# Patient Record
Sex: MALE | Race: WHITE | NOT HISPANIC OR LATINO | Employment: OTHER | URBAN - METROPOLITAN AREA
[De-identification: names, ages, dates, MRNs, and addresses within clinical notes are randomized per-mention and may not be internally consistent; named-entity substitution may affect disease eponyms.]

---

## 2017-08-22 ENCOUNTER — DOCUMENTATION ONLY (OUTPATIENT)
Dept: OTHER | Facility: CLINIC | Age: 77
End: 2017-08-22

## 2017-08-22 PROBLEM — Z71.89 ADVANCED DIRECTIVES, COUNSELING/DISCUSSION: Chronic | Status: ACTIVE | Noted: 2017-08-22

## 2018-06-08 ENCOUNTER — TRANSFERRED RECORDS (OUTPATIENT)
Dept: HEALTH INFORMATION MANAGEMENT | Facility: CLINIC | Age: 78
End: 2018-06-08

## 2018-06-12 ENCOUNTER — TRANSFERRED RECORDS (OUTPATIENT)
Dept: HEALTH INFORMATION MANAGEMENT | Facility: CLINIC | Age: 78
End: 2018-06-12

## 2018-06-12 LAB
CHOLEST SERPL-MCNC: 126 MG/DL (ref 114–200)
CREAT SERPL-MCNC: 1.03 MG/DL (ref 0.7–1.2)
GFR SERPL CREATININE-BSD FRML MDRD: >60 ML/MIN/1.73M2
GLUCOSE SERPL-MCNC: 114 MG/DL (ref 70–100)
HDLC SERPL-MCNC: 35 MG/DL (ref 40–60)
LDLC SERPL CALC-MCNC: 53 MG/DL
POTASSIUM SERPL-SCNC: 4.3 MEQ/L (ref 3.4–5.1)
TRIGL SERPL-MCNC: 201 MG/DL (ref 10–200)

## 2018-08-08 ENCOUNTER — TRANSFERRED RECORDS (OUTPATIENT)
Dept: HEALTH INFORMATION MANAGEMENT | Facility: CLINIC | Age: 78
End: 2018-08-08

## 2018-08-15 ENCOUNTER — TRANSFERRED RECORDS (OUTPATIENT)
Dept: HEALTH INFORMATION MANAGEMENT | Facility: CLINIC | Age: 78
End: 2018-08-15

## 2018-08-15 LAB
CREAT SERPL-MCNC: 1.07 MG/DL (ref 0.7–1.2)
GFR SERPL CREATININE-BSD FRML MDRD: >60 ML/MIN/1.73M2
POTASSIUM SERPL-SCNC: 4.4 MEQ/L (ref 3.4–5.1)

## 2018-08-20 ENCOUNTER — TRANSFERRED RECORDS (OUTPATIENT)
Dept: HEALTH INFORMATION MANAGEMENT | Facility: CLINIC | Age: 78
End: 2018-08-20

## 2018-08-20 LAB
ABSTRACT IFOB-NO CHARGE: NEGATIVE
ABSTRACT IFOB-NO CHARGE: NEGATIVE

## 2018-08-27 ENCOUNTER — TRANSFERRED RECORDS (OUTPATIENT)
Dept: HEALTH INFORMATION MANAGEMENT | Facility: CLINIC | Age: 78
End: 2018-08-27

## 2018-10-08 ENCOUNTER — TRANSFERRED RECORDS (OUTPATIENT)
Dept: HEALTH INFORMATION MANAGEMENT | Facility: CLINIC | Age: 78
End: 2018-10-08

## 2018-10-18 ENCOUNTER — TRANSFERRED RECORDS (OUTPATIENT)
Dept: HEALTH INFORMATION MANAGEMENT | Facility: CLINIC | Age: 78
End: 2018-10-18

## 2019-04-18 ENCOUNTER — TRANSFERRED RECORDS (OUTPATIENT)
Dept: HEALTH INFORMATION MANAGEMENT | Facility: CLINIC | Age: 79
End: 2019-04-18

## 2019-05-20 ENCOUNTER — TRANSFERRED RECORDS (OUTPATIENT)
Dept: HEALTH INFORMATION MANAGEMENT | Facility: CLINIC | Age: 79
End: 2019-05-20

## 2019-05-28 ENCOUNTER — TRANSFERRED RECORDS (OUTPATIENT)
Dept: HEALTH INFORMATION MANAGEMENT | Facility: CLINIC | Age: 79
End: 2019-05-28

## 2019-07-19 NOTE — PROGRESS NOTES
Subjective     Zoran Granado is a 79 year old male who presents to clinic today for the following health issues:    HPI   New Patient/Transfer of Care    Hyperlipidemia Follow-Up      Are you having any of the following symptoms? (Select all that apply)  Increased sweating or nausea with activity and Pain in calves when walking 1-2 blocks    Are you regularly taking any medication or supplement to lower your cholesterol?   Yes- Lipitor    Are you having muscle aches or other side effects that you think could be caused by your cholesterol lowering medication?  Yes- unsure if it is due to medication.         Amount of exercise or physical activity: 2-3 days/week for an average of 15-30 minutes    Problems taking medications regularly: No    Medication side effects: none    Diet: regular (no restrictions)     Chronic Pain Follow-Up       Type / Location of Pain: Left shoulder, neck, lower back   Analgesia/pain control:       Recent changes:  Same - stable       Overall control: Tolerable with discomfort  Activity level/function:      Daily activities:  Able to do moderate activities    Work:  not applicable - retired   Adverse effects:  No  Adherance    Taking medication as directed?  Yes    Participating in other treatments: not applicable  Risk Factors:    Sleep:  Fair    Mood/anxiety:  controlled    Recent family or social stressors:  none noted    Other aggravating factors: prolonged sitting and prolonged standing, walking     Pain diagnosis - DJD of lumbar spine, spondylolisthesis, DJD of cervical spine, sciatica, thoracic back pain.    Prior injections, rhizotomy, pain program completion in [facility].  Prior trigger point injections with PMR. Botox not covered.  Prior chiropractic.  Prior back surgery, shoulder surgery.  Prior Celebrex, Neurontin wasn't effective,  Elavil with side effects, as well as Effexor and Wellbutrin.  No history of mental health diagnoses.    Currently regimen of Lyrica and Cymbalta  working well with Lortab 3 times per day on average, occasionally 4.    Regimen allows him to do yard work, cut grass, garden, household projects.    Former smoker - quit .    No flowsheet data found.  No flowsheet data found.  Encounter-Level CSA:    There are no encounter-level csa.     Patient-Level CSA:    There are no patient-level csa.       (left shoulder surgery one year ago)        Current Outpatient Medications   Medication     Acetaminophen 325 MG CAPS     aspirin 81 MG tablet     atorvastatin (LIPITOR) 80 MG tablet     calcium carbonate-vitamin D 600-200 MG-UNIT CAPS     diphenhydrAMINE-acetaminophen (TYLENOL PM EXTRA STRENGTH)  MG tablet     DULoxetine (CYMBALTA) 60 MG capsule     HYDROcodone-acetaminophen (LORTAB 10)  MG per tablet     Multiple Vitamins-Minerals (PRESERVISION AREDS 2 PO)     polyethylene glycol (MIRALAX) powder     pregabalin (LYRICA) 150 MG capsule     No current facility-administered medications for this visit.        Patient Active Problem List   Diagnosis     Multiple lipomas     Advance Care Planning     Past Surgical History:   Procedure Laterality Date     APPENDECTOMY       ARTHROSCOPY KNEE BILATERAL       BACK SURGERY      lumbar L5/S1     CATARACT IOL, RT/LT Bilateral      COLONOSCOPY       disc replacement      lumbar; single level L5/S1; Dr Fauzia Renner [facility] ECP WITH CATARACT SURGERY      both eyes     NISSEN FUNDOPLICATION       SHOULDER SURGERY      Bilateral; twice left, once right; arthroscopies     TONSILLECTOMY         Social History     Tobacco Use     Smoking status: Former Smoker     Types: Cigarettes     Last attempt to quit: 1992     Years since quittin.5     Smokeless tobacco: Never Used   Substance Use Topics     Alcohol use: Never     Frequency: Never     Family History   Problem Relation Age of Onset     Diabetes Mother      Heart Disease Mother      Heart Disease Father      Lipids Sister         5 sisters  "    Lipids Brother         2 brothers     Diabetes Brother      Hypertension Brother      Breast Cancer Sister         breast             Reviewed and updated as needed this visit by Provider  Tobacco  Allergies  Meds  Problems  Med Hx  Surg Hx  Fam Hx  Soc Hx          Review of Systems   ROS COMP: Constitutional, HEENT, cardiovascular, pulmonary, gi and gu systems are negative, except as otherwise noted.      Objective    /70 (BP Location: Right arm, Patient Position: Chair, Cuff Size: Adult Large)   Pulse 78   Temp 97.8  F (36.6  C) (Tympanic)   Ht 1.772 m (5' 9.75\")   Wt 111 kg (244 lb 12.8 oz)   SpO2 93%   BMI 35.38 kg/m    Body mass index is 35.38 kg/m .  Physical Exam   GENERAL: healthy, alert and no distress  NECK: no adenopathy, no asymmetry, masses, or scars and thyroid normal to palpation  RESP: lungs clear to auscultation - no rales, rhonchi or wheezes  CV: regular rate and rhythm, normal S1 S2, no S3 or S4, no murmur, click or rub, no peripheral edema and peripheral pulses strong  ABDOMEN: soft, nontender, no hepatosplenomegaly, no masses and bowel sounds normal  MS: normal muscle tone, peripheral pulses normal and lumbar surgical scar well healed with minimal tenderness; SLR negative bilaterally; normal gait; reflexes symmetric  SKIN: no suspicious lesions or rashes  NEURO: Normal strength and tone, mentation intact and speech normal  PSYCH: mentation appears normal, affect normal/bright    Diagnostic Test Results:  Care Everywhere records reviewed.        Assessment & Plan       ICD-10-CM    1. Encounter for medical examination to establish care Z00.00    2. Chronic pain syndrome G89.4 Pain Drug Scr UR W Rptd Meds   3. Hyperlipidemia, unspecified hyperlipidemia type E78.5         BMI:   Estimated body mass index is 35.38 kg/m  as calculated from the following:    Height as of this encounter: 1.772 m (5' 9.75\").    Weight as of this encounter: 111 kg (244 lb 12.8 oz).   Weight " management plan: Discussed healthy diet and exercise guidelines    Will call when due for next refill - not due yet today.    Patient Instructions   Contract signed today.  Lab screen today.  Office visits every 3 months.          No follow-ups on file.    Evelyn Iniguez MD  [facility] [place]

## 2019-07-25 ENCOUNTER — DOCUMENTATION ONLY (OUTPATIENT)
Dept: OTHER | Facility: CLINIC | Age: 79
End: 2019-07-25

## 2019-07-25 ENCOUNTER — OFFICE VISIT (OUTPATIENT)
Dept: FAMILY MEDICINE | Facility: OTHER | Age: 79
End: 2019-07-25
Attending: FAMILY MEDICINE
Payer: COMMERCIAL

## 2019-07-25 VITALS
SYSTOLIC BLOOD PRESSURE: 140 MMHG | TEMPERATURE: 97.8 F | HEART RATE: 78 BPM | BODY MASS INDEX: 35.05 KG/M2 | OXYGEN SATURATION: 93 % | WEIGHT: 244.8 LBS | HEIGHT: 70 IN | DIASTOLIC BLOOD PRESSURE: 70 MMHG

## 2019-07-25 DIAGNOSIS — G89.4 CHRONIC PAIN SYNDROME: ICD-10-CM

## 2019-07-25 DIAGNOSIS — Z00.00 ENCOUNTER FOR MEDICAL EXAMINATION TO ESTABLISH CARE: Primary | ICD-10-CM

## 2019-07-25 DIAGNOSIS — E78.5 HYPERLIPIDEMIA, UNSPECIFIED HYPERLIPIDEMIA TYPE: ICD-10-CM

## 2019-07-25 PROCEDURE — 80307 DRUG TEST PRSMV CHEM ANLYZR: CPT | Mod: ZL | Performed by: FAMILY MEDICINE

## 2019-07-25 PROCEDURE — 99000 SPECIMEN HANDLING OFFICE-LAB: CPT | Performed by: FAMILY MEDICINE

## 2019-07-25 PROCEDURE — G0463 HOSPITAL OUTPT CLINIC VISIT: HCPCS

## 2019-07-25 PROCEDURE — 99203 OFFICE O/P NEW LOW 30 MIN: CPT | Performed by: FAMILY MEDICINE

## 2019-07-25 RX ORDER — ATORVASTATIN CALCIUM 80 MG/1
80 TABLET, FILM COATED ORAL DAILY
COMMUNITY
End: 2019-12-23

## 2019-07-25 SDOH — HEALTH STABILITY: MENTAL HEALTH: HOW OFTEN DO YOU HAVE A DRINK CONTAINING ALCOHOL?: NEVER

## 2019-07-25 ASSESSMENT — ANXIETY QUESTIONNAIRES
4. TROUBLE RELAXING: NOT AT ALL
GAD7 TOTAL SCORE: 0
3. WORRYING TOO MUCH ABOUT DIFFERENT THINGS: NOT AT ALL
5. BEING SO RESTLESS THAT IT IS HARD TO SIT STILL: NOT AT ALL
2. NOT BEING ABLE TO STOP OR CONTROL WORRYING: NOT AT ALL
7. FEELING AFRAID AS IF SOMETHING AWFUL MIGHT HAPPEN: NOT AT ALL
6. BECOMING EASILY ANNOYED OR IRRITABLE: NOT AT ALL
1. FEELING NERVOUS, ANXIOUS, OR ON EDGE: NOT AT ALL

## 2019-07-25 ASSESSMENT — PATIENT HEALTH QUESTIONNAIRE - PHQ9: SUM OF ALL RESPONSES TO PHQ QUESTIONS 1-9: 1

## 2019-07-25 ASSESSMENT — PAIN SCALES - GENERAL: PAINLEVEL: MODERATE PAIN (5)

## 2019-07-25 ASSESSMENT — MIFFLIN-ST. JEOR: SCORE: 1837.69

## 2019-07-25 NOTE — LETTER
[facility]  07/25/19    Patient: Zoran Granado  YOB: 1940  Medical Record Number: [id number]                                                                  Opioid / Opioid Plus Controlled Substance Agreement    I understand that my care provider has prescribed an opioid (narcotic) controlled substance to help manage my condition(s). I am taking this medicine to help me function or work. I know this is strong medicine, and that it can cause serious side effects. Opioid medicine can be sedating, addicting and may cause a dependency on the drug. They can affect my ability to drive or think, and cause depression. They need to be taken exactly as prescribed. Combining opioids with certain medicines or chemicals (such as cocaine, sedatives and tranquilizers, sleeping pills, meth) can be dangerous or even fatal. Also, if I stop opioids suddenly, I may have severe withdrawal symptoms. Last, I understand that opioids do not work for all types of pain nor for all patients. If not helpful, I may be asked to stop them.        The risks, benefits, and side effects of these medicine(s) were explained to me. I agree that:    1. I will take part in other treatments as advised by my care team. This may be psychiatry or counseling, physical therapy, behavioral therapy, group treatment or a referral to a pain clinic. I will reduce or stop my medicine when my care team tells me to do so.  2. I will take my medicines as prescribed. I will not change the dose or schedule unless my care team tells me to. There will be no refills if I  run out early.   I may be contactedwithout warning and asked to complete a urine drug test or pill count at any time.   3. I will keep all my appointments, and understand this is part of the monitoring of opioids. My care team may require an office visit for EVERY opioid/controlled substance refill. If I miss appointments or don t follow instructions, my care team may stop my  medicine.  4. I will not ask other providers to prescribe controlled substances, and I will not accept controlled substances from other people. If I need another prescribed controlled substance for a new reason, I will tell my care team within 1 business day.  5. I will use one pharmacy to fill all of my controlled substance prescriptions, and it is up to me to make sure that I do not run out of my medicines on weekends or holidays. If my care team is willing to refill my opioid prescription without a visit, I must request refills only during office hours, refills may take up to 3 days to process, and it may take up to 5 to 7 days for my medicine to be mailed and ready at my pharmacy. Prescriptions will not be mailed anywhere except my pharmacy.        [id number]  Rev 12/18         Registration to scan to EHR                             Page 1 of 2               Controlled Substance Agreement Opioid        [facility] [place] [facility]  07/25/19  Patient: Zoran Granado  YOB: 1940  Medical Record Number: [id number]                                                                  6. I am responsible for my prescriptions. If the medicine/prescription is lost or stolen, it will not be replaced. I also agree not to share controlled substance medicines with anyone.  7. I agree to not use ANY illegal or recreational drugs. This includes marijuana, cocaine, bath salts or other drugs. I agree not to use alcohol unless my care team says I may.          I agree to give urine samples whenever asked. If I don t give a urine sample, the care team may stop my medicine.    8. If I enroll in the [place] Medical Marijuana program, I will tell my care team. I will also sign an agreement to share my medical records with my care team.   9. I will bring in my list of medicines (or my medicine bottles) each time I come to the clinic.   10. I will tell my care team right away if I become pregnant or have a new medical problem treated  outside of my regular clinic.  11. I understand that this medicine can affect my thinking and judgment. It may be unsafe for me to drive, use machinery and do dangerous tasks. I will not do any of these things until I know how the medicine affects me. If my dose changes, I will wait to see how it affects me. I will contact my care team if I have concerns about medicine side effects.    I understand that if I do not follow any of the conditions above, my prescriptions or treatment may be stopped.      I agree that my provider, clinic care team, and pharmacy may work with any city, state or federal law enforcement agency that investigates the misuse, sale, or other diversion of my controlled medicine. I will allow my provider to discuss my care with or share a copy of this agreement with any other treating provider, pharmacy or emergency room where I receive care. I agree to give up (waive) any right of privacy or confidentiality with respect to these consents.     I have read this agreement and have asked questions about anything I did not understand.      ________________________________________________________________________  Patient signature - Date/Time -  Zoran Granado                                      ________________________________________________________________________  Witness signature                                                            ________________________________________________________________________  Provider signature - Evelyn Iniguez MD      [id number]  Rev 12/18         Registration to scan to EHR                         Page 2 of 2                   Controlled Substance Agreement Opioid           Page 1 of 2  Opioid Pain Medicines (also known as Narcotics)  What You Need to Know    What are opioids?   Opioids are pain medicines that must be prescribed by a doctor.  They are also known as narcotics.    Examples are:     morphine (MS Contin, Kristina)    oxycodone  (Oxycontin)    oxycodone and acetaminophen (Percocet)    hydrocodone and acetaminophen (Vicodin, Norco)     fentanyl patch (Duragesic)     hydromorphone (Dilaudid)     methadone     What do opioids do well?   Opioids are best for short-term pain after a surgery or injury. They also work well for cancer pain. Unlike other pain medicines, they do not cause liver or kidney failure or ulcers. They may help some people with long-lasting (chronic) pain.     What do opioids NOT do well?   Opioids never get rid of pain entirely, and they do not work well for most patients with chronic pain. Opioids do not reduce swelling, one of the causes of pain. They also don t work well for nerve pain.                           For informational purposes only.  Not to replace the advice of your care provider.  Copyright [address] [facility]. All right reserved. [employer] 968780-Mcx 02/18.      Page 2 of 2    Risks and side effects   Talk to your doctor before you start or decide to keep taking one of these medicines. Side effects include:    Lowering your breathing rate enough to cause death    Overdose, including death, especially if taking higher than prescribed doses    Long-term opioid use    Worse depression symptoms; less pleasure in things you usually enjoy    Feeling tired or sluggish    Slower thoughts or cloudy thinking    Being more sensitive to pain over time; pain is harder to control    Trouble sleeping or restless sleep    Changes in hormone levels (for example, less testosterone)    Changes in sex drive or ability to have sex    Constipation    Unsafe driving    Itching and sweating    Feeling dizzy    Nausea, vomiting and dry mouth    What else should I know about opioids?  When someone takes opioids for too long or too often, they become dependent. This means that if you stop or reduce the medicine too quickly, you will have withdrawal symptoms.    Dependence is not the same as addiction. Addiction is when  people keep using a substance that harms their body, their mind or their relations with others. If you have a history of drug or alcohol abuse, taking opioids can cause a relapse.    Over time, opioids don t work as well. Most people will need higher and higher doses. The higher the dose, the more serious the side effects. We don t know the long-term effects of opioids.      Prescribed opioids aren't the best way to manage chronic pain    Other ways to manage pain include:      Ibuprofen or acetaminophen.  You should always try this first.      Treat health problems that may be causing pain.      acupuncture or massage, deep breathing, meditation, visual imagery, aromatherapy.      Use heat or ice at the pain site      Physical therapy and exercise      Stop smoking      See a counselor or therapist                                                  People who have used opioids for a long time may have a lower quality of life, worse depression, higher levels of pain and more visits to doctors.    Never share your opioids with others. Be sure to store opioids in a secure place, locked if possible.Young children can easily swallow them and overdose.     You can overdose on opioids.  Signs of overdose include decrease or loss of consciousness, slowed breathing, trouble waking and blue lips.  If someone is worried about overdose, they should call 911.    If you are at risk for overdose, you may get naloxone (Narcan, a medicine that reverses the effects of opioids.  If you overdose, a friend or family member can give you Narcan while waiting for the ambulance.  They need to know the signs of overdose and how to give Narcan.    While you're taking opioids:    Don't use alcohol or street drugs. Taking them together can cause death.    Don't take any of these medicines unless your doctor says its okay.  Taking these with opioids can cause death.    Benzodiazepines (such as lorazepam         or diazepam)    Muscle relaxers  (such as cyclobenzaprine)    sleeping pills    other opioids    Safe disposal of opioids  Find your area drug take-back program, your pharmacy mail-back program, buy a special disposal bag (such as Deterra) from your pharmacy or flush them down the toilet.  Use the guidelines at:  www.fda.gov/drugs/resourcesforyou

## 2019-07-25 NOTE — NURSING NOTE
"Chief Complaint   Patient presents with     Establish Care       Initial /68 (BP Location: Right arm, Patient Position: Chair, Cuff Size: Adult Large)   Pulse 78   Temp 97.8  F (36.6  C) (Tympanic)   Ht 1.772 m (5' 9.75\")   Wt 111 kg (244 lb 12.8 oz)   SpO2 93%   BMI 35.38 kg/m   Estimated body mass index is 35.38 kg/m  as calculated from the following:    Height as of this encounter: 1.772 m (5' 9.75\").    Weight as of this encounter: 111 kg (244 lb 12.8 oz).  Medication Reconciliation: complete     Catherine Olvera LPN      "

## 2019-07-26 ASSESSMENT — ANXIETY QUESTIONNAIRES: GAD7 TOTAL SCORE: 0

## 2019-07-31 LAB — PAIN DRUG SCR UR W RPTD MEDS: NORMAL

## 2019-09-10 DIAGNOSIS — G89.4 CHRONIC PAIN SYNDROME: Primary | ICD-10-CM

## 2019-09-10 NOTE — TELEPHONE ENCOUNTER
Hydrocodone-acetaminophen 10-500mg      Last Written Prescription Date:  PT REPORTED  Last Fill Quantity: 0,   # refills: 0  Last Office Visit: 7/25/2019  Future Office visit:    Next 5 appointments (look out 90 days)    Oct 25, 2019  9:15 AM CDT  (Arrive by 9:00 AM)  [facility] Evelyn Olson [facility]  [facility] [address]  [place] [address]  [phone]           Routing refill request to provider for review/approval because:  Drug not on the [facility], [facility] or [facility] refill protocol or controlled substance  Patient showed up to clinic asking for prescription. Patient is leaving on a trip and is hoping to  prescription today. Please verify dose, and amount of tablets.

## 2019-09-11 RX ORDER — HYDROCODONE BITARTRATE AND ACETAMINOPHEN 10; 325 MG/1; MG/1
1 TABLET ORAL EVERY 6 HOURS PRN
Qty: 95 TABLET | Refills: 0 | Status: SHIPPED | OUTPATIENT
Start: 2019-09-11 | End: 2019-10-11

## 2019-09-30 DIAGNOSIS — G89.29 CHRONIC LEFT-SIDED LOW BACK PAIN WITH LEFT-SIDED SCIATICA: Primary | ICD-10-CM

## 2019-09-30 DIAGNOSIS — M54.42 CHRONIC LEFT-SIDED LOW BACK PAIN WITH LEFT-SIDED SCIATICA: Primary | ICD-10-CM

## 2019-09-30 NOTE — TELEPHONE ENCOUNTER
LYRICA      Last Written Prescription Date:  HISTORICAL MEDICATION    Last Office Visit: 7/25/19  Future Office visit:    Next 5 appointments (look out 90 days)    Oct 25, 2019  9:15 AM CDT  (Arrive by 9:00 AM)  SHORT with Evelyn Olson MD  [facility] ([facility] ) [address]  [place] [address]  [phone]           Routing refill request to provider for review/approval because

## 2019-10-02 RX ORDER — PREGABALIN 150 MG/1
150 CAPSULE ORAL 3 TIMES DAILY
Qty: 270 CAPSULE | Refills: 0 | Status: SHIPPED | OUTPATIENT
Start: 2019-10-02 | End: 2019-12-23

## 2019-10-11 DIAGNOSIS — G89.4 CHRONIC PAIN SYNDROME: ICD-10-CM

## 2019-10-11 RX ORDER — HYDROCODONE BITARTRATE AND ACETAMINOPHEN 10; 325 MG/1; MG/1
1 TABLET ORAL EVERY 6 HOURS PRN
Qty: 95 TABLET | Refills: 0 | Status: SHIPPED | OUTPATIENT
Start: 2019-10-11 | End: 2019-10-25

## 2019-10-11 NOTE — TELEPHONE ENCOUNTER
Hydrocodone      Last Written Prescription Date:  7/25/19  Last Fill Quantity: 95,   # refills: 0  Last Office Visit: 9/11/19  Future Office visit:    Next 5 appointments (look out 90 days)    Oct 25, 2019  9:15 AM CDT  (Arrive by 9:00 AM)  [facility]  [facility] [address]  [place] [address]  [phone]           Routing refill request to provider for review/approval because:

## 2019-10-14 NOTE — TELEPHONE ENCOUNTER
Patient called and advised hard copy script is available for  at the  for Norco  mg Kirti Johnson RN on 10/14/2019 at 11:18 AM

## 2019-10-21 NOTE — PROGRESS NOTES
Subjective     Zoran Granado is a 79 year old male who presents to clinic today for the following health issues:    Had established care 7/2019.    HPI   Chronic Pain Follow-Up       Type / Location of Pain: lower back and left shoulder   Analgesia/pain control:       Recent changes:  worse      Overall control: Inadequate pain control  Activity level/function:      Daily activities:  Can do most things most days, with some rest    Work:  not applicable  Adverse effects:  No  Adherance    Taking medication as directed?  Yes    Participating in other treatments: not applicable  Risk Factors:    Sleep:  Poor    Mood/anxiety:  controlled    Recent family or social stressors:  none noted    Other aggravating factors: any movement      Pain diagnosis - DJD of lumbar spine, spondylolisthesis, DJD of cervical spine, sciatica, thoracic back pain.     Prior injections, rhizotomy, pain program completion in [facility].  Prior trigger point injections with PMR. Botox not covered.  Prior chiropractic.  Prior back surgery, shoulder surgery.  Prior Celebrex, Neurontin wasn't effective,  Elavil with side effects, as well as Effexor and Wellbutrin.  No history of mental health diagnoses.     Currently regimen of Lyrica and Cymbalta working well with Lortab 3 times per day on average, occasionally 4.     Regimen allows him to do yard work, cut grass, garden, household projects.    Last refill 10/11/19.  Mowed lawn yesterday.  Depends on activities.  Typically takes TID Lortab 6am, 12, 6pm.  If takes it too late - feels revved up.    Below right ear - crusted, red area.  Putting salve on it for a couple weeks.      Would like to do labs as he is fasting.    Is due for follow up hyperlipidemia, prediabetes.     PHQ-9 SCORE 7/25/2019   PHQ-9 Total Score 1     ROBYN-7 SCORE 7/25/2019   Total Score 0     Encounter-Level CSA:    There are no encounter-level csa.     Patient-Level CSA:    Controlled Substance Agreement - Opioid - Scan on  7/26/2019 12:01 PM: OPIOD/OPIOD PLUS CONTROLLED SUBSTANCE AGREEMENT           How many servings of fruits and vegetables do you eat daily?  2-3    On average, how many sweetened beverages do you drink each day (soda, juice, sweet tea, etc)?   0    How many days per week do you miss taking your medication? 0            Current Outpatient Medications   Medication     Acetaminophen 325 MG CAPS     aspirin 81 MG tablet     atorvastatin (LIPITOR) 80 MG tablet     calcium carbonate-vitamin D 600-200 MG-UNIT CAPS     diphenhydrAMINE-acetaminophen (TYLENOL PM EXTRA STRENGTH)  MG tablet     DULoxetine (CYMBALTA) 60 MG capsule     HYDROcodone-acetaminophen (NORCO)  MG per tablet     Multiple Vitamins-Minerals (PRESERVISION AREDS 2 PO)     polyethylene glycol (MIRALAX) powder     pregabalin (LYRICA) 150 MG capsule     No current facility-administered medications for this visit.        Patient Active Problem List   Diagnosis     Multiple lipomas     Advance Care Planning     Chronic pain syndrome     Hyperlipidemia, unspecified hyperlipidemia type     Chronic, continuous use of opioids     Adjustment disorder with depressed mood     Cervicalgia     Acute pain of left shoulder     Class 1 obesity without serious comorbidity with body mass index (BMI) of 33.0 to 33.9 in adult, unspecified obesity type     Congenital spondylolisthesis     Degeneration of cervical intervertebral disc     Degeneration of lumbar or lumbosacral intervertebral disc     Elevated fasting glucose     Esophageal reflux     Impingement syndrome of left shoulder     Long term (current) use of opiate analgesic     Lumbago     Muscle weakness     Shoulder stiffness, left     Thoracic and lumbosacral neuritis     Sciatica     Hyperlipidemia     HCD (health care directive)     Obesity (BMI 35.0-39.9) with comorbidity (H)     Past Surgical History:   Procedure Laterality Date     APPENDECTOMY       ARTHROSCOPY KNEE BILATERAL       BACK SURGERY  2000     lumbar L5/S1     CATARACT IOL, RT/LT Bilateral      COLONOSCOPY       COLONOSCOPY - HIM SCAN  2004    Colonoscopy & Polypectomy - a small polyp.  Path - benign colonic mucosa     disc replacement      lumbar; single level L5/S1; Dr Fauzia Renner [facility] ECP WITH CATARACT SURGERY  2012    both eyes     NISSEN FUNDOPLICATION       SHOULDER SURGERY      Bilateral; twice left, once right; arthroscopies     TONSILLECTOMY         Social History     Tobacco Use     Smoking status: Former Smoker     Types: Cigarettes     Last attempt to quit: 1992     Years since quittin.8     Smokeless tobacco: Never Used   Substance Use Topics     Alcohol use: Never     Frequency: Never     Family History   Problem Relation Age of Onset     Diabetes Mother      Heart Disease Mother      Heart Disease Father      Lipids Sister         5 sisters     Lipids Brother         2 brothers     Diabetes Brother      Hypertension Brother      Breast Cancer Sister         breast           Hyperlipidemia Follow-Up      Are you having any of the following symptoms? (Select all that apply)  No complaints of shortness of breath, chest pain or pressure.  No increased sweating or nausea with activity.  No left-sided neck or arm pain.  No complaints of pain in calves when walking 1-2 blocks.    Are you regularly taking any medication or supplement to lower your cholesterol?   Yes- statin    Are you having muscle aches or other side effects that you think could be caused by your cholesterol lowering medication?  No      Reviewed and updated as needed this visit by Provider         Review of Systems   ROS COMP: Constitutional, HEENT, cardiovascular, pulmonary, gi and gu systems are negative, except as otherwise noted.      Objective    BP (!) 140/78 (BP Location: Right arm, Patient Position: Chair, Cuff Size: Adult Large)   Pulse 74   Temp 97.2  F (36.2  C) (Tympanic)   Wt 111.4 kg (245 lb 9.6 oz)   SpO2 93%   BMI 35.49  "kg/m    Body mass index is 35.49 kg/m .  Physical Exam   GENERAL: alert, no distress and over weight  RESP: lungs clear to auscultation - no rales, rhonchi or wheezes  CV: regular rate and rhythm, normal S1 S2, no S3 or S4, no murmur, click or rub, no peripheral edema and peripheral pulses strong  MS: normal muscle tone and tenderness to palpation lumbar spine, paraspinal; forward flexion to 90 degrees; discomfort with back extension; toe/heel raise normal; normal AROM shoulders  SKIN: crusting, scab behind left ear 1cm  PSYCH: mentation appears normal, affect normal/bright    Diagnostic Test Results:  Labs reviewed in Epic        Assessment & Plan       ICD-10-CM    1. Chronic pain syndrome G89.4 CBC with platelets and differential     HYDROcodone-acetaminophen (NORCO)  MG per tablet   2. Chronic, continuous use of opioids F11.90 CBC with platelets and differential   3. Need for prophylactic vaccination and inoculation against influenza Z23 INFLUENZA (HIGH DOSE) 3 VALENT VACCINE [15030]     Vaccine Administration, Initial [26731]   4. Hyperlipidemia, unspecified hyperlipidemia type E78.5 Lipid Profile (Chol, Trig, HDL, LDL calc)     Comprehensive metabolic panel   5. Elevated fasting glucose R73.01 Hemoglobin A1c     Comprehensive metabolic panel   6. Screening for prostate cancer Z12.5 PSA, screen   7. Morbid obesity (H) E66.01 CBC with platelets and differential   8. AK (actinic keratosis) L57.0         BMI:   Estimated body mass index is 35.49 kg/m  as calculated from the following:    Height as of 7/25/19: 1.772 m (5' 9.75\").    Weight as of this encounter: 111.4 kg (245 lb 9.6 oz).   Weight management plan: Discussed healthy diet and exercise guidelines      They will continue salve on skin behind ear.  If ongoing, will refer for excisional biopsy.    Patient Instructions   Will call with lab results.  Flu shot given.  Increase Lortab to 4 per day: 6am, 10am, 2pm, 6pm.  Consider increasing Lyrica to 200 " mg 3 times daily.  Consider changing narcotics from Hydrocodone to Oxycodone.  Follow up 3 months.      Return in about 3 months (around 1/25/2020) for chronic pain.    Evelyn Iniguez MD  [facility] [place]

## 2019-10-25 ENCOUNTER — OFFICE VISIT (OUTPATIENT)
Dept: FAMILY MEDICINE | Facility: OTHER | Age: 79
End: 2019-10-25
Attending: FAMILY MEDICINE
Payer: COMMERCIAL

## 2019-10-25 VITALS
TEMPERATURE: 97.2 F | DIASTOLIC BLOOD PRESSURE: 78 MMHG | OXYGEN SATURATION: 93 % | SYSTOLIC BLOOD PRESSURE: 140 MMHG | HEART RATE: 74 BPM | WEIGHT: 245.6 LBS | BODY MASS INDEX: 35.49 KG/M2

## 2019-10-25 DIAGNOSIS — Z12.5 SCREENING FOR PROSTATE CANCER: ICD-10-CM

## 2019-10-25 DIAGNOSIS — R73.01 ELEVATED FASTING GLUCOSE: ICD-10-CM

## 2019-10-25 DIAGNOSIS — L57.0 AK (ACTINIC KERATOSIS): ICD-10-CM

## 2019-10-25 DIAGNOSIS — F11.90 CHRONIC, CONTINUOUS USE OF OPIOIDS: Chronic | ICD-10-CM

## 2019-10-25 DIAGNOSIS — G89.4 CHRONIC PAIN SYNDROME: Primary | ICD-10-CM

## 2019-10-25 DIAGNOSIS — E78.5 HYPERLIPIDEMIA, UNSPECIFIED HYPERLIPIDEMIA TYPE: ICD-10-CM

## 2019-10-25 DIAGNOSIS — Z23 NEED FOR PROPHYLACTIC VACCINATION AND INOCULATION AGAINST INFLUENZA: ICD-10-CM

## 2019-10-25 DIAGNOSIS — E66.01 MORBID OBESITY (H): ICD-10-CM

## 2019-10-25 PROBLEM — M75.42 IMPINGEMENT SYNDROME OF LEFT SHOULDER: Status: ACTIVE | Noted: 2018-08-03

## 2019-10-25 PROBLEM — M25.512 ACUTE PAIN OF LEFT SHOULDER: Status: ACTIVE | Noted: 2018-10-04

## 2019-10-25 PROBLEM — Z79.891 LONG TERM (CURRENT) USE OF OPIATE ANALGESIC: Status: ACTIVE | Noted: 2018-08-09

## 2019-10-25 PROBLEM — M25.612 SHOULDER STIFFNESS, LEFT: Status: ACTIVE | Noted: 2018-10-04

## 2019-10-25 PROBLEM — M62.81 MUSCLE WEAKNESS: Status: ACTIVE | Noted: 2018-10-04

## 2019-10-25 PROBLEM — E66.811 CLASS 1 OBESITY WITHOUT SERIOUS COMORBIDITY WITH BODY MASS INDEX (BMI) OF 33.0 TO 33.9 IN ADULT, UNSPECIFIED OBESITY TYPE: Status: ACTIVE | Noted: 2018-06-12

## 2019-10-25 LAB
ALBUMIN SERPL-MCNC: 3.8 G/DL (ref 3.4–5)
ALP SERPL-CCNC: 76 U/L (ref 40–150)
ALT SERPL W P-5'-P-CCNC: 26 U/L (ref 0–70)
ANION GAP SERPL CALCULATED.3IONS-SCNC: 4 MMOL/L (ref 3–14)
AST SERPL W P-5'-P-CCNC: 24 U/L (ref 0–45)
BASOPHILS # BLD AUTO: 0.1 10E9/L (ref 0–0.2)
BASOPHILS NFR BLD AUTO: 1.3 %
BILIRUB SERPL-MCNC: 0.5 MG/DL (ref 0.2–1.3)
BUN SERPL-MCNC: 19 MG/DL (ref 7–30)
CALCIUM SERPL-MCNC: 9.5 MG/DL (ref 8.5–10.1)
CHLORIDE SERPL-SCNC: 105 MMOL/L (ref 94–109)
CHOLEST SERPL-MCNC: 148 MG/DL
CO2 SERPL-SCNC: 30 MMOL/L (ref 20–32)
CREAT SERPL-MCNC: 1.09 MG/DL (ref 0.66–1.25)
DIFFERENTIAL METHOD BLD: NORMAL
EOSINOPHIL # BLD AUTO: 0.2 10E9/L (ref 0–0.7)
EOSINOPHIL NFR BLD AUTO: 3.6 %
ERYTHROCYTE [DISTWIDTH] IN BLOOD BY AUTOMATED COUNT: 13.4 % (ref 10–15)
EST. AVERAGE GLUCOSE BLD GHB EST-MCNC: 126 MG/DL
GFR SERPL CREATININE-BSD FRML MDRD: 65 ML/MIN/{1.73_M2}
GLUCOSE SERPL-MCNC: 115 MG/DL (ref 70–99)
HBA1C MFR BLD: 6 % (ref 0–5.6)
HCT VFR BLD AUTO: 46.2 % (ref 40–53)
HDLC SERPL-MCNC: 35 MG/DL
HGB BLD-MCNC: 15.4 G/DL (ref 13.3–17.7)
IMM GRANULOCYTES # BLD: 0 10E9/L (ref 0–0.4)
IMM GRANULOCYTES NFR BLD: 0.3 %
LDLC SERPL CALC-MCNC: 54 MG/DL
LYMPHOCYTES # BLD AUTO: 2 10E9/L (ref 0.8–5.3)
LYMPHOCYTES NFR BLD AUTO: 32.5 %
MCH RBC QN AUTO: 29.6 PG (ref 26.5–33)
MCHC RBC AUTO-ENTMCNC: 33.3 G/DL (ref 31.5–36.5)
MCV RBC AUTO: 89 FL (ref 78–100)
MONOCYTES # BLD AUTO: 0.7 10E9/L (ref 0–1.3)
MONOCYTES NFR BLD AUTO: 11.7 %
NEUTROPHILS # BLD AUTO: 3.1 10E9/L (ref 1.6–8.3)
NEUTROPHILS NFR BLD AUTO: 50.6 %
NONHDLC SERPL-MCNC: 113 MG/DL
NRBC # BLD AUTO: 0 10*3/UL
NRBC BLD AUTO-RTO: 0 /100
PLATELET # BLD AUTO: 224 10E9/L (ref 150–450)
POTASSIUM SERPL-SCNC: 4.8 MMOL/L (ref 3.4–5.3)
PROT SERPL-MCNC: 7.5 G/DL (ref 6.8–8.8)
PSA SERPL-ACNC: 2.19 UG/L (ref 0–4)
RBC # BLD AUTO: 5.21 10E12/L (ref 4.4–5.9)
SODIUM SERPL-SCNC: 139 MMOL/L (ref 133–144)
TRIGL SERPL-MCNC: 297 MG/DL
WBC # BLD AUTO: 6.1 10E9/L (ref 4–11)

## 2019-10-25 PROCEDURE — 80053 COMPREHEN METABOLIC PANEL: CPT | Mod: ZL | Performed by: FAMILY MEDICINE

## 2019-10-25 PROCEDURE — G0463 HOSPITAL OUTPT CLINIC VISIT: HCPCS | Mod: 25

## 2019-10-25 PROCEDURE — G0103 PSA SCREENING: HCPCS | Mod: ZL | Performed by: FAMILY MEDICINE

## 2019-10-25 PROCEDURE — 99214 OFFICE O/P EST MOD 30 MIN: CPT | Performed by: FAMILY MEDICINE

## 2019-10-25 PROCEDURE — 83036 HEMOGLOBIN GLYCOSYLATED A1C: CPT | Mod: ZL | Performed by: FAMILY MEDICINE

## 2019-10-25 PROCEDURE — 90471 IMMUNIZATION ADMIN: CPT | Performed by: FAMILY MEDICINE

## 2019-10-25 PROCEDURE — 40000788 ZZHCL STATISTIC ESTIMATED AVERAGE GLUCOSE: Mod: ZL | Performed by: FAMILY MEDICINE

## 2019-10-25 PROCEDURE — 80061 LIPID PANEL: CPT | Mod: ZL | Performed by: FAMILY MEDICINE

## 2019-10-25 PROCEDURE — 36415 COLL VENOUS BLD VENIPUNCTURE: CPT | Mod: ZL | Performed by: FAMILY MEDICINE

## 2019-10-25 PROCEDURE — 85025 COMPLETE CBC W/AUTO DIFF WBC: CPT | Mod: ZL | Performed by: FAMILY MEDICINE

## 2019-10-25 PROCEDURE — 90662 IIV NO PRSV INCREASED AG IM: CPT

## 2019-10-25 RX ORDER — HYDROCODONE BITARTRATE AND ACETAMINOPHEN 10; 325 MG/1; MG/1
1 TABLET ORAL EVERY 6 HOURS PRN
Qty: 120 TABLET | Refills: 0 | Status: SHIPPED | OUTPATIENT
Start: 2019-10-25 | End: 2019-12-02

## 2019-10-25 ASSESSMENT — PAIN SCALES - GENERAL: PAINLEVEL: SEVERE PAIN (6)

## 2019-10-25 NOTE — NURSING NOTE
"Chief Complaint   Patient presents with     Musculoskeletal Problem       Initial BP (!) 146/86 (BP Location: Right arm, Patient Position: Chair, Cuff Size: Adult Large)   Pulse 74   Temp 97.2  F (36.2  C) (Tympanic)   Wt 111.4 kg (245 lb 9.6 oz)   SpO2 93%   BMI 35.49 kg/m   Estimated body mass index is 35.49 kg/m  as calculated from the following:    Height as of 7/25/19: 1.772 m (5' 9.75\").    Weight as of this encounter: 111.4 kg (245 lb 9.6 oz).  Medication Reconciliation: complete  Kirby Wayne LPN  "

## 2019-10-25 NOTE — PATIENT INSTRUCTIONS
Will call with lab results.  Flu shot given.  Increase Lortab to 4 per day: 6am, 10am, 2pm, 6pm.  Consider increasing Lyrica to 200 mg 3 times daily.  Consider changing narcotics from Hydrocodone to Oxycodone.  Follow up 3 months.

## 2019-12-02 DIAGNOSIS — G89.4 CHRONIC PAIN SYNDROME: ICD-10-CM

## 2019-12-02 RX ORDER — HYDROCODONE BITARTRATE AND ACETAMINOPHEN 10; 325 MG/1; MG/1
1 TABLET ORAL EVERY 6 HOURS PRN
Qty: 120 TABLET | Refills: 0 | Status: SHIPPED | OUTPATIENT
Start: 2019-12-02 | End: 2020-01-10

## 2019-12-02 NOTE — TELEPHONE ENCOUNTER
Controlled Substance Refill Request for Hydrocodone   Problem List Complete:    Yes    Last Written Prescription Date:  10/25/2019  Last Fill Quantity: 120,   # refills: 0    THE MOST RECENT OFFICE VISIT MUST BE WITHIN THE PAST 3 MONTHS. AT LEAST ONE FACE TO FACE VISIT MUST OCCUR EVERY 6 MONTHS. ADDITIONAL VISITS CAN BE VIRTUAL.  (THIS STATEMENT SHOULD BE DELETED.)    Last Office Visit with [facility] primary care provider: 10/25/2019    Future Office visit:   Next 5 appointments (look out 90 days)    Jan 24, 2020  8:15 AM CST  (Arrive by 8:00 AM)  SHORT with Evelyn Olson MD  [facility] [place] ([facility] [place] ) [address]  [place] [address]  [phone]          Controlled substance agreement:   Encounter-Level CSA:    There are no encounter-level csa.     Patient-Level CSA:    Controlled Substance Agreement - Opioid - Scan on 7/26/2019 12:01 PM: OPIOD/OPIOD PLUS CONTROLLED SUBSTANCE AGREEMENT         Last Urine Drug Screen:   Pain Drug SCR UR W RPTD Meds   Date Value Ref Range Status   07/25/2019 FINAL  Final     Comment:     (Note)  ====================================================================  [facility] COMP DRUG ANALYSIS,UR  ====================================================================  Test                             Result       Flag       Units        Drug Present and Declared for Prescription Verification   Hydrocodone                    1443         EXPECTED   ng/mg creat   Hydromorphone                  48           EXPECTED   ng/mg creat   Dihydrocodeine                 118          EXPECTED   ng/mg creat   Norhydrocodone                 1209         EXPECTED   ng/mg creat    Sources of hydrocodone include scheduled prescription    medications. Hydromorphone, dihydrocodeine and norhydrocodone are    expected metabolites of hydrocodone. Hydromorphone and    dihydrocodeine are also available as scheduled prescription    medications.   Pregabalin                      PRESENT      EXPECTED                 Duloxetine                     PRESENT      EXPECTED                 Acetaminophen                  PRESENT      EXPECTED                Drug Present not Declared for Prescription Verification   Diphenhydramine                PRESENT      UNEXPECTED               Lidocaine                      PRESENT      UNEXPECTED              ====================================================================  Test                      Result    Flag   Units      Ref Range        Creatinine              141              mg/dL      >=20            ====================================================================  Declared Medications:  The flagging and interpretation on this report are based on the  following declared medications.  Unexpected results may arise from  inaccuracies in the declared medications.  **Note: The testing scope of this panel includes these medications:  Duloxetine (Cymbalta)  Hydrocodone (Lortab)  Pregabalin (Lyrica)  **Note: The testing scope of this panel does not include small to  moderate amounts of these reported medications:  Acetaminophen (Lortab)  ====================================================================  For clinical consultation, please call [phone].  ====================================================================  Analysis performed by [facility] [employer] [place] [address]     , No results found for: COMDAT, No results found for: THC13, PCP13, COC13, MAMP13, OPI13, AMP13, BZO13, TCA13, MTD13, BAR13, OXY13, PPX13, BUP13     Processing:  Rx to be electronically transmitted to pharmacy by provider

## 2019-12-23 DIAGNOSIS — E78.5 HYPERLIPIDEMIA, UNSPECIFIED HYPERLIPIDEMIA TYPE: Primary | ICD-10-CM

## 2019-12-23 DIAGNOSIS — M54.42 CHRONIC LEFT-SIDED LOW BACK PAIN WITH LEFT-SIDED SCIATICA: ICD-10-CM

## 2019-12-23 DIAGNOSIS — G89.29 CHRONIC LEFT-SIDED LOW BACK PAIN WITH LEFT-SIDED SCIATICA: ICD-10-CM

## 2019-12-23 RX ORDER — ATORVASTATIN CALCIUM 80 MG/1
80 TABLET, FILM COATED ORAL DAILY
Qty: 90 TABLET | Refills: 0 | Status: SHIPPED | OUTPATIENT
Start: 2019-12-23 | End: 2020-05-18

## 2019-12-23 RX ORDER — DULOXETIN HYDROCHLORIDE 60 MG/1
60 CAPSULE, DELAYED RELEASE ORAL 2 TIMES DAILY
Qty: 180 CAPSULE | Refills: 0 | Status: SHIPPED | OUTPATIENT
Start: 2019-12-23 | End: 2020-03-25

## 2019-12-23 RX ORDER — PREGABALIN 150 MG/1
150 CAPSULE ORAL 3 TIMES DAILY
Qty: 270 CAPSULE | Refills: 0 | Status: SHIPPED | OUTPATIENT
Start: 2019-12-23 | End: 2020-03-24

## 2019-12-23 NOTE — TELEPHONE ENCOUNTER
Atorvastatin       Historical   Last Office Visit: 10/25/19    Next 5 appointments (look out 90 days)    Jan 24, 2020  8:15 AM CST  (Arrive by 8:00 AM)  SHORT with Evelyn Olson MD  [facility] [place] ([facility] [place] ) [address]  [place] [address]  [phone]       duloxetine       Historical   Next 5 appointments (look out 90 days)    Jan 24, 2020  8:15 AM CST  (Arrive by 8:00 AM)  SHORT with Evelyn Olson MD  [facility] [place] ([facility] [place] ) [address]  [place] [address]  [phone]         pregabalin      Last Written Prescription Date:  10/2/19  Last Fill Quantity: 270,   # refills: 0  Last Office Visit: 10/25/19  Future Office visit:    Next 5 appointments (look out 90 days)    Jan 24, 2020  8:15 AM CST  (Arrive by 8:00 AM)  SHORT with Evelyn Olson MD  [facility] [place] ([facility] [place] ) [address]  [place] [address]  [phone]

## 2020-01-09 DIAGNOSIS — G89.4 CHRONIC PAIN SYNDROME: ICD-10-CM

## 2020-01-09 NOTE — TELEPHONE ENCOUNTER
HYDROcodone-acetaminophen (NORCO)  MG per tablet  Last visit date with prescribing provider: 10-  Last refill date: 12-2-2019  Quantity: 120, Refills: 0    Corina Robbins LPN      Next 5 appointments (look out 90 days)    Jan 24, 2020  8:15 AM CST  (Arrive by 8:00 AM)  SHORT with Evelyn Olson MD  [facility] [place] ([facility] [place] ) [facility]  [place] [address]  [phone]

## 2020-01-10 RX ORDER — HYDROCODONE BITARTRATE AND ACETAMINOPHEN 10; 325 MG/1; MG/1
1 TABLET ORAL EVERY 6 HOURS PRN
Qty: 120 TABLET | Refills: 0 | Status: SHIPPED | OUTPATIENT
Start: 2020-01-10 | End: 2020-02-10

## 2020-01-21 NOTE — PROGRESS NOTES
"Subjective     Zoran Granado is a 80 year old male who presents to clinic today for the following health issues:    HPI   Chronic Pain Follow-Up - 3 month follow up       Type / Location of Pain: low back pain  Analgesia/pain control:       Recent changes:  same      Overall control: Tolerable with discomfort  Activity level/function:      Daily activities:  Able to do moderate activities    Work:  not applicable  Adverse effects:  No  Adherance    Taking medication as directed?  Yes    Participating in other treatments: no   Risk Factors:    Sleep:  Good    Mood/anxiety:  controlled    Recent family or social stressors:  none noted    Other aggravating factors: \"when I do something\"      Last urine drug screen 7/2019.    At last visit - Lortab increased to QID.  More even pain pain control throughout the day.  Able to shovel snow.  No upcoming travel.  Just filled 1/10/20.    BP up today.  Was borderline last time. No prior treatment for hypertension.    Consider increasing Lyrica to 200 mg TID.  ? Hydrocodone to Oxycodone?      Pain diagnosis - DJD of lumbar spine, spondylolisthesis, DJD of cervical spine, sciatica, thoracic back pain.     Prior injections, rhizotomy, pain program completion in [facility].  Prior trigger point injections with PMR. Botox not covered.  Prior chiropractic.  Prior back surgery, shoulder surgery.  Prior Celebrex, Neurontin wasn't effective,  Elavil with side effects, as well as Effexor and Wellbutrin.  No history of mental health diagnoses.     Currently regimen of Lyrica and Cymbalta working well with Lortab 3 times per day on average, occasionally 4.     Regimen allows him to do yard work, cut grass, garden, household projects.     PHQ-9 SCORE 7/25/2019   PHQ-9 Total Score 1     ROBYN-7 SCORE 7/25/2019   Total Score 0     Encounter-Level CSA:    There are no encounter-level csa.     Patient-Level CSA:    Controlled Substance Agreement - Opioid - Scan on 7/26/2019 12:01 PM: OPIOD/OPIOD " PLUS CONTROLLED SUBSTANCE AGREEMENT         Current Outpatient Medications   Medication     Acetaminophen 325 MG CAPS     aspirin 81 MG tablet     atorvastatin (LIPITOR) 80 MG tablet     calcium carbonate-vitamin D 600-200 MG-UNIT CAPS     diphenhydrAMINE-acetaminophen (TYLENOL PM EXTRA STRENGTH)  MG tablet     DULoxetine (CYMBALTA) 60 MG capsule     HYDROcodone-acetaminophen (NORCO)  MG per tablet     Multiple Vitamins-Minerals (PRESERVISION AREDS 2 PO)     polyethylene glycol (MIRALAX) powder     pregabalin (LYRICA) 150 MG capsule     No current facility-administered medications for this visit.        Patient Active Problem List   Diagnosis     Multiple lipomas     Advance Care Planning     Chronic pain syndrome     Hyperlipidemia, unspecified hyperlipidemia type     Chronic, continuous use of opioids     Adjustment disorder with depressed mood     Cervicalgia     Acute pain of left shoulder     Class 1 obesity without serious comorbidity with body mass index (BMI) of 33.0 to 33.9 in adult, unspecified obesity type     Congenital spondylolisthesis     Degeneration of cervical intervertebral disc     Degeneration of lumbar or lumbosacral intervertebral disc     Elevated fasting glucose     Esophageal reflux     Impingement syndrome of left shoulder     Long term (current) use of opiate analgesic     Lumbago     Muscle weakness     Shoulder stiffness, left     Thoracic and lumbosacral neuritis     Sciatica     Hyperlipidemia     HCD (health care directive)     Obesity (BMI 35.0-39.9) with comorbidity (H)     Past Surgical History:   Procedure Laterality Date     APPENDECTOMY       ARTHROSCOPY KNEE BILATERAL       BACK SURGERY  2000    lumbar L5/S1     CATARACT IOL, RT/LT Bilateral      COLONOSCOPY       COLONOSCOPY - HIM SCAN  09/09/2004    Colonoscopy & Polypectomy - a small polyp.  Path - benign colonic mucosa     disc replacement  2001    lumbar; single level L5/S1; Dr Cage [facility]      HC ECP WITH CATARACT SURGERY  2012    both eyes     NISSEN FUNDOPLICATION       SHOULDER SURGERY      Bilateral; twice left, once right; arthroscopies     TONSILLECTOMY         Social History     Tobacco Use     Smoking status: Former Smoker     Types: Cigarettes     Last attempt to quit: 1992     Years since quittin.0     Smokeless tobacco: Never Used   Substance Use Topics     Alcohol use: Never     Frequency: Never     Family History   Problem Relation Age of Onset     Diabetes Mother      Heart Disease Mother      Heart Disease Father      Lipids Sister         5 sisters     Lipids Brother         2 brothers     Diabetes Brother      Hypertension Brother      Breast Cancer Sister         breast           Reviewed and updated as needed this visit by Provider         Review of Systems   ROS COMP: Constitutional, HEENT, cardiovascular, pulmonary, gi and gu systems are negative, except as otherwise noted.      Objective    BP (!) 142/68   Pulse 76   Temp 96.2  F (35.7  C) (Tympanic)   Resp 18   Wt 111.6 kg (246 lb)   SpO2 95%   BMI 35.55 kg/m    Body mass index is 35.55 kg/m .  Physical Exam   GENERAL: alert, no distress and over weight  NECK: no adenopathy, no asymmetry, masses, or scars and thyroid normal to palpation  RESP: lungs clear to auscultation - no rales, rhonchi or wheezes  CV: regular rate and rhythm, normal S1 S2, no S3 or S4, no murmur, click or rub, no peripheral edema and peripheral pulses strong  MS: normal muscle tone, normal range of motion, peripheral pulses normal and tenderness to palpation lumbar spine, paraspinal; negative SLR bilaterally; symmetric strength; normal gait  SKIN: no suspicious lesions or rashes  NEURO: Normal strength and tone, mentation intact and speech normal  PSYCH: mentation appears normal, affect normal/bright    Diagnostic Test Results:  Labs reviewed in Epic        Assessment & Plan       ICD-10-CM    1. Chronic pain syndrome G89.4    2. Chronic,  continuous use of opioids F11.90    3. Obesity (BMI 35.0-39.9) with comorbidity (H) E66.01    4. Degeneration of cervical intervertebral disc M50.30    5. Degeneration of lumbar or lumbosacral intervertebral disc M51.37    6. Elevated blood pressure reading R03.0         Overall stable.  Happy with results from QID Norco.  Able to be active around house, shovel, etc.    BP elevated.  SBP <150 may be reasonable for him.  Non-diabetic.  No history of heart disease.  Will monitor at home - recheck next visit.  Consider low dose ace inhibitor.    Patient Instructions   Continue current medications.  Check BP on home monitor - 1-2 times per week.  Can call or bring readings in.  Follow up 3 months.      Return in about 3 months (around 4/24/2020) for chronic pain, BP Recheck.    Evelyn Iniguez MD  [facility] [place]

## 2020-01-24 ENCOUNTER — OFFICE VISIT (OUTPATIENT)
Dept: FAMILY MEDICINE | Facility: OTHER | Age: 80
End: 2020-01-24
Attending: FAMILY MEDICINE
Payer: COMMERCIAL

## 2020-01-24 VITALS
BODY MASS INDEX: 35.55 KG/M2 | DIASTOLIC BLOOD PRESSURE: 68 MMHG | TEMPERATURE: 96.2 F | RESPIRATION RATE: 18 BRPM | OXYGEN SATURATION: 95 % | WEIGHT: 246 LBS | HEART RATE: 76 BPM | SYSTOLIC BLOOD PRESSURE: 142 MMHG

## 2020-01-24 DIAGNOSIS — E66.01 MORBID OBESITY (H): ICD-10-CM

## 2020-01-24 DIAGNOSIS — R03.0 ELEVATED BLOOD PRESSURE READING: ICD-10-CM

## 2020-01-24 DIAGNOSIS — M51.379 DEGENERATION OF LUMBAR OR LUMBOSACRAL INTERVERTEBRAL DISC: ICD-10-CM

## 2020-01-24 DIAGNOSIS — M50.30 DEGENERATION OF CERVICAL INTERVERTEBRAL DISC: ICD-10-CM

## 2020-01-24 DIAGNOSIS — F11.90 CHRONIC, CONTINUOUS USE OF OPIOIDS: Chronic | ICD-10-CM

## 2020-01-24 DIAGNOSIS — G89.4 CHRONIC PAIN SYNDROME: Primary | ICD-10-CM

## 2020-01-24 PROCEDURE — 99213 OFFICE O/P EST LOW 20 MIN: CPT | Performed by: FAMILY MEDICINE

## 2020-01-24 PROCEDURE — G0463 HOSPITAL OUTPT CLINIC VISIT: HCPCS

## 2020-01-24 ASSESSMENT — PAIN SCALES - GENERAL: PAINLEVEL: SEVERE PAIN (6)

## 2020-01-24 NOTE — LETTER
[facility]  01/24/20    Patient: Zoran Granado  YOB: 1940  Medical Record Number: [id number]                                                                  Opioid / Opioid Plus Controlled Substance Agreement    I understand that my care provider has prescribed an opioid (narcotic) controlled substance to help manage my condition(s). I am taking this medicine to help me function or work. I know this is strong medicine, and that it can cause serious side effects. Opioid medicine can be sedating, addicting and may cause a dependency on the drug. They can affect my ability to drive or think, and cause depression. They need to be taken exactly as prescribed. Combining opioids with certain medicines or chemicals (such as cocaine, sedatives and tranquilizers, sleeping pills, meth) can be dangerous or even fatal. Also, if I stop opioids suddenly, I may have severe withdrawal symptoms. Last, I understand that opioids do not work for all types of pain nor for all patients. If not helpful, I may be asked to stop them.        The risks, benefits, and side effects of these medicine(s) were explained to me. I agree that:    1. I will take part in other treatments as advised by my care team. This may be psychiatry or counseling, physical therapy, behavioral therapy, group treatment or a referral to a pain clinic. I will reduce or stop my medicine when my care team tells me to do so.  2. I will take my medicines as prescribed. I will not change the dose or schedule unless my care team tells me to. There will be no refills if I  run out early.   I may be contactedwithout warning and asked to complete a urine drug test or pill count at any time.   3. I will keep all my appointments, and understand this is part of the monitoring of opioids. My care team may require an office visit for EVERY opioid/controlled substance refill. If I miss appointments or don t follow instructions, my care team may stop my  medicine.  4. I will not ask other providers to prescribe controlled substances, and I will not accept controlled substances from other people. If I need another prescribed controlled substance for a new reason, I will tell my care team within 1 business day.  5. I will use one pharmacy to fill all of my controlled substance prescriptions, and it is up to me to make sure that I do not run out of my medicines on weekends or holidays. If my care team is willing to refill my opioid prescription without a visit, I must request refills only during office hours, refills may take up to 3 days to process, and it may take up to 5 to 7 days for my medicine to be mailed and ready at my pharmacy. Prescriptions will not be mailed anywhere except my pharmacy.        [id number]  Rev 12/18         Registration to scan to EHR                             Page 1 of 2               Controlled Substance Agreement Opioid        [facility]  01/24/20  Patient: Zoran Granado  YOB: 1940  Medical Record Number: [id number]                                                                  6. I am responsible for my prescriptions. If the medicine/prescription is lost or stolen, it will not be replaced. I also agree not to share controlled substance medicines with anyone.  7. I agree to not use ANY illegal or recreational drugs. This includes marijuana, cocaine, bath salts or other drugs. I agree not to use alcohol unless my care team says I may.          I agree to give urine samples whenever asked. If I don t give a urine sample, the care team may stop my medicine.    8. If I enroll in the [place] Medical Marijuana program, I will tell my care team. I will also sign an agreement to share my medical records with my care team.   9. I will bring in my list of medicines (or my medicine bottles) each time I come to the clinic.   10. I will tell my care team right away if I become pregnant or have a new medical problem treated  outside of my regular clinic.  11. I understand that this medicine can affect my thinking and judgment. It may be unsafe for me to drive, use machinery and do dangerous tasks. I will not do any of these things until I know how the medicine affects me. If my dose changes, I will wait to see how it affects me. I will contact my care team if I have concerns about medicine side effects.    I understand that if I do not follow any of the conditions above, my prescriptions or treatment may be stopped.      I agree that my provider, clinic care team, and pharmacy may work with any city, state or federal law enforcement agency that investigates the misuse, sale, or other diversion of my controlled medicine. I will allow my provider to discuss my care with or share a copy of this agreement with any other treating provider, pharmacy or emergency room where I receive care. I agree to give up (waive) any right of privacy or confidentiality with respect to these consents.     I have read this agreement and have asked questions about anything I did not understand.      ________________________________________________________________________  Patient signature - Date/Time -  Zoran Granado                                      ________________________________________________________________________  Witness signature                                                            ________________________________________________________________________  Provider signature - Evelyn Iniguez MD      [id number]  Rev 12/18         Registration to scan to EHR                         Page 2 of 2                   Controlled Substance Agreement Opioid           Page 1 of 2  Opioid Pain Medicines (also known as Narcotics)  What You Need to Know    What are opioids?   Opioids are pain medicines that must be prescribed by a doctor.  They are also known as narcotics.    Examples are:     morphine (MS Contin, Kristina)    oxycodone  (Oxycontin)    oxycodone and acetaminophen (Percocet)    hydrocodone and acetaminophen (Vicodin, Norco)     fentanyl patch (Duragesic)     hydromorphone (Dilaudid)     methadone     What do opioids do well?   Opioids are best for short-term pain after a surgery or injury. They also work well for cancer pain. Unlike other pain medicines, they do not cause liver or kidney failure or ulcers. They may help some people with long-lasting (chronic) pain.     What do opioids NOT do well?   Opioids never get rid of pain entirely, and they do not work well for most patients with chronic pain. Opioids do not reduce swelling, one of the causes of pain. They also don t work well for nerve pain.                           For informational purposes only.  Not to replace the advice of your care provider.  Copyright [address] [facility]. All right reserved. [employer] 136034-Mrv 02/18.      Page 2 of 2    Risks and side effects   Talk to your doctor before you start or decide to keep taking one of these medicines. Side effects include:    Lowering your breathing rate enough to cause death    Overdose, including death, especially if taking higher than prescribed doses    Long-term opioid use    Worse depression symptoms; less pleasure in things you usually enjoy    Feeling tired or sluggish    Slower thoughts or cloudy thinking    Being more sensitive to pain over time; pain is harder to control    Trouble sleeping or restless sleep    Changes in hormone levels (for example, less testosterone)    Changes in sex drive or ability to have sex    Constipation    Unsafe driving    Itching and sweating    Feeling dizzy    Nausea, vomiting and dry mouth    What else should I know about opioids?  When someone takes opioids for too long or too often, they become dependent. This means that if you stop or reduce the medicine too quickly, you will have withdrawal symptoms.    Dependence is not the same as addiction. Addiction is when  people keep using a substance that harms their body, their mind or their relations with others. If you have a history of drug or alcohol abuse, taking opioids can cause a relapse.    Over time, opioids don t work as well. Most people will need higher and higher doses. The higher the dose, the more serious the side effects. We don t know the long-term effects of opioids.      Prescribed opioids aren't the best way to manage chronic pain    Other ways to manage pain include:      Ibuprofen or acetaminophen.  You should always try this first.      Treat health problems that may be causing pain.      acupuncture or massage, deep breathing, meditation, visual imagery, aromatherapy.      Use heat or ice at the pain site      Physical therapy and exercise      Stop smoking      See a counselor or therapist                                                  People who have used opioids for a long time may have a lower quality of life, worse depression, higher levels of pain and more visits to doctors.    Never share your opioids with others. Be sure to store opioids in a secure place, locked if possible.Young children can easily swallow them and overdose.     You can overdose on opioids.  Signs of overdose include decrease or loss of consciousness, slowed breathing, trouble waking and blue lips.  If someone is worried about overdose, they should call 911.    If you are at risk for overdose, you may get naloxone (Narcan, a medicine that reverses the effects of opioids.  If you overdose, a friend or family member can give you Narcan while waiting for the ambulance.  They need to know the signs of overdose and how to give Narcan.    While you're taking opioids:    Don't use alcohol or street drugs. Taking them together can cause death.    Don't take any of these medicines unless your doctor says its okay.  Taking these with opioids can cause death.    Benzodiazepines (such as lorazepam         or diazepam)    Muscle relaxers  (such as cyclobenzaprine)    sleeping pills    other opioids    Safe disposal of opioids  Find your area drug take-back program, your pharmacy mail-back program, buy a special disposal bag (such as Deterra) from your pharmacy or flush them down the toilet.  Use the guidelines at:  www.fda.gov/drugs/resourcesforyou

## 2020-01-24 NOTE — PATIENT INSTRUCTIONS
Continue current medications.  Check BP on home monitor - 1-2 times per week.  Can call or bring readings in.  Follow up 3 months.

## 2020-01-24 NOTE — NURSING NOTE
"Chief Complaint   Patient presents with     Pain     follow up       Initial BP (!) 146/60   Pulse 76   Temp 96.2  F (35.7  C) (Tympanic)   Resp 18   Wt 111.6 kg (246 lb)   SpO2 95%   BMI 35.55 kg/m   Estimated body mass index is 35.55 kg/m  as calculated from the following:    Height as of 7/25/19: 1.772 m (5' 9.75\").    Weight as of this encounter: 111.6 kg (246 lb).  Medication Reconciliation: complete  Rand Thompson LPN  "

## 2020-02-10 ENCOUNTER — TELEPHONE (OUTPATIENT)
Dept: FAMILY MEDICINE | Facility: OTHER | Age: 80
End: 2020-02-10

## 2020-02-10 DIAGNOSIS — G89.4 CHRONIC PAIN SYNDROME: ICD-10-CM

## 2020-02-10 RX ORDER — HYDROCODONE BITARTRATE AND ACETAMINOPHEN 10; 325 MG/1; MG/1
1 TABLET ORAL EVERY 6 HOURS PRN
Qty: 120 TABLET | Refills: 0 | Status: SHIPPED | OUTPATIENT
Start: 2020-02-10 | End: 2020-03-09

## 2020-02-10 NOTE — TELEPHONE ENCOUNTER
Last time pt was in , 1-24-20, blood pressure slightly elevated, was told to monitor    Since then has been running high 140's over high 70's, pulse 60's    Corina Robbins LPN

## 2020-02-13 NOTE — PROGRESS NOTES
Subjective     Zoran rGanado is a 80 year old male who presents to clinic today for the following health issues:    HPI   Hypertension Follow-up      Do you check your blood pressure regularly outside of the clinic? Yes     Are you following a low salt diet? Yes    Are your blood pressures ever more than 140 on the top number (systolic) OR more   than 90 on the bottom number (diastolic), for example 140/90? Yes      How many servings of fruits and vegetables do you eat daily?  2-3    On average, how many sweetened beverages do you drink each day (Examples: soda, juice, sweet tea, etc.  Do NOT count diet or artificially sweetened beverages)?   0    How many days per week do you exercise enough to make your heart beat faster? 4    How many minutes a day do you exercise enough to make your heart beat faster? 20 - 29    How many days per week do you miss taking your medication? 0      Home readings 140 SBP, 70s DBP.  Down 16 pounds.  Cut carbs - no past, bread.  Increased veggies. Small portion.  Limited salt.  More salads.        Current Outpatient Medications   Medication     Acetaminophen 325 MG CAPS     aspirin 81 MG tablet     atorvastatin (LIPITOR) 80 MG tablet     calcium carbonate-vitamin D 600-200 MG-UNIT CAPS     diphenhydrAMINE-acetaminophen (TYLENOL PM EXTRA STRENGTH)  MG tablet     DULoxetine (CYMBALTA) 60 MG capsule     HYDROcodone-acetaminophen (NORCO)  MG per tablet     Multiple Vitamins-Minerals (PRESERVISION AREDS 2 PO)     polyethylene glycol (MIRALAX) powder     pregabalin (LYRICA) 150 MG capsule     No current facility-administered medications for this visit.        Patient Active Problem List   Diagnosis     Multiple lipomas     Advance Care Planning     Chronic pain syndrome     Hyperlipidemia, unspecified hyperlipidemia type     Chronic, continuous use of opioids     Adjustment disorder with depressed mood     Cervicalgia     Acute pain of left shoulder     Class 1 obesity without  serious comorbidity with body mass index (BMI) of 33.0 to 33.9 in adult, unspecified obesity type     Congenital spondylolisthesis     Degeneration of cervical intervertebral disc     Degeneration of lumbar or lumbosacral intervertebral disc     Elevated fasting glucose     Esophageal reflux     Impingement syndrome of left shoulder     Long term (current) use of opiate analgesic     Lumbago     Muscle weakness     Shoulder stiffness, left     Thoracic and lumbosacral neuritis     Sciatica     Hyperlipidemia     HCD (health care directive)     Obesity (BMI 35.0-39.9) with comorbidity (H)     Past Surgical History:   Procedure Laterality Date     APPENDECTOMY       ARTHROSCOPY KNEE BILATERAL       BACK SURGERY      lumbar L5/S1     CATARACT IOL, RT/LT Bilateral      COLONOSCOPY       COLONOSCOPY - HIM SCAN  2004    Colonoscopy & Polypectomy - a small polyp.  Path - benign colonic mucosa     disc replacement      lumbar; single level L5/S1; Dr Fauzia Renner [facility] ECP WITH CATARACT SURGERY  2012    both eyes     NISSEN FUNDOPLICATION       SHOULDER SURGERY      Bilateral; twice left, once right; arthroscopies     TONSILLECTOMY         Social History     Tobacco Use     Smoking status: Former Smoker     Types: Cigarettes     Last attempt to quit: 1992     Years since quittin.1     Smokeless tobacco: Never Used   Substance Use Topics     Alcohol use: Never     Frequency: Never     Family History   Problem Relation Age of Onset     Diabetes Mother      Heart Disease Mother      Heart Disease Father      Lipids Sister         5 sisters     Lipids Brother         2 brothers     Diabetes Brother      Hypertension Brother      Breast Cancer Sister         breast           Reviewed and updated as needed this visit by Provider         Review of Systems   ROS COMP: Constitutional, HEENT, cardiovascular, pulmonary, gi and gu systems are negative, except as otherwise noted.      Objective    BP  132/84 (BP Location: Left arm, Patient Position: Chair, Cuff Size: Adult Regular)   Pulse 67   Temp 95.7  F (35.4  C) (Tympanic)   Resp 18   Wt 104.3 kg (230 lb)   SpO2 97%   BMI 33.24 kg/m    Body mass index is 33.24 kg/m .  Physical Exam   GENERAL: healthy, alert and no distress  NECK: no adenopathy, no asymmetry, masses, or scars and thyroid normal to palpation  RESP: lungs clear to auscultation - no rales, rhonchi or wheezes  CV: regular rate and rhythm, normal S1 S2, no S3 or S4, no murmur, click or rub, no peripheral edema and peripheral pulses strong  ABDOMEN: soft, nontender, no hepatosplenomegaly, no masses and bowel sounds normal  MS: no gross musculoskeletal defects noted, no edema  PSYCH: mentation appears normal, affect normal/bright    Diagnostic Test Results:  Labs reviewed in Epic        Assessment & Plan       ICD-10-CM    1. Elevated blood pressure reading without diagnosis of hypertension R03.0    2. Obesity (BMI 30.0-34.9) E66.9           Patient Instructions   Continue BP monitoring - checking weekly is fine - no need for daily.  Continue healthy lifestyle/diet changes, weight management.  Follow up as scheduled already - will recheck BP then.      No follow-ups on file.    Evelyn Iniguez MD  [facility]

## 2020-02-14 ENCOUNTER — OFFICE VISIT (OUTPATIENT)
Dept: FAMILY MEDICINE | Facility: OTHER | Age: 80
End: 2020-02-14
Attending: FAMILY MEDICINE
Payer: COMMERCIAL

## 2020-02-14 VITALS
RESPIRATION RATE: 18 BRPM | WEIGHT: 230 LBS | HEART RATE: 67 BPM | SYSTOLIC BLOOD PRESSURE: 132 MMHG | OXYGEN SATURATION: 97 % | BODY MASS INDEX: 33.24 KG/M2 | TEMPERATURE: 95.7 F | DIASTOLIC BLOOD PRESSURE: 84 MMHG

## 2020-02-14 DIAGNOSIS — E66.811 OBESITY (BMI 30.0-34.9): ICD-10-CM

## 2020-02-14 DIAGNOSIS — R03.0 ELEVATED BLOOD PRESSURE READING WITHOUT DIAGNOSIS OF HYPERTENSION: Primary | ICD-10-CM

## 2020-02-14 PROCEDURE — 99213 OFFICE O/P EST LOW 20 MIN: CPT | Performed by: FAMILY MEDICINE

## 2020-02-14 PROCEDURE — G0463 HOSPITAL OUTPT CLINIC VISIT: HCPCS

## 2020-02-14 ASSESSMENT — PAIN SCALES - GENERAL: PAINLEVEL: SEVERE PAIN (7)

## 2020-02-14 NOTE — PATIENT INSTRUCTIONS
Continue BP monitoring - checking weekly is fine - no need for daily.  Continue healthy lifestyle/diet changes, weight management.  Follow up as scheduled already - will recheck BP then.

## 2020-02-14 NOTE — NURSING NOTE
"Chief Complaint   Patient presents with     Hypertension       Initial /84 (BP Location: Left arm, Patient Position: Chair, Cuff Size: Adult Regular)   Pulse 67   Temp 95.7  F (35.4  C) (Tympanic)   Resp 18   Wt 104.3 kg (230 lb)   SpO2 97%   BMI 33.24 kg/m   Estimated body mass index is 33.24 kg/m  as calculated from the following:    Height as of 7/25/19: 1.772 m (5' 9.75\").    Weight as of this encounter: 104.3 kg (230 lb).  Medication Reconciliation: complete  Milena Kearns LPN    "

## 2020-03-09 DIAGNOSIS — G89.4 CHRONIC PAIN SYNDROME: ICD-10-CM

## 2020-03-09 RX ORDER — HYDROCODONE BITARTRATE AND ACETAMINOPHEN 10; 325 MG/1; MG/1
1 TABLET ORAL EVERY 6 HOURS PRN
Qty: 120 TABLET | Refills: 0 | Status: SHIPPED | OUTPATIENT
Start: 2020-03-09 | End: 2020-04-08

## 2020-03-09 NOTE — TELEPHONE ENCOUNTER
HYDROcodone-acetaminophen (NORCO)  MG per tablet       Last Written Prescription Date:  02/10/20  Last Fill Quantity: 120,   # refills: 0  Last Office Visit: 02/14/20  Future Office visit:    Next 5 appointments (look out 90 days)    Apr 23, 2020  8:45 AM CDT  (Arrive by 8:30 AM)  SHORT with Evelyn Olson MD  [facility] ([facility] ) [address]  [place] [address]  [phone]           Routing refill request to provider for review/approval because:  Drug not on the [facility], [facility] or [facility] refill protocol or controlled substance

## 2020-03-24 DIAGNOSIS — G89.29 CHRONIC LEFT-SIDED LOW BACK PAIN WITH LEFT-SIDED SCIATICA: ICD-10-CM

## 2020-03-24 DIAGNOSIS — M54.42 CHRONIC LEFT-SIDED LOW BACK PAIN WITH LEFT-SIDED SCIATICA: ICD-10-CM

## 2020-03-24 RX ORDER — PREGABALIN 150 MG/1
150 CAPSULE ORAL 3 TIMES DAILY
Qty: 270 CAPSULE | Refills: 1 | Status: SHIPPED | OUTPATIENT
Start: 2020-03-24 | End: 2020-10-05

## 2020-03-24 NOTE — TELEPHONE ENCOUNTER
lyrica      Last Written Prescription Date:  12/23/19  Last Fill Quantity: 270,   # refills: 0  Last Office Visit: 2/14/20  Future Office visit:    Next 5 appointments (look out 90 days)    Apr 23, 2020  8:45 AM CDT  (Arrive by 8:30 AM)  SHORT with Evelyn Olson MD  [facility] ([facility] ) [address]  [place] [address]  [phone]           Routing refill request to provider for review/approval because:  Drug not on the [facility], [facility] or [facility] refill protocol or controlled substance

## 2020-03-25 DIAGNOSIS — G89.29 CHRONIC LEFT-SIDED LOW BACK PAIN WITH LEFT-SIDED SCIATICA: ICD-10-CM

## 2020-03-25 DIAGNOSIS — M54.42 CHRONIC LEFT-SIDED LOW BACK PAIN WITH LEFT-SIDED SCIATICA: ICD-10-CM

## 2020-03-25 RX ORDER — DULOXETIN HYDROCHLORIDE 60 MG/1
60 CAPSULE, DELAYED RELEASE ORAL 2 TIMES DAILY
Qty: 180 CAPSULE | Refills: 0 | Status: SHIPPED | OUTPATIENT
Start: 2020-03-25 | End: 2020-07-27

## 2020-03-25 NOTE — TELEPHONE ENCOUNTER
Duloxetine      Last Written Prescription Date:  12/23/19  Last Fill Quantity: 180,   # refills: 0  Last Office Visit: 02/14/20  Future Office visit:    Next 5 appointments (look out 90 days)    Apr 23, 2020  8:45 AM CDT  (Arrive by 8:30 AM)  SHORT [facility]  [facility] ) [address]  [place] [address]  [phone]           Routing refill request to provider for review/approval because:

## 2020-04-08 DIAGNOSIS — G89.4 CHRONIC PAIN SYNDROME: ICD-10-CM

## 2020-04-08 RX ORDER — HYDROCODONE BITARTRATE AND ACETAMINOPHEN 10; 325 MG/1; MG/1
1 TABLET ORAL EVERY 6 HOURS PRN
Qty: 120 TABLET | Refills: 0 | Status: SHIPPED | OUTPATIENT
Start: 2020-04-08 | End: 2020-05-07

## 2020-04-08 NOTE — TELEPHONE ENCOUNTER
Norco    Last Written Prescription Date:  3/9/20  Last Fill Quantity: 120,   # refills: 0  Last Office Visit: 2/14/20  Future Office visit:    Next 5 appointments (look out 90 days)    Apr 23, 2020  8:45 AM CDT  Telephone Visit with Evelyn Olson MD  [facility] ([facility] ) [address] [place] [address]  [phone]           Routing refill request to provider for review/approval because:    Drug not on the [facility], [facility] or [facility] refill protocol or controlled substance    Chronic, continuous use of opioids (Chronic)   Problem Detail     Noted:  7/25/2019    Priority:  Medium    Overview Addendum 7/26/2019  4:25 PM by Blanquita Cavazos RN    Patient is followed by Evelyn Olson MD for ongoing prescription of pain medication.  All refills should only be approved by this provider, or covering partner.     Medication(s): Norco 10/325mg.   Maximum quantity per month: #95  Clinic visit frequency required: Q 3 months      Controlled substance agreement:  Encounter-Level CSA:    There are no encounter-level csa.      Patient-Level CSA:    Controlled Substance Agreement - Opioid - Scan on 7/26/2019 12:01 PM: OPIOD/OPIOD PLUS CONTROLLED SUBSTANCE AGREEMENT (below)            Pain Clinic evaluation in the past: No     DIRE Total Score(s):  No flowsheet data found.     Last [facility] website verification:  done on 7.25.19.   [email or web address]

## 2020-04-21 NOTE — PROGRESS NOTES
"Zoran Granado is a 80 year old male who is being evaluated via a billable telephone visit.      The patient has been notified of following:     \"This telephone visit will be conducted via a call between you and your physician/provider. We have found that certain health care needs can be provided without the need for a physical exam.  This service lets us provide the care you need with a short phone conversation.  If a prescription is necessary we can send it directly to your pharmacy.  If lab work is needed we can place an order for that and you can then stop by our lab to have the test done at a later time.    Telephone visits are billed at different rates depending on your insurance coverage. During this emergency period, for some insurers they may be billed the same as an in-person visit.  Please reach out to your insurance provider with any questions.    If during the course of the call the physician/provider feels a telephone visit is not appropriate, you will not be charged for this service.\"    Patient has given verbal consent for Telephone visit?  Yes    How would you like to obtain your AVS? PT DECLINED    Subjective     Zoran Granado is a 80 year old male who presents to clinic today for the following health issues:    HPI  Hypertension Follow-up    Do you check your blood pressure regularly outside of the clinic? Yes     Are you following a low salt diet? Yes    Are your blood pressures ever more than 140 on the top number (systolic) OR more   than 90 on the bottom number (diastolic), for example 140/90? No   Home readings \"good\" - 130s/70s.  No headaches, dizziness.    Chronic Pain Follow-Up  Where in your body do you have pain? Left shoulder, left hip, and low back  How has your pain affected your ability to work? Not applicable  Which of these pain treatments have you tried since your last clinic visit? Other: none  How well are you sleeping? Fair  How has your mood been since your last visit? " About the same  Have you had a significant life event? Grief or Loss- family members passed  Other aggravating factors: prolonged sitting, prolonged standing, poor posture and repetitive activities - etc  Taking medication as directed? Yes  On Lyrica, Cymbalta, and Norco up to 4 times per day.  Doing walking, couple times around the blocks, 3 times per week.  Denies chest pain or dyspnea.  No change in pain overall.  Back pain is fairly constant.      PHQ-9 SCORE 7/25/2019   PHQ-9 Total Score 1     ROBYN-7 SCORE 7/25/2019   Total Score 0     No flowsheet data found.  Encounter-Level CSA:    There are no encounter-level csa.     Patient-Level CSA:    Controlled Substance Agreement - Opioid - Scan on 7/26/2019 12:01 PM: OPIOD/OPIOD PLUS CONTROLLED SUBSTANCE AGREEMENT         How many servings of fruits and vegetables do you eat daily?  2-3    On average, how many sweetened beverages do you drink each day (Examples: soda, juice, sweet tea, etc.  Do NOT count diet or artificially sweetened beverages)?   0    How many days per week do you exercise enough to make your heart beat faster? 3 or less    How many minutes a day do you exercise enough to make your heart beat faster? 20 - 29    How many days per week do you miss taking your medication? 0       Current Outpatient Medications   Medication     Acetaminophen 325 MG CAPS     aspirin 81 MG tablet     atorvastatin (LIPITOR) 80 MG tablet     calcium carbonate-vitamin D 600-200 MG-UNIT CAPS     diphenhydrAMINE-acetaminophen (TYLENOL PM EXTRA STRENGTH)  MG tablet     DULoxetine (CYMBALTA) 60 MG capsule     HYDROcodone-acetaminophen (NORCO)  MG per tablet     Multiple Vitamins-Minerals (PRESERVISION AREDS 2 PO)     polyethylene glycol (MIRALAX) powder     pregabalin (LYRICA) 150 MG capsule     No current facility-administered medications for this visit.        Patient Active Problem List   Diagnosis     Multiple lipomas     Advance Care Planning     Chronic pain  syndrome     Hyperlipidemia, unspecified hyperlipidemia type     Chronic, continuous use of opioids     Adjustment disorder with depressed mood     Cervicalgia     Acute pain of left shoulder     Class 1 obesity without serious comorbidity with body mass index (BMI) of 33.0 to 33.9 in adult, unspecified obesity type     Congenital spondylolisthesis     Degeneration of cervical intervertebral disc     Degeneration of lumbar or lumbosacral intervertebral disc     Elevated fasting glucose     Esophageal reflux     Impingement syndrome of left shoulder     Long term (current) use of opiate analgesic     Lumbago     Muscle weakness     Shoulder stiffness, left     Thoracic and lumbosacral neuritis     Sciatica     Hyperlipidemia     HCD (health care directive)     Obesity (BMI 35.0-39.9) with comorbidity (H)     Obesity (BMI 30.0-34.9)     Past Surgical History:   Procedure Laterality Date     APPENDECTOMY       ARTHROSCOPY KNEE BILATERAL       BACK SURGERY      lumbar L5/S1     CATARACT IOL, RT/LT Bilateral      COLONOSCOPY       COLONOSCOPY - HIM SCAN  2004    Colonoscopy & Polypectomy - a small polyp.  Path - benign colonic mucosa     disc replacement      lumbar; single level L5/S1; Dr Fauzia Ren[facility] ECP WITH CATARACT SURGERY      both eyes     NISSEN FUNDOPLICATION       SHOULDER SURGERY      Bilateral; twice left, once right; arthroscopies     TONSILLECTOMY         Social History     Tobacco Use     Smoking status: Former Smoker     Types: Cigarettes     Last attempt to quit: 1992     Years since quittin.3     Smokeless tobacco: Never Used   Substance Use Topics     Alcohol use: Never     Frequency: Never     Family History   Problem Relation Age of Onset     Diabetes Mother      Heart Disease Mother      Heart Disease Father      Lipids Sister         5 sisters     Lipids Brother         2 brothers     Diabetes Brother      Hypertension Brother      Breast Cancer  "Sister         breast           Reviewed and updated as needed this visit by Provider         Review of Systems   ROS COMP: Constitutional, HEENT, cardiovascular, pulmonary, gi and gu systems are negative, except as otherwise noted.       Objective   Reported vitals:  Ht 1.778 m (5' 10\")   Wt 99.8 kg (220 lb)   BMI 31.57 kg/m       Diagnostic Test Results:  Labs reviewed in Epic        Assessment/Plan:  1. Chronic pain syndrome  Stable.  Able to do daily activities.  Taking medications as prescribed.  Happy with results.    2. Chronic, continuous use of opioids  As above.    3. Obesity (BMI 35.0-39.9) with comorbidity (H)  Continues to work on weight management.    4. Elevated blood pressure reading without diagnosis of hypertension  Home BP readings stable and at goal.  Will want to recheck in office when able at next visit.      Return in about 3 months (around 7/23/2020) for chronic pain.      Phone call duration:  5 minutes    Evelyn Iniguez MD        "

## 2020-04-23 ENCOUNTER — VIRTUAL VISIT (OUTPATIENT)
Dept: FAMILY MEDICINE | Facility: OTHER | Age: 80
End: 2020-04-23
Attending: FAMILY MEDICINE
Payer: COMMERCIAL

## 2020-04-23 VITALS — WEIGHT: 220 LBS | BODY MASS INDEX: 31.5 KG/M2 | HEIGHT: 70 IN

## 2020-04-23 DIAGNOSIS — F11.90 CHRONIC, CONTINUOUS USE OF OPIOIDS: Chronic | ICD-10-CM

## 2020-04-23 DIAGNOSIS — E66.01 MORBID OBESITY (H): ICD-10-CM

## 2020-04-23 DIAGNOSIS — G89.4 CHRONIC PAIN SYNDROME: Primary | ICD-10-CM

## 2020-04-23 DIAGNOSIS — R03.0 ELEVATED BLOOD PRESSURE READING WITHOUT DIAGNOSIS OF HYPERTENSION: ICD-10-CM

## 2020-04-23 PROCEDURE — 99213 OFFICE O/P EST LOW 20 MIN: CPT | Mod: 95 | Performed by: FAMILY MEDICINE

## 2020-04-23 ASSESSMENT — MIFFLIN-ST. JEOR: SCORE: 1724.16

## 2020-04-23 ASSESSMENT — PAIN SCALES - GENERAL: PAINLEVEL: SEVERE PAIN (7)

## 2020-04-23 NOTE — NURSING NOTE
"Chief Complaint   Patient presents with     Hypertension     Pain     chronic       Initial Ht 1.778 m (5' 10\")   Wt 99.8 kg (220 lb)   BMI 31.57 kg/m   Estimated body mass index is 31.57 kg/m  as calculated from the following:    Height as of this encounter: 1.778 m (5' 10\").    Weight as of this encounter: 99.8 kg (220 lb).  Medication Reconciliation: complete  Erica Cox LPN  "

## 2020-05-07 DIAGNOSIS — G89.4 CHRONIC PAIN SYNDROME: ICD-10-CM

## 2020-05-07 RX ORDER — HYDROCODONE BITARTRATE AND ACETAMINOPHEN 10; 325 MG/1; MG/1
1 TABLET ORAL EVERY 6 HOURS PRN
Qty: 120 TABLET | Refills: 0 | Status: SHIPPED | OUTPATIENT
Start: 2020-05-07 | End: 2020-06-08

## 2020-05-07 NOTE — TELEPHONE ENCOUNTER
Norco  Last Written Prescription Date:  4.8.220  Last Fill Quantity: 120   # refills: 0  Last Office Visit: 4.23.2020  Future Office visit:    Next 5 appointments (look out 90 days)    Jul 29, 2020  8:15 AM CDT  (Arrive by 8:00 AM)  [facility]  [facility] [address]  [place] [address]  [phone]           Routing refill request to provider for review/approval because:  Drug not on the [facility], [facility] or  Health refill protocol or controlled substance

## 2020-05-18 DIAGNOSIS — E78.5 HYPERLIPIDEMIA, UNSPECIFIED HYPERLIPIDEMIA TYPE: ICD-10-CM

## 2020-05-18 RX ORDER — ATORVASTATIN CALCIUM 80 MG/1
80 TABLET, FILM COATED ORAL DAILY
Qty: 90 TABLET | Refills: 0 | Status: SHIPPED | OUTPATIENT
Start: 2020-05-18 | End: 2020-07-27

## 2020-06-08 DIAGNOSIS — G89.4 CHRONIC PAIN SYNDROME: ICD-10-CM

## 2020-06-08 RX ORDER — HYDROCODONE BITARTRATE AND ACETAMINOPHEN 10; 325 MG/1; MG/1
1 TABLET ORAL EVERY 6 HOURS PRN
Qty: 120 TABLET | Refills: 0 | Status: SHIPPED | OUTPATIENT
Start: 2020-06-08 | End: 2020-07-09

## 2020-06-08 NOTE — TELEPHONE ENCOUNTER
Patient called for script    HYDROcodone-acetaminophen (NORCO)  MG per tablet      Last Written Prescription Date:  5/7/20  Last Fill Quantity: 120,   # refills: 0  Last Office Visit: 4/23/20  Future Office visit:    Next 5 appointments (look out 90 days)    Jul 29, 2020  8:15 AM CDT  (Arrive by 8:00 AM)  SHORT with Evelyn Olson MD  [facility] ([facility] ) [address]  [place] [address]  [phone]           Routing refill request to provider for review/approval because:  Drug not on the [facility], [facility] or  Health refill protocol or controlled substance

## 2020-07-08 DIAGNOSIS — G89.4 CHRONIC PAIN SYNDROME: ICD-10-CM

## 2020-07-08 NOTE — TELEPHONE ENCOUNTER
Not on protocol, please advise.    HYDROcodone-acetaminophen (NORCO)  MG per tablet       Last Written Prescription Date:  6/8/20  Last Fill Quantity: 120,   # refills: 0  Last Office Visit: 4/23/20  Future Office visit:    Next 5 appointments (look out 90 days)    Jul 29, 2020  8:15 AM CDT  (Arrive by 8:00 AM)  SHORT with Evelyn Olson MD  [facility] ) [address]  [place] [address]  [phone]           Routing refill request to provider for review/approval because:  Drug not on the [facility], P or [facility] refill protocol or controlled substance

## 2020-07-09 RX ORDER — HYDROCODONE BITARTRATE AND ACETAMINOPHEN 10; 325 MG/1; MG/1
1 TABLET ORAL EVERY 6 HOURS PRN
Qty: 120 TABLET | Refills: 0 | Status: SHIPPED | OUTPATIENT
Start: 2020-07-09 | End: 2020-08-10

## 2020-07-27 DIAGNOSIS — E78.5 HYPERLIPIDEMIA, UNSPECIFIED HYPERLIPIDEMIA TYPE: ICD-10-CM

## 2020-07-27 DIAGNOSIS — G89.29 CHRONIC LEFT-SIDED LOW BACK PAIN WITH LEFT-SIDED SCIATICA: ICD-10-CM

## 2020-07-27 DIAGNOSIS — M54.42 CHRONIC LEFT-SIDED LOW BACK PAIN WITH LEFT-SIDED SCIATICA: ICD-10-CM

## 2020-07-27 RX ORDER — ATORVASTATIN CALCIUM 80 MG/1
80 TABLET, FILM COATED ORAL DAILY
Qty: 90 TABLET | Refills: 0 | Status: SHIPPED | OUTPATIENT
Start: 2020-07-27 | End: 2020-10-27

## 2020-07-27 RX ORDER — DULOXETIN HYDROCHLORIDE 60 MG/1
60 CAPSULE, DELAYED RELEASE ORAL 2 TIMES DAILY
Qty: 180 CAPSULE | Refills: 1 | Status: SHIPPED | OUTPATIENT
Start: 2020-07-27 | End: 2020-10-27

## 2020-07-28 NOTE — PROGRESS NOTES
Subjective     Zoran Granado is a 80 year old male who presents to clinic today for the following health issues:    HPI       Chronic Pain Follow-Up    Where in your body do you have pain? Left shoulder, left hip and low back and neck - pretty equal amongst  How has your pain affected your ability to work? Not applicable  Which of these pain treatments have you tried since your last clinic visit? Other: pain medication  How well are you sleeping? Fair  How has your mood been since your last visit? About the same  Have you had a significant life event? No  Other aggravating factors: prolonged sitting, prolonged standing, poor posture and repetitive activities - etc  Taking medication as directed? Yes    Prior shoulder surgeries - bilateral - twice left, once right.  Prior knee arthroscopy.  No prior hip surgeries.    No recent imaging.    Prior injections, rhizotomy, pain program completion in [facility].  Prior trigger point injections with PMR. Botox not covered.  Prior chiropractic.  Prior back surgery, shoulder surgery.  Prior Celebrex, Neurontin wasn't effective,  Elavil with side effects, as well as Effexor and Wellbutrin.    Been walking regularly, planting flowers/garden.    No progression of symptoms.    Numbness, tingling, left hand and left leg.  No progression.  No weakness.  Bowel/bladder normal function.  No fever.  No abnormal bleeding.      Lost 4 family members since last visit.    PHQ-9 SCORE 7/25/2019 7/29/2020   PHQ-9 Total Score 1 0     ROBYN-7 SCORE 7/25/2019 7/29/2020   Total Score 0 0     No flowsheet data found.  Encounter-Level CSA:    There are no encounter-level csa.     Patient-Level CSA:    Controlled Substance Agreement - Opioid - Scan on 7/26/2019 12:01 PM: OPIOD/OPIOD PLUS CONTROLLED SUBSTANCE AGREEMENT         How many servings of fruits and vegetables do you eat daily?  2-3    On average, how many sweetened beverages do you drink each day (Examples: soda, juice, sweet tea, etc.  Do  NOT count diet or artificially sweetened beverages)?   0    How many days per week do you exercise enough to make your heart beat faster? 3 or less    How many minutes a day do you exercise enough to make your heart beat faster? 20 - 29    How many days per week do you miss taking your medication? 0    Regimen - Lyrica at max, Cymbalta at max, and Norco (up to 4 times per day).  This working great.  Denies side effects.  No constipation if takes fiber and Miralax.    Current Outpatient Medications   Medication     Acetaminophen 325 MG CAPS     aspirin 81 MG tablet     atorvastatin (LIPITOR) 80 MG tablet     calcium carbonate-vitamin D 600-200 MG-UNIT CAPS     diphenhydrAMINE-acetaminophen (TYLENOL PM EXTRA STRENGTH)  MG tablet     DULoxetine (CYMBALTA) 60 MG capsule     HYDROcodone-acetaminophen (NORCO)  MG per tablet     Multiple Vitamins-Minerals (PRESERVISION AREDS 2 PO)     polyethylene glycol (MIRALAX) powder     pregabalin (LYRICA) 150 MG capsule     No current facility-administered medications for this visit.        Patient Active Problem List   Diagnosis     Multiple lipomas     Advance Care Planning     Chronic pain syndrome     Hyperlipidemia, unspecified hyperlipidemia type     Chronic, continuous use of opioids     Adjustment disorder with depressed mood     Cervicalgia     Acute pain of left shoulder     Class 1 obesity without serious comorbidity with body mass index (BMI) of 33.0 to 33.9 in adult, unspecified obesity type     Congenital spondylolisthesis     Degeneration of cervical intervertebral disc     Degeneration of lumbar or lumbosacral intervertebral disc     Elevated fasting glucose     Esophageal reflux     Impingement syndrome of left shoulder     Long term (current) use of opiate analgesic     Lumbago     Muscle weakness     Shoulder stiffness, left     Thoracic and lumbosacral neuritis     Sciatica     Hyperlipidemia     HCD (health care directive)     Obesity (BMI 35.0-39.9)  "with comorbidity (H)     Obesity (BMI 30.0-34.9)     Past Surgical History:   Procedure Laterality Date     APPENDECTOMY       ARTHROSCOPY KNEE BILATERAL       BACK SURGERY      lumbar L5/S1     CATARACT IOL, RT/LT Bilateral      COLONOSCOPY       COLONOSCOPY - HIM SCAN  2004    Colonoscopy & Polypectomy - a small polyp.  Path - benign colonic mucosa     disc replacement      lumbar; single level L5/S1; Dr Cage - [facility] ECP WITH CATARACT SURGERY      both eyes     NISSEN FUNDOPLICATION       SHOULDER SURGERY      Bilateral; twice left, once right; arthroscopies     TONSILLECTOMY         Social History     Tobacco Use     Smoking status: Former Smoker     Types: Cigarettes     Last attempt to quit: 1992     Years since quittin.5     Smokeless tobacco: Never Used   Substance Use Topics     Alcohol use: Never     Frequency: Never     Family History   Problem Relation Age of Onset     Diabetes Mother      Heart Disease Mother      Heart Disease Father      Lipids Sister         5 sisters     Lipids Brother         2 brothers     Diabetes Brother      Hypertension Brother      Breast Cancer Sister         breast           Reviewed and updated as needed this visit by Provider  Tobacco  Allergies  Meds  Med Hx  Surg Hx  Fam Hx  Soc Hx        Review of Systems   Constitutional, HEENT, cardiovascular, pulmonary, gi and gu systems are negative, except as otherwise noted.      Objective    /78   Pulse 65   Temp 97.3  F (36.3  C) (Tympanic)   Resp 19   Ht 1.778 m (5' 10\")   Wt 106.6 kg (235 lb)   SpO2 97%   BMI 33.72 kg/m    Body mass index is 33.72 kg/m .  Physical Exam   GENERAL: alert, no distress and over weight  NECK: no adenopathy, no asymmetry, masses, or scars and thyroid normal to palpation  RESP: lungs clear to auscultation - no rales, rhonchi or wheezes  CV: regular rate and rhythm, normal S1 S2, no S3 or S4, no murmur, click or rub, no peripheral " "edema and peripheral pulses strong  MS: normal muscle tone, peripheral pulses normal and tenderness to palpation lumbar spine; negative SLR bilaterally; sensation intact to light touch; symmetric strength and reflexes; neck ROM limited with side flexion bilaterally; normal gait  SKIN: no suspicious lesions or rashes  NEURO: Normal strength and tone, mentation intact and speech normal  PSYCH: mentation appears normal, affect normal/bright    Diagnostic Test Results:  Labs reviewed in Epic  Annual urine drug screen to be done today        Assessment & Plan       ICD-10-CM    1. Chronic pain syndrome  G89.4 Pain Drug Scr UR W Rptd Meds   2. Chronic, continuous use of opioids  F11.90 Pain Drug Scr UR W Rptd Meds   3. Degeneration of lumbar or lumbosacral intervertebral disc  M51.37 Pain Drug Scr UR W Rptd Meds   4. Degeneration of cervical intervertebral disc  M50.30 Pain Drug Scr UR W Rptd Meds   5. Obesity (BMI 30.0-34.9)  E66.9         BMI:   Estimated body mass index is 33.72 kg/m  as calculated from the following:    Height as of this encounter: 1.778 m (5' 10\").    Weight as of this encounter: 106.6 kg (235 lb).   Weight management plan: Discussed healthy diet and exercise guidelines    Overall doing well.  Able to perform ADLs with independence.  Able to enjoy some outdoor activities, walks, gardening.  Stable symptoms and tolerating medications.      Patient Instructions   Continue current medication regimen.  Follow up 3 months.      Return in about 3 months (around 10/29/2020) for chronic pain.    Evelyn Iniguez MD  [facility] [place]    "

## 2020-07-29 ENCOUNTER — OFFICE VISIT (OUTPATIENT)
Dept: FAMILY MEDICINE | Facility: OTHER | Age: 80
End: 2020-07-29
Attending: FAMILY MEDICINE
Payer: COMMERCIAL

## 2020-07-29 VITALS
WEIGHT: 235 LBS | RESPIRATION RATE: 19 BRPM | SYSTOLIC BLOOD PRESSURE: 128 MMHG | HEIGHT: 70 IN | OXYGEN SATURATION: 97 % | HEART RATE: 65 BPM | TEMPERATURE: 97.3 F | BODY MASS INDEX: 33.64 KG/M2 | DIASTOLIC BLOOD PRESSURE: 78 MMHG

## 2020-07-29 DIAGNOSIS — M50.30 DEGENERATION OF CERVICAL INTERVERTEBRAL DISC: ICD-10-CM

## 2020-07-29 DIAGNOSIS — M51.379 DEGENERATION OF LUMBAR OR LUMBOSACRAL INTERVERTEBRAL DISC: ICD-10-CM

## 2020-07-29 DIAGNOSIS — F11.90 CHRONIC, CONTINUOUS USE OF OPIOIDS: Chronic | ICD-10-CM

## 2020-07-29 DIAGNOSIS — G89.4 CHRONIC PAIN SYNDROME: Primary | ICD-10-CM

## 2020-07-29 DIAGNOSIS — E66.811 OBESITY (BMI 30.0-34.9): ICD-10-CM

## 2020-07-29 PROCEDURE — 99000 SPECIMEN HANDLING OFFICE-LAB: CPT | Performed by: FAMILY MEDICINE

## 2020-07-29 PROCEDURE — 80307 DRUG TEST PRSMV CHEM ANLYZR: CPT | Mod: ZL | Performed by: FAMILY MEDICINE

## 2020-07-29 PROCEDURE — G0463 HOSPITAL OUTPT CLINIC VISIT: HCPCS

## 2020-07-29 PROCEDURE — 99213 OFFICE O/P EST LOW 20 MIN: CPT | Performed by: FAMILY MEDICINE

## 2020-07-29 RX ORDER — ATORVASTATIN CALCIUM 80 MG/1
TABLET, FILM COATED ORAL
Qty: 90 TABLET | Refills: 0 | OUTPATIENT
Start: 2020-07-29

## 2020-07-29 RX ORDER — DULOXETIN HYDROCHLORIDE 60 MG/1
CAPSULE, DELAYED RELEASE ORAL
Qty: 180 CAPSULE | Refills: 0 | OUTPATIENT
Start: 2020-07-29

## 2020-07-29 ASSESSMENT — ANXIETY QUESTIONNAIRES
GAD7 TOTAL SCORE: 0
2. NOT BEING ABLE TO STOP OR CONTROL WORRYING: NOT AT ALL
IF YOU CHECKED OFF ANY PROBLEMS ON THIS QUESTIONNAIRE, HOW DIFFICULT HAVE THESE PROBLEMS MADE IT FOR YOU TO DO YOUR WORK, TAKE CARE OF THINGS AT HOME, OR GET ALONG WITH OTHER PEOPLE: NOT DIFFICULT AT ALL
3. WORRYING TOO MUCH ABOUT DIFFERENT THINGS: NOT AT ALL
1. FEELING NERVOUS, ANXIOUS, OR ON EDGE: NOT AT ALL
7. FEELING AFRAID AS IF SOMETHING AWFUL MIGHT HAPPEN: NOT AT ALL
5. BEING SO RESTLESS THAT IT IS HARD TO SIT STILL: NOT AT ALL
6. BECOMING EASILY ANNOYED OR IRRITABLE: NOT AT ALL

## 2020-07-29 ASSESSMENT — MIFFLIN-ST. JEOR: SCORE: 1792.2

## 2020-07-29 ASSESSMENT — PAIN SCALES - GENERAL: PAINLEVEL: NO PAIN (0)

## 2020-07-29 ASSESSMENT — PATIENT HEALTH QUESTIONNAIRE - PHQ9
SUM OF ALL RESPONSES TO PHQ QUESTIONS 1-9: 0
5. POOR APPETITE OR OVEREATING: NOT AT ALL

## 2020-07-29 NOTE — NURSING NOTE
"Chief Complaint   Patient presents with     Pain       Initial /78   Pulse 65   Temp 97.3  F (36.3  C) (Tympanic)   Resp 19   Ht 1.778 m (5' 10\")   Wt 106.6 kg (235 lb)   SpO2 97%   BMI 33.72 kg/m   Estimated body mass index is 33.72 kg/m  as calculated from the following:    Height as of this encounter: 1.778 m (5' 10\").    Weight as of this encounter: 106.6 kg (235 lb).  Medication Reconciliation: complete  Marilyn Pascual LPN    "

## 2020-07-30 ASSESSMENT — ANXIETY QUESTIONNAIRES: GAD7 TOTAL SCORE: 0

## 2020-07-31 LAB — PAIN DRUG SCR UR W RPTD MEDS: NORMAL

## 2020-08-10 DIAGNOSIS — G89.4 CHRONIC PAIN SYNDROME: ICD-10-CM

## 2020-08-10 RX ORDER — HYDROCODONE BITARTRATE AND ACETAMINOPHEN 10; 325 MG/1; MG/1
1 TABLET ORAL EVERY 6 HOURS PRN
Qty: 120 TABLET | Refills: 0 | Status: SHIPPED | OUTPATIENT
Start: 2020-08-10 | End: 2020-09-10

## 2020-08-10 NOTE — TELEPHONE ENCOUNTER
HYDROcodone-acetaminophen (NORCO)  MG per tablet       Last Written Prescription Date:  7/9/20  Last Fill Quantity: 120,   # refills: 0  Last Office Visit: 7/29/20  Future Office visit:    Next 5 appointments (look out 90 days)    Oct 29, 2020  8:45 AM CDT  (Arrive by 8:30 AM)  SHORT with Evelyn Olson MD  [facility] ) [address]  [place] [address]  [phone]           Routing refill request to provider for review/approval because:  Drug not on the [facility], [facility] or [facility] refill protocol or controlled substance

## 2020-09-08 DIAGNOSIS — G89.4 CHRONIC PAIN SYNDROME: ICD-10-CM

## 2020-09-10 RX ORDER — HYDROCODONE BITARTRATE AND ACETAMINOPHEN 10; 325 MG/1; MG/1
1 TABLET ORAL EVERY 6 HOURS PRN
Qty: 120 TABLET | Refills: 0 | Status: SHIPPED | OUTPATIENT
Start: 2020-09-10 | End: 2020-10-09

## 2020-09-10 NOTE — TELEPHONE ENCOUNTER
hydrocodone      Last Written Prescription Date:  8/10/20  Last Fill Quantity: 120,   # refills: 0  Last Office Visit: 7/29/10  Future Office visit:    Next 5 appointments (look out 90 days)    Oct 29, 2020  8:45 AM CDT  (Arrive by 8:30 AM)  SHORT with Evelyn Olson MD  [facility] ) [address]  [place] [address]  [phone]           Routing refill request to provider for review/approval because:  Drug not on the [facility], [facility] or [facility] refill protocol or controlled substance

## 2020-10-05 DIAGNOSIS — M54.42 CHRONIC LEFT-SIDED LOW BACK PAIN WITH LEFT-SIDED SCIATICA: ICD-10-CM

## 2020-10-05 DIAGNOSIS — G89.29 CHRONIC LEFT-SIDED LOW BACK PAIN WITH LEFT-SIDED SCIATICA: ICD-10-CM

## 2020-10-05 RX ORDER — PREGABALIN 150 MG/1
150 CAPSULE ORAL 3 TIMES DAILY
Qty: 270 CAPSULE | Refills: 1 | Status: SHIPPED | OUTPATIENT
Start: 2020-10-05 | End: 2021-04-05

## 2020-10-05 NOTE — TELEPHONE ENCOUNTER
pregabalin (LYRICA) 150 MG capsule      Last Written Prescription Date:  3/24/20  Last Fill Quantity: 270,   # refills: 1  Last Office Visit: 7/29/20  Future Office visit:    Next 5 appointments (look out 90 days)    Oct 29, 2020  8:45 AM  (Arrive by 8:30 AM)  SHORT with Evelyn Olson MD  [facility] ([facility] ) [address] [address]  [phone]           Routing refill request to provider for review/approval because:      PCP: Dr. Olson

## 2020-10-09 DIAGNOSIS — G89.4 CHRONIC PAIN SYNDROME: ICD-10-CM

## 2020-10-09 RX ORDER — HYDROCODONE BITARTRATE AND ACETAMINOPHEN 10; 325 MG/1; MG/1
1 TABLET ORAL EVERY 6 HOURS PRN
Qty: 120 TABLET | Refills: 0 | Status: SHIPPED | OUTPATIENT
Start: 2020-10-09 | End: 2020-11-09

## 2020-10-09 NOTE — TELEPHONE ENCOUNTER
HYDROcodone-acetaminophen (NORCO)  MG per tablet      Last Written Prescription Date:  9/10/20  Last Fill Quantity: 120,   # refills: 0  Last Office Visit: 7/29/20  Future Office visit:    Next 5 appointments (look out 90 days)    Oct 29, 2020  8:45 AM  (Arrive by 8:30 AM)  SHORT with Eveyln Olson MD  [facility] [place] ([facility] ) [address]  [place] [address]  [phone]           Routing refill request to provider for review/approval because:  Drug not on the [facility], [facility] or  Health refill protocol or controlled substance

## 2020-10-27 DIAGNOSIS — E78.5 HYPERLIPIDEMIA, UNSPECIFIED HYPERLIPIDEMIA TYPE: ICD-10-CM

## 2020-10-27 DIAGNOSIS — G89.29 CHRONIC LEFT-SIDED LOW BACK PAIN WITH LEFT-SIDED SCIATICA: ICD-10-CM

## 2020-10-27 DIAGNOSIS — M54.42 CHRONIC LEFT-SIDED LOW BACK PAIN WITH LEFT-SIDED SCIATICA: ICD-10-CM

## 2020-10-27 RX ORDER — DULOXETIN HYDROCHLORIDE 60 MG/1
60 CAPSULE, DELAYED RELEASE ORAL 2 TIMES DAILY
Qty: 180 CAPSULE | Refills: 1 | Status: SHIPPED | OUTPATIENT
Start: 2020-10-27 | End: 2021-04-05

## 2020-10-27 RX ORDER — ATORVASTATIN CALCIUM 80 MG/1
80 TABLET, FILM COATED ORAL DAILY
Qty: 90 TABLET | Refills: 0 | Status: SHIPPED | OUTPATIENT
Start: 2020-10-27 | End: 2020-12-23

## 2020-10-27 NOTE — TELEPHONE ENCOUNTER
atorvastatin (LIPITOR) 80 MG tablet        Last Written Prescription Date:  7/27/20  Last Fill Quantity: 90,   # refills: 0  Last Office Visit: 7/29/20  Future Office visit:    Next 5 appointments (look out 90 days)    Oct 29, 2020  8:45 AM  (Arrive by 8:30 AM)  SHORT with Evelyn [facility] MD  [facility] ) [address]  [place] [address]  [phone]           Routing refill request to provider for review/approval because:    Statins Protocol Failed    LDL on file in past 12 months    LDL Cholesterol Calculated   Date Value Ref Range Status   10/25/2019 54 <100 mg/dL Final     Comment:     Desirable:       <100 mg/dl

## 2020-10-28 NOTE — PROGRESS NOTES
Subjective     Zoran Granado is a 80 year old male who presents to clinic today for the following health issues:    HPI              Chronic Pain Follow-Up    Where in your body do you have pain? Left shoulder, left hip, & low back  How has your pain affected your ability to work? Not applicable  Which of these pain treatments have you tried since your last clinic visit? Other: none  How well are you sleeping? Fair  How has your mood been since your last visit? About the same  Have you had a significant life event? Grief or Loss 3 family members lossed since last april  Other aggravating factors: prolonged sitting  Taking medication as directed? Yes    Prior shoulder surgeries - bilateral - twice left, once right.  Prior knee arthroscopy.  No prior hip surgeries.     No recent imaging.  Prior injections, rhizotomy, pain program completion in [facility].  Prior trigger point injections with PMR. Botox not covered.  Prior chiropractic.Prior back surgery, shoulder surgery.Prior Celebrex, Neurontin wasn't effective,  Elavil with side effects, as well as Effexor and Wellbutrin.    Able to do house projects.  Currently taping and painting.  No falls.  No new injuries.  No side effects of medications.  Bowels moving.  No drowsiness.    PHQ-9 SCORE 7/25/2019 7/29/2020   PHQ-9 Total Score 1 0     ROBYN-7 SCORE 7/25/2019 7/29/2020   Total Score 0 0     No flowsheet data found.  Encounter-Level CSA:    There are no encounter-level csa.     Patient-Level CSA:    Controlled Substance Agreement - Opioid - Scan on 7/26/2019 12:01 PM: OPIOD/OPIOD PLUS CONTROLLED SUBSTANCE AGREEMENT         How many servings of fruits and vegetables do you eat daily?  2-3    On average, how many sweetened beverages do you drink each day (Examples: soda, juice, sweet tea, etc.  Do NOT count diet or artificially sweetened beverages)?   0    How many days per week do you exercise enough to make your heart beat faster? 3 or less    How many minutes a day  "do you exercise enough to make your heart beat faster? 20 - 29    How many days per week do you miss taking your medication? 0     Hyperlipidemia Follow-Up and prediabetes      Are you regularly taking any medication or supplement to lower your cholesterol?   Yes- statin    Are you having muscle aches or other side effects that you think could be caused by your cholesterol lowering medication?  No    Hypertension Follow-up      Do you check your blood pressure regularly outside of the clinic? No     Are you following a low salt diet? No    Are your blood pressures ever more than 140 on the top number (systolic) OR more   than 90 on the bottom number (diastolic), for example 140/90? No     DUE FOR FASTING LABS.      Review of Systems   Constitutional, HEENT, cardiovascular, pulmonary, gi and gu systems are negative, except as otherwise noted.      Objective    /78 (BP Location: Left arm, Patient Position: Sitting, Cuff Size: Adult Regular)   Pulse 67   Temp 96.7  F (35.9  C) (Tympanic)   Ht 1.778 m (5' 10\")   Wt 108 kg (238 lb)   SpO2 95%   BMI 34.15 kg/m    Body mass index is 34.15 kg/m .  Physical Exam   GENERAL: healthy, alert and no distress  EYES: Eyes grossly normal to inspection, PERRL and conjunctivae and sclerae normal  NECK: no adenopathy, no asymmetry, masses, or scars and thyroid normal to palpation  RESP: lungs clear to auscultation - no rales, rhonchi or wheezes  CV: regular rate and rhythm, normal S1 S2, no S3 or S4, no murmur, click or rub, no peripheral edema and peripheral pulses strong  MS: normal muscle tone, gait normal, no ataxia and tender to palpation right paralumbar spine; non-tender left or midline; old surgical scar noted; limited ROM hips, right more than left, internal rotation more limited than external; no gain pain with this; strength 5/5 in all extremities  SKIN: no suspicious lesions or rashes  NEURO: Normal strength and tone, mentation intact and speech normal  PSYCH: " mentation appears normal, affect normal/bright    Labs pending.        Assessment & Plan     Chronic, continuous use of opioids  Stable.    Chronic pain syndrome  Stable.  Contract updated today.  Lab up to date for UDS from 7/2020.  3 month follow up - will do virtual - given increasing covid cases, etc.    Impingement syndrome of left shoulder  Stable.    Degeneration of lumbar or lumbosacral intervertebral disc  Stable.    Degeneration of cervical intervertebral disc  Stable.    Congenital spondylolisthesis  Stable.    Hyperlipidemia, unspecified hyperlipidemia type  Due for fasting labs.  Only had coffee.  - Comprehensive metabolic panel (BMP + Alb, Alk Phos, ALT, AST, Total. Bili, TP)  - Lipid Profile (Chol, Trig, HDL, LDL calc)    Elevated fasting glucose  Due for labs.  - Hemoglobin A1c  - Comprehensive metabolic panel (BMP + Alb, Alk Phos, ALT, AST, Total. Bili, TP)    Gastroesophageal reflux disease, unspecified whether esophagitis present  Controlled.  No active symptoms.  - CBC with platelets and differential    Screening for prostate cancer  Lab today.  - PSA, screen        Patient Instructions   Will call with lab results.  Virtual visit 3 months.        Return in about 3 months (around 1/29/2021) for chronic painl; virtual.    Evelyn Iniguez MD  [facility] [place]

## 2020-10-29 ENCOUNTER — OFFICE VISIT (OUTPATIENT)
Dept: FAMILY MEDICINE | Facility: OTHER | Age: 80
End: 2020-10-29
Attending: FAMILY MEDICINE
Payer: COMMERCIAL

## 2020-10-29 VITALS
HEART RATE: 67 BPM | DIASTOLIC BLOOD PRESSURE: 78 MMHG | BODY MASS INDEX: 34.07 KG/M2 | SYSTOLIC BLOOD PRESSURE: 138 MMHG | WEIGHT: 238 LBS | HEIGHT: 70 IN | OXYGEN SATURATION: 95 % | TEMPERATURE: 96.7 F

## 2020-10-29 DIAGNOSIS — Z12.5 SCREENING FOR PROSTATE CANCER: ICD-10-CM

## 2020-10-29 DIAGNOSIS — R73.01 ELEVATED FASTING GLUCOSE: ICD-10-CM

## 2020-10-29 DIAGNOSIS — G89.4 CHRONIC PAIN SYNDROME: Primary | ICD-10-CM

## 2020-10-29 DIAGNOSIS — Q76.2 CONGENITAL SPONDYLOLISTHESIS: ICD-10-CM

## 2020-10-29 DIAGNOSIS — M75.42 IMPINGEMENT SYNDROME OF LEFT SHOULDER: ICD-10-CM

## 2020-10-29 DIAGNOSIS — M50.30 DEGENERATION OF CERVICAL INTERVERTEBRAL DISC: ICD-10-CM

## 2020-10-29 DIAGNOSIS — F11.90 CHRONIC, CONTINUOUS USE OF OPIOIDS: Chronic | ICD-10-CM

## 2020-10-29 DIAGNOSIS — M51.379 DEGENERATION OF LUMBAR OR LUMBOSACRAL INTERVERTEBRAL DISC: ICD-10-CM

## 2020-10-29 DIAGNOSIS — E78.5 HYPERLIPIDEMIA, UNSPECIFIED HYPERLIPIDEMIA TYPE: ICD-10-CM

## 2020-10-29 DIAGNOSIS — K21.9 GASTROESOPHAGEAL REFLUX DISEASE, UNSPECIFIED WHETHER ESOPHAGITIS PRESENT: ICD-10-CM

## 2020-10-29 LAB
ALBUMIN SERPL-MCNC: 3.7 G/DL (ref 3.4–5)
ALP SERPL-CCNC: 81 U/L (ref 40–150)
ALT SERPL W P-5'-P-CCNC: 26 U/L (ref 0–70)
ANION GAP SERPL CALCULATED.3IONS-SCNC: 5 MMOL/L (ref 3–14)
AST SERPL W P-5'-P-CCNC: 22 U/L (ref 0–45)
BASOPHILS # BLD AUTO: 0.1 10E9/L (ref 0–0.2)
BASOPHILS NFR BLD AUTO: 1.2 %
BILIRUB SERPL-MCNC: 0.4 MG/DL (ref 0.2–1.3)
BUN SERPL-MCNC: 19 MG/DL (ref 7–30)
CALCIUM SERPL-MCNC: 9.1 MG/DL (ref 8.5–10.1)
CHLORIDE SERPL-SCNC: 107 MMOL/L (ref 94–109)
CHOLEST SERPL-MCNC: 151 MG/DL
CO2 SERPL-SCNC: 27 MMOL/L (ref 20–32)
CREAT SERPL-MCNC: 1.05 MG/DL (ref 0.66–1.25)
DIFFERENTIAL METHOD BLD: NORMAL
EOSINOPHIL # BLD AUTO: 0.2 10E9/L (ref 0–0.7)
EOSINOPHIL NFR BLD AUTO: 3.5 %
ERYTHROCYTE [DISTWIDTH] IN BLOOD BY AUTOMATED COUNT: 13.1 % (ref 10–15)
EST. AVERAGE GLUCOSE BLD GHB EST-MCNC: 123 MG/DL
GFR SERPL CREATININE-BSD FRML MDRD: 67 ML/MIN/{1.73_M2}
GLUCOSE SERPL-MCNC: 104 MG/DL (ref 70–99)
HBA1C MFR BLD: 5.9 % (ref 0–5.6)
HCT VFR BLD AUTO: 44.6 % (ref 40–53)
HDLC SERPL-MCNC: 41 MG/DL
HGB BLD-MCNC: 14.9 G/DL (ref 13.3–17.7)
IMM GRANULOCYTES # BLD: 0 10E9/L (ref 0–0.4)
IMM GRANULOCYTES NFR BLD: 0.4 %
LDLC SERPL CALC-MCNC: 56 MG/DL
LYMPHOCYTES # BLD AUTO: 1.8 10E9/L (ref 0.8–5.3)
LYMPHOCYTES NFR BLD AUTO: 32.3 %
MCH RBC QN AUTO: 29.2 PG (ref 26.5–33)
MCHC RBC AUTO-ENTMCNC: 33.4 G/DL (ref 31.5–36.5)
MCV RBC AUTO: 88 FL (ref 78–100)
MONOCYTES # BLD AUTO: 0.6 10E9/L (ref 0–1.3)
MONOCYTES NFR BLD AUTO: 10.7 %
NEUTROPHILS # BLD AUTO: 3 10E9/L (ref 1.6–8.3)
NEUTROPHILS NFR BLD AUTO: 51.9 %
NONHDLC SERPL-MCNC: 110 MG/DL
NRBC # BLD AUTO: 0 10*3/UL
NRBC BLD AUTO-RTO: 0 /100
PLATELET # BLD AUTO: 250 10E9/L (ref 150–450)
POTASSIUM SERPL-SCNC: 4.4 MMOL/L (ref 3.4–5.3)
PROT SERPL-MCNC: 7.5 G/DL (ref 6.8–8.8)
PSA SERPL-ACNC: 2.28 UG/L (ref 0–4)
RBC # BLD AUTO: 5.1 10E12/L (ref 4.4–5.9)
SODIUM SERPL-SCNC: 139 MMOL/L (ref 133–144)
TRIGL SERPL-MCNC: 271 MG/DL
WBC # BLD AUTO: 5.7 10E9/L (ref 4–11)

## 2020-10-29 PROCEDURE — 85025 COMPLETE CBC W/AUTO DIFF WBC: CPT | Mod: ZL | Performed by: FAMILY MEDICINE

## 2020-10-29 PROCEDURE — 999N001182 HC STATISTIC ESTIMATED AVERAGE GLUCOSE: Mod: ZL | Performed by: FAMILY MEDICINE

## 2020-10-29 PROCEDURE — G0103 PSA SCREENING: HCPCS | Mod: ZL | Performed by: FAMILY MEDICINE

## 2020-10-29 PROCEDURE — 99214 OFFICE O/P EST MOD 30 MIN: CPT | Performed by: FAMILY MEDICINE

## 2020-10-29 PROCEDURE — 80053 COMPREHEN METABOLIC PANEL: CPT | Mod: ZL | Performed by: FAMILY MEDICINE

## 2020-10-29 PROCEDURE — 36415 COLL VENOUS BLD VENIPUNCTURE: CPT | Mod: ZL | Performed by: FAMILY MEDICINE

## 2020-10-29 PROCEDURE — G0463 HOSPITAL OUTPT CLINIC VISIT: HCPCS

## 2020-10-29 PROCEDURE — 83036 HEMOGLOBIN GLYCOSYLATED A1C: CPT | Mod: ZL | Performed by: FAMILY MEDICINE

## 2020-10-29 PROCEDURE — 80061 LIPID PANEL: CPT | Mod: ZL | Performed by: FAMILY MEDICINE

## 2020-10-29 ASSESSMENT — MIFFLIN-ST. JEOR: SCORE: 1805.81

## 2020-10-29 ASSESSMENT — PAIN SCALES - GENERAL: PAINLEVEL: EXTREME PAIN (8)

## 2020-10-29 NOTE — NURSING NOTE
"Chief Complaint   Patient presents with     Pain     chronic       Initial /78 (BP Location: Left arm, Patient Position: Sitting, Cuff Size: Adult Regular)   Pulse 67   Temp 96.7  F (35.9  C) (Tympanic)   Ht 1.778 m (5' 10\")   Wt 108 kg (238 lb)   SpO2 95%   BMI 34.15 kg/m   Estimated body mass index is 34.15 kg/m  as calculated from the following:    Height as of this encounter: 1.778 m (5' 10\").    Weight as of this encounter: 108 kg (238 lb).  Medication Reconciliation: complete  Erica Cox LPN  "

## 2020-11-09 ENCOUNTER — TELEPHONE (OUTPATIENT)
Dept: FAMILY MEDICINE | Facility: OTHER | Age: 80
End: 2020-11-09

## 2020-11-09 ENCOUNTER — OFFICE VISIT (OUTPATIENT)
Dept: FAMILY MEDICINE | Facility: OTHER | Age: 80
End: 2020-11-09
Attending: FAMILY MEDICINE
Payer: COMMERCIAL

## 2020-11-09 VITALS
BODY MASS INDEX: 34.36 KG/M2 | SYSTOLIC BLOOD PRESSURE: 162 MMHG | DIASTOLIC BLOOD PRESSURE: 88 MMHG | OXYGEN SATURATION: 96 % | HEIGHT: 70 IN | WEIGHT: 240 LBS | HEART RATE: 72 BPM

## 2020-11-09 DIAGNOSIS — G89.4 CHRONIC PAIN SYNDROME: ICD-10-CM

## 2020-11-09 DIAGNOSIS — M51.379 DEGENERATION OF LUMBAR OR LUMBOSACRAL INTERVERTEBRAL DISC: ICD-10-CM

## 2020-11-09 DIAGNOSIS — M54.16 ACUTE LUMBAR RADICULOPATHY: Primary | ICD-10-CM

## 2020-11-09 PROCEDURE — G0463 HOSPITAL OUTPT CLINIC VISIT: HCPCS | Mod: 25

## 2020-11-09 PROCEDURE — 99214 OFFICE O/P EST MOD 30 MIN: CPT | Performed by: FAMILY MEDICINE

## 2020-11-09 RX ORDER — HYDROCODONE BITARTRATE AND ACETAMINOPHEN 10; 325 MG/1; MG/1
1 TABLET ORAL EVERY 4 HOURS PRN
Qty: 150 TABLET | Refills: 0 | Status: SHIPPED | OUTPATIENT
Start: 2020-11-09 | End: 2020-12-07

## 2020-11-09 RX ORDER — METHYLPREDNISOLONE 4 MG
TABLET, DOSE PACK ORAL
Qty: 21 TABLET | Refills: 0 | Status: SHIPPED | OUTPATIENT
Start: 2020-11-09 | End: 2020-12-11

## 2020-11-09 ASSESSMENT — MIFFLIN-ST. JEOR: SCORE: 1814.88

## 2020-11-09 ASSESSMENT — PAIN SCALES - GENERAL: PAINLEVEL: WORST PAIN (10)

## 2020-11-09 NOTE — TELEPHONE ENCOUNTER
Can work in at 3:15 today.  Let them know might be a bit of a wait, but please check in at 3 for 3:15.

## 2020-11-09 NOTE — PROGRESS NOTES
Subjective     Zoran Granado is a 80 year old male who presents to clinic today for the following health issues:    HPI         Back Pain  Onset/Duration: 3 days - pain and numbness/tingling  Description:   Location of pain: low back right, right hip  Character of pain: stabbing and constant  Pain radiation: radiates into the right buttocks and radiates into the right leg to just below knee, sometimes to toes  New numbness or weakness in legs, not attributed to pain: YES- weakness in right leg; no foot drop  Intensity: Currently 10/10, At its worst 10/10  Progression of Symptoms: worsening  History:   Specific cause: none  Pain interferes with job: not applicable  History of back problems: recurrent self limited episodes of low back pain in the past  Any previous MRI or X-rays: None  Sees a specialist for back pain: No  Alleviating factors:   Improved by: none    Precipitating factors:  Worsened by: Lifting, Bending, Standing and Walking  Therapies tried and outcome: cold, heat, opioids and rest  User walker and cane now.  Just woke up one morning with flare, Saturday.  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    No fever.  No blood in urine/stool.  No groin symptoms.  No changes in bowel/bladder.    Fusion L5/S1 2001 or so.    Cymbalta 60 mg BID.   Norco 10/325 QID.  Lyrica 150 mg TID.    Added extra Tylenol with this flare and Ibuprofen.    No recent imaging.  Prior injections, rhizotomy, pain program completion in [facility].  Prior trigger point injections with PMR. Botox not covered.  Prior chiropractic.Prior back surgery, shoulder surgery.Prior Celebrex, Neurontin wasn't effective,  Elavil with side effects, as well as Effexor and Wellbutrin.    Last MRI 2015 [facility]  IMPRESSION:  1. Mild to moderate multilevel degenerative lumbar spondylosis without severe stenosis or neural impingement.  2. Small broad-based L4-L5 central disc protrusion superimposed upon mild diffuse disc  "bulging. No neural impingement.    Review of Systems   Constitutional, HEENT, cardiovascular, pulmonary, gi and gu systems are negative, except as otherwise noted.      Objective    BP (!) 162/88 (Patient Position: Sitting)   Pulse 72   Ht 1.778 m (5' 10\")   Wt 108.9 kg (240 lb)   SpO2 96%   BMI 34.44 kg/m    Body mass index is 34.44 kg/m .  Physical Exam   GENERAL: alert, no distress and over weight  NECK: no adenopathy, no asymmetry, masses, or scars and thyroid normal to palpation  RESP: lungs clear to auscultation - no rales, rhonchi or wheezes  CV: regular rate and rhythm, normal S1 S2, no S3 or S4, no murmur, click or rub, no peripheral edema and peripheral pulses strong  MS: normal muscle tone, peripheral pulses normal and tenderness to palpation lumbar spine, paraspinal, right buttock; SLR positive right, negative left; able to toe/heel raise if holding on for support.  Difficulty getting up out of chair - placing weight on right leg. Reflexes symmetric at achilles and patella.   SKIN: no suspicious lesions or rashes  NEURO: Normal strength and tone, sensory exam grossly normal and mentation intact  PSYCH: mentation appears normal, affect normal/bright            Assessment & Plan     Acute lumbar radiculopathy  Right sided, to calf. Suspect L3/4 disc/impingement.  - methylPREDNISolone (MEDROL DOSEPAK) 4 MG tablet therapy pack; Follow Package Directions  - MR Lumbar Spine w/o Contrast; Future    Chronic pain syndrome  - HYDROcodone-acetaminophen (NORCO)  MG per tablet; Take 1 tablet by mouth every 4 hours as needed for pain    Degeneration of lumbar or lumbosacral intervertebral disc    - MR Lumbar Spine w/o Contrast; Future        Patient Instructions   Complete steroid taper as instructed.  Ok to use extra dose of Lortab daily for time being.  Script sent with 150 rather than 120.  MRI ordered.  If acutely progressing - new weakness, groin numbness, change in bowel/bladder, etc - present to " FLIP.          Evelyn Iniguez MD  [facility] - [place]

## 2020-11-09 NOTE — TELEPHONE ENCOUNTER
"8:14 AM    Reason for Call: OVERBOOK    Patient is having the following symptoms: back pain, \"tweeked over weekend\"    The patient is requesting an appointment for today with Dr. Olson.    Was an appointment offered for this call? No  If yes : Appointment type              Date    Preferred method for responding to this message: Telephone Call     What is your phone number [phone]    If we cannot reach you directly, may we leave a detailed response at the number you provided? Yes    Can this message wait until your PCP/provider returns, if unavailable today? Not applicable, provider is in clinic    Katerina Moffett    "

## 2020-11-09 NOTE — NURSING NOTE
"Chief Complaint   Patient presents with     Musculoskeletal Problem       Initial BP (!) 162/88 (Patient Position: Sitting)   Pulse 72   Ht 1.778 m (5' 10\")   Wt 108.9 kg (240 lb)   SpO2 96%   BMI 34.44 kg/m   Estimated body mass index is 34.44 kg/m  as calculated from the following:    Height as of this encounter: 1.778 m (5' 10\").    Weight as of this encounter: 108.9 kg (240 lb).  Medication Reconciliation: complete  Suze Villasenor LPN  "

## 2020-11-09 NOTE — PATIENT INSTRUCTIONS
Complete steroid taper as instructed.  Ok to use extra dose of Lortab daily for time being.  Script sent with 150 rather than 120.  MRI ordered.  If acutely progressing - new weakness, groin numbness, change in bowel/bladder, etc - present to ER.

## 2020-11-10 ENCOUNTER — HOSPITAL ENCOUNTER (OUTPATIENT)
Dept: MRI IMAGING | Facility: HOSPITAL | Age: 80
Discharge: HOME | End: 2020-11-10
Attending: FAMILY MEDICINE | Admitting: FAMILY MEDICINE
Payer: COMMERCIAL

## 2020-11-10 DIAGNOSIS — M54.16 ACUTE LUMBAR RADICULOPATHY: ICD-10-CM

## 2020-11-10 DIAGNOSIS — M51.379 DEGENERATION OF LUMBAR OR LUMBOSACRAL INTERVERTEBRAL DISC: ICD-10-CM

## 2020-11-10 PROCEDURE — 72148 MRI LUMBAR SPINE W/O DYE: CPT

## 2020-11-11 ENCOUNTER — TELEPHONE (OUTPATIENT)
Dept: FAMILY MEDICINE | Facility: OTHER | Age: 80
End: 2020-11-11

## 2020-11-11 DIAGNOSIS — M51.379 DEGENERATION OF LUMBAR OR LUMBOSACRAL INTERVERTEBRAL DISC: Primary | ICD-10-CM

## 2020-11-11 NOTE — TELEPHONE ENCOUNTER
Pt notified and agreed to the injection. I believe I pended the proper order. Please advise. Erica Cox LPN

## 2020-11-25 ENCOUNTER — TELEPHONE (OUTPATIENT)
Dept: INTERVENTIONAL RADIOLOGY/VASCULAR | Facility: HOSPITAL | Age: 80
End: 2020-11-25

## 2020-11-25 NOTE — TELEPHONE ENCOUNTER
Patient aware not to have a flu shot/immunization two weeks before or after injection. Patient also informed not to come in for injection if awaiting a pending covid test result or experiencing any covid symptoms.

## 2020-12-01 ENCOUNTER — HOSPITAL ENCOUNTER (OUTPATIENT)
Dept: INTERVENTIONAL RADIOLOGY/VASCULAR | Facility: HOSPITAL | Age: 80
End: 2020-12-01
Attending: FAMILY MEDICINE | Admitting: RADIOLOGY
Payer: COMMERCIAL

## 2020-12-01 ENCOUNTER — HOSPITAL ENCOUNTER (OUTPATIENT)
Facility: HOSPITAL | Age: 80
Discharge: HOME | End: 2020-12-01
Attending: RADIOLOGY | Admitting: RADIOLOGY
Payer: COMMERCIAL

## 2020-12-01 DIAGNOSIS — M51.379 DEGENERATION OF LUMBAR OR LUMBOSACRAL INTERVERTEBRAL DISC: ICD-10-CM

## 2020-12-01 PROCEDURE — 250N000011 HC RX IP 250 OP 636: Performed by: RADIOLOGY

## 2020-12-01 PROCEDURE — C1751 CATH, INF, PER/CENT/MIDLINE: HCPCS

## 2020-12-01 RX ORDER — IOPAMIDOL 612 MG/ML
15 INJECTION, SOLUTION INTRATHECAL ONCE
Status: COMPLETED | OUTPATIENT
Start: 2020-12-01 | End: 2020-12-01

## 2020-12-01 RX ORDER — IOPAMIDOL 612 MG/ML
50 INJECTION, SOLUTION INTRAVASCULAR ONCE
Status: DISCONTINUED | OUTPATIENT
Start: 2020-12-01 | End: 2020-12-01 | Stop reason: CLARIF

## 2020-12-01 RX ORDER — METHYLPREDNISOLONE ACETATE 80 MG/ML
INJECTION, SUSPENSION INTRA-ARTICULAR; INTRALESIONAL; INTRAMUSCULAR; SOFT TISSUE
Status: DISPENSED
Start: 2020-12-01 | End: 2020-12-01

## 2020-12-01 RX ORDER — METHYLPREDNISOLONE ACETATE 80 MG/ML
80 INJECTION, SUSPENSION INTRA-ARTICULAR; INTRALESIONAL; INTRAMUSCULAR; SOFT TISSUE ONCE
Status: COMPLETED | OUTPATIENT
Start: 2020-12-01 | End: 2020-12-01

## 2020-12-01 RX ADMIN — METHYLPREDNISOLONE ACETATE 80 MG: 80 INJECTION, SUSPENSION INTRA-ARTICULAR; INTRALESIONAL; INTRAMUSCULAR; SOFT TISSUE at 10:04

## 2020-12-01 RX ADMIN — IOPAMIDOL 6 ML: 612 INJECTION, SOLUTION INTRATHECAL at 10:06

## 2020-12-01 NOTE — DISCHARGE INSTRUCTIONS
Cell number on file:    Telephone Information:   Mobile [phone]     Is it ok to leave a message at this number(s)? Yes    Dr. Echols completed your procedure on 12/1/2020.    Current Pain Level (0-10 Scale): 9/10  Post Pain Level (0-10):  9/10    Radiology Discharge instructions for Steroid Injection    Activity Level:     Do not do any heavy activity or exercise for 24 hours.   Do not drive for 4 hours after your injection.  Diet:   Return to your normal diet.  Medications:   If you have stopped taking your Aspirin, Coumadin/Warfarin, Ibuprofen, or any   other blood thinner for this procedure you may resume in the morning unless   your primary care provider has given you other instructions.    Diabetics may see an increase in blood sugar after steroid injections. If you are concerned about your blood sugar, please contact your family doctor.    Site Care:  Remove the bandage and bathe or shower the morning after the procedure.      Please allow two weeks to experience improvement in your pain.  If you have any further issues, please contact your provider.    Call your Primary Care Provider if you have the following (if your primary care provider is not available please seek emergency care):   Nausea with vomiting   Severe headache   Drowsiness or confusion   Redness or drainage at the injection or puncture site   Temperature over 101 degrees F   Other concerns   Worsening back pain   Stiff neck

## 2020-12-01 NOTE — IP AVS SNAPSHOT
[facility]  [address] [address]  Phone: [phone]  Fax: [phone]                                    After Visit Summary   12/1/2020    Zoran Granado    MRN: [id number]           After Visit Summary Signature Page    I have received my discharge instructions, and my questions have been answered. I have discussed any challenges I see with this plan with the nurse or doctor.    ..........................................................................................................................................  Patient/Patient Representative Signature      ..........................................................................................................................................  Patient Representative Print Name and Relationship to Patient    ..................................................               ................................................  Date                                   Time    ..........................................................................................................................................  Reviewed by Signature/Title    ...................................................              ..............................................  Date                                               Time          22EPIC Rev 08/18

## 2020-12-07 DIAGNOSIS — G89.4 CHRONIC PAIN SYNDROME: ICD-10-CM

## 2020-12-07 RX ORDER — HYDROCODONE BITARTRATE AND ACETAMINOPHEN 10; 325 MG/1; MG/1
1 TABLET ORAL EVERY 4 HOURS PRN
Qty: 150 TABLET | Refills: 0 | Status: SHIPPED | OUTPATIENT
Start: 2020-12-07 | End: 2020-12-21

## 2020-12-07 NOTE — TELEPHONE ENCOUNTER
norco       Last Written Prescription Date:  11-9-20  Last Fill Quantity: 150,   # refills: 0  Last Office Visit: 11-9-20  Future Office visit:

## 2020-12-11 ENCOUNTER — TELEPHONE (OUTPATIENT)
Dept: FAMILY MEDICINE | Facility: OTHER | Age: 80
End: 2020-12-11

## 2020-12-11 DIAGNOSIS — M54.16 ACUTE LUMBAR RADICULOPATHY: ICD-10-CM

## 2020-12-11 RX ORDER — METHYLPREDNISOLONE 4 MG
TABLET, DOSE PACK ORAL
Qty: 21 TABLET | Refills: 0 | Status: SHIPPED | OUTPATIENT
Start: 2020-12-11 | End: 2021-01-13

## 2020-12-11 NOTE — TELEPHONE ENCOUNTER
Patients wife called. Patients pain medication is not adequate and she is requesting something stronger. Patient is scheduled with the [facility] on Monday 12/14.

## 2020-12-11 NOTE — TELEPHONE ENCOUNTER
Can try Norco 2 every 6 hrs and  Repeat medrol dose pack- went to [facility].  That is strong as it gets about.  Ice

## 2020-12-11 NOTE — TELEPHONE ENCOUNTER
Pt's wife called back regarding below. Reports that pt can't sleep due to pain. Pt does have appt with [facility] on Monday 12/14. Pt is currently prescribed norco 10/325mg every 4 hours PRN. Wife is wondering if pt could be prescribed something stronger. Please advise. Thank you!

## 2020-12-14 ENCOUNTER — TRANSFERRED RECORDS (OUTPATIENT)
Dept: HEALTH INFORMATION MANAGEMENT | Facility: CLINIC | Age: 80
End: 2020-12-14

## 2020-12-15 ENCOUNTER — TELEPHONE (OUTPATIENT)
Dept: INTERVENTIONAL RADIOLOGY/VASCULAR | Facility: HOSPITAL | Age: 80
End: 2020-12-15

## 2020-12-15 NOTE — TELEPHONE ENCOUNTER
INJECTION POST CALL    Procedure: Epidural TL L3-4   Radiologist(s): Dr. Nathanael Echols  Date of Procedure:  12/1/20    Relief of pain from this injection    A = 90%  A- = 85%  B = 80%  B- =75%  C = 70%  C- = 65%  D = 60%  D- = 50%  F < 50%    Do you feel this injection was beneficial? No  If yes, how long did it last? Patient states he had 0% relief, never helped    Where is the pain located? Right buttock to the inside of the right thigh, to the calf, to the big toe and bottom of the right foot.  Can you describe the pain? Numbness in big toe and bottom of foot    Does the pain radiate anywhere?yes  If yes, where does the pain stop? Bottom of right foot    Is this new pain? No      I responded to the patient's questions/concerns. [facility] and is put on a 20 day steroid. Informed patient the radiologist did recommend and GINA Right Transforaminal L5-S1 if first injection did not help    Instruct patient to follow up with their provider for any further care they may need. (as Dr. Echols will no longer be making recommendations)    Richelle Curry

## 2020-12-21 ENCOUNTER — TELEPHONE (OUTPATIENT)
Dept: FAMILY MEDICINE | Facility: OTHER | Age: 80
End: 2020-12-21

## 2020-12-21 DIAGNOSIS — G89.4 CHRONIC PAIN SYNDROME: ICD-10-CM

## 2020-12-21 RX ORDER — HYDROCODONE BITARTRATE AND ACETAMINOPHEN 10; 325 MG/1; MG/1
1-2 TABLET ORAL 4 TIMES DAILY PRN
Qty: 240 TABLET | Refills: 0 | Status: SHIPPED | OUTPATIENT
Start: 2020-12-21 | End: 2021-01-29

## 2020-12-21 NOTE — TELEPHONE ENCOUNTER
Noted.  Refill done to reflect 2 tabs every 6 hours as needed.  To be reviewed after surgery in 1/2021.

## 2020-12-21 NOTE — TELEPHONE ENCOUNTER
"Pt's wife called regarding [place] refill. Pt's wife stated \"Dr. Hart said he can take 2 tabs every 6 hours and I wanted to see what Dr. Olson wanted to do\".  \"I believe he as enough pain meds for this week, but he'd like to continue on this dose\"  Surgery scheduled January 19, 2021 at the [facility] per pt's wife report. Requesting PCP to advise on plan.   Call back number: [phone]  "

## 2020-12-23 DIAGNOSIS — E78.5 HYPERLIPIDEMIA, UNSPECIFIED HYPERLIPIDEMIA TYPE: ICD-10-CM

## 2020-12-23 RX ORDER — ATORVASTATIN CALCIUM 80 MG/1
TABLET, FILM COATED ORAL
Qty: 90 TABLET | Refills: 0 | Status: SHIPPED | OUTPATIENT
Start: 2020-12-23 | End: 2021-04-05

## 2020-12-30 ENCOUNTER — TELEPHONE (OUTPATIENT)
Dept: FAMILY MEDICINE | Facility: OTHER | Age: 80
End: 2020-12-30

## 2020-12-30 NOTE — TELEPHONE ENCOUNTER
Prior Authorization Retail Medication Request  [facility] KEY# [id number]    Medication/Dose: HYDROCODONE-ACETAMINOPHEN 10-325MG TABLETS  ICD code (if different than what is on RX):    Previously Tried and Failed:    Rationale:      Insurance Name:    Insurance ID:        Pharmacy Information (if different than what is on RX)  Name:    Phone:

## 2020-12-30 NOTE — TELEPHONE ENCOUNTER
Prior Authorization Approval    Authorization Effective Date: 1/1/2020  Authorization Expiration Date: 12/31/2020  Medication: HYDROCODONE-ACETAMINOPHEN 10-325MG TABLETS  Approved Dose/Quantity:   Reference #: [id number]   Insurance Company: [employer] - Phone [phone] Fax [phone]  Expected CoPay:       CoPay Card Available:      Foundation Assistance Needed:    Which Pharmacy is filling the prescription (Not needed for infusion/clinic administered): [facility] [address] [facility] [id number] [facility] [address]  Pharmacy Notified: Yes  Patient Notified: Yes, **Instructed pharmacy to notify patient when script is ready to /ship.**

## 2020-12-30 NOTE — TELEPHONE ENCOUNTER
PA Initiation    Medication: HYDROCODONE-ACETAMINOPHEN 10-325MG TABLETS  Insurance Company: [employer] - Phone [phone] Fax [phone]  Pharmacy Filling the Rx: [facility] [address] [place] [id number] [facility] [address]  Filling Pharmacy Phone: [phone]  Filling Pharmacy Fax: [phone]  Start Date: 12/30/2020

## 2021-01-12 NOTE — PATIENT INSTRUCTIONS
"Abnormal EKG - new left bundle branch block.  Will need stress test prior to surgery.    Start Lisinopril 10 mg daily.    Preparing for Your Surgery  Getting started  A surgery nurse will call you to review your health history and instructions. They will give you an arrival time based on your scheduled surgery time.  Please be ready to share the following:    Your doctor's clinic name and phone number    Your medical, surgical and anesthesia history    A list of allergies and sensitivities    A list of medicines, including herbal treatments and over-the-counter drugs    Whether the patient has a legal guardian (ask how to send us the papers in advance)  If your child is having surgery, please ask for a copy of Preparing for Your Child's Surgery.    Preparing for surgery    Within 30 days of surgery: Have an exam at your family clinic (primary care clinic), or go to a pre-operative clinic. This exam is called a \"History and Physical,\" or H&P.    At your H&P exam, talk to your care team about all medicines you take. If you need to stop any medicines before surgery, ask when to start taking them again.  ? We do this for your safety. Many medicines can make you bleed too much during surgery. Some change how well surgery (anesthesia) drugs work.    Call your insurance company to see what it will and won't pay for. Ask if they need to pre-approve the surgery. (If no insurance, call [phone].)    Call your surgeon's clinic if there's any change in your health. This includes signs of a cold or flu (sore throat, runny nose, cough, rash, fever). It also includes a scrape or scratch near the surgery site.    If you have questions on the day of surgery, call your surgery center.  Eating and drinking guidelines  For your safety: Unless your surgeon tells you otherwise, follow the guidelines below.    Eat and drink as usual until 8 hours before surgery. After that, no food or milk.    Drink clear liquids until 2 hours before " surgery. These are liquids you can see through, like water, Gatorade and Propel Water. You may also have black coffee and tea (no cream or milk).    Nothing by mouth within 2 hours of surgery. This includes gum, candy and breath mints.    Stop alcohol the midnight before surgery.    If your family clinic tells you to take medicine on the morning of surgery, it's okay to take it with a sip of water.  Preventing infection    Shower or bathe the night before and morning of your surgery. Follow the instructions your clinic gave you. (If no instructions, use regular soap.)    Don't shave or clip hair near your surgery site. This can lead to skin infection.    Don't smoke the morning of surgery. Smoking increases the risk of infection. You may chew nicotine gum up to 2 hours before surgery. A nicotine patch is okay.  ? Note: Some surgeries require you to completely quit smoking and nicotine. Check with your surgeon.    Your care team will make every effort to keep you safe from infection. We will:  ? Clean our hands often with soap and water (or an alcohol-based hand rub).  ? Clean the skin at your surgery site with a special soap that kills germs. We'll also remove hair from the site as needed.  ? Wear special hair covers, masks, gowns and gloves during surgery.  ? Give antibiotic medicine, if prescribed. Not all surgeries need antibiotics.  What to bring on the day of surgery    Photo ID and insurance card    Copy of your health care directive, if you have one    Glasses and hearing aides (bring cases)  ? You can't wear contacts during surgery    Inhaler and eye drops, if you use them (tell us about these when you arrive)    CPAP machine or breathing device, if you use them    A few personal items, if spending the night    If you have . . .  ? A pacemaker or ICD (cardiac defibrillator): Bring the ID card.  ? An implanted stimulator: Bring the remote control.  ? A legal guardian: Bring a copy of the certified  (court-stamped) guardianship papers.  Please remove any jewelry, including body piercings. Leave jewelry and other valuables at home.  If you're going home the day of surgery  Important: If you don't follow the rules below, we must cancel your surgery.     Arrange for someone to drive you home after surgery. You may not drive, take a taxi or take public transportation by yourself (unless you'll have local anesthesia only).    Arrange for a responsible adult to stay with you overnight. If you don't, we may keep you in the hospital overnight, and you may need to pay the costs yourself.  Questions?   If you have any questions for your care team, list them here: _________________________________________________________________________________________________________________________________________________________________________________________________________________________________________________________________________________________________________________________  For informational purposes only. Not to replace the advice of your health care provider. Copyright   1273-8313 [employer]. All rights reserved. Clinically reviewed by Kasandra Gross MD. [employer] [id number] - REV 07/19.

## 2021-01-12 NOTE — PROGRESS NOTES
[facility] [place]  [address]  [place] [address]  Phone: [phone]  Primary Provider: Evelyn Olson  Pre-op Performing Provider: EVELYN OLSON    PREOPERATIVE EVALUATION:  Today's date: 1/13/2021    Zoran Granado is a 81 year old male who presents for a preoperative evaluation.    Surgical Information:  Surgery/Procedure: posterior spine fusion with Instrumentation L4-L5  Surgery Location: [facility]  Surgeon: Dr.Amir Brown  Surgery Date: 1/19/21  Time of Surgery: unknown  Where patient plans to recover: At home with family   Fax number for surgical facility: [phone]    Type of Anesthesia Anticipated: to be determined    Subjective     HPI related to upcoming procedure: Patient with HNP with radiculopathy, lumbar.    Symptoms persist despite therapy and injection.    Surgeon requesting EKG, INR, BMP.    BP up today. No prior HTN treatment. May be pain related.      Preop Questions 1/13/2021   1. Have you ever had a heart attack or stroke? No   2. Have you ever had surgery on your heart or blood vessels, such as a stent placement, a coronary artery bypass, or surgery on an artery in your head, neck, heart, or legs? No   3. Do you have chest pain with activity? No   4. Do you have a history of  heart failure? No   5. Do you currently have a cold, bronchitis or symptoms of other infection? No   6. Do you have a cough, shortness of breath, or wheezing? No   7. Do you or anyone in your family have previous history of blood clots? No   8. Do you or does anyone in your family have a serious bleeding problem such as prolonged bleeding following surgeries or cuts? No   9. Have you ever had problems with anemia or been told to take iron pills? No   10. Have you had any abnormal blood loss such as black, tarry or bloody stools? No   11. Have you ever had a blood transfusion? No   12. Are you willing to have a blood transfusion if it is medically needed before, during, or after  your surgery? Yes   13. Have you or any of your relatives ever had problems with anesthesia? No   14. Do you have sleep apnea, excessive snoring or daytime drowsiness? YES - snoring; prior sleep study; fitted with CPAP -does not wear it - uncomfortable/fit   14a. Do you have a CPAP machine? No   15. Do you have any artifical heart valves or other implanted medical devices like a pacemaker, defibrillator, or continuous glucose monitor? No   16. Do you have artificial joints? No   17. Are you allergic to latex? No     Health Care Directive:  Patient has a Health Care Directive on file      Preoperative Review of :   reviewed - controlled substances reflected in medication list.      Status of Chronic Conditions:  See problem list for active medical problems.  Problems all longstanding and stable, except as noted/documented.  See ROS for pertinent symptoms related to these conditions.    HYPERLIPIDEMIA - Patient has a long history of significant Hyperlipidemia requiring medication for treatment with recent good control. Patient reports no problems or side effects with the medication.     SLEEP PROBLEM - Patient has a longstanding history of sleep apnea - with prior sleep study, but noncompliant with CPAP.    Prediabetes -   Lab Results   Component Value Date    A1C 5.9 10/29/2020    A1C 6.0 10/25/2019         Review of Systems  Constitutional, neuro, ENT, endocrine, pulmonary, cardiac, gastrointestinal, genitourinary, musculoskeletal, integument and psychiatric systems are negative, except as otherwise noted.    Patient Active Problem List    Diagnosis Date Noted     Obesity (BMI 30.0-34.9) 02/14/2020     Priority: Medium     Obesity (BMI 35.0-39.9) with comorbidity (H) 10/25/2019     Priority: Medium     Chronic pain syndrome 07/25/2019     Priority: Medium     Hyperlipidemia, unspecified hyperlipidemia type 07/25/2019     Priority: Medium     Chronic, continuous use of opioids 07/25/2019     Priority: Medium      Patient is followed by Evelyn Iniguez MD for ongoing prescription of pain medication.  All refills should only be approved by this provider, or covering partner.    Medication(s): Norco 10/325mg.   Maximum quantity per month: #95  Clinic visit frequency required: Q 3 months      Controlled substance agreement:  Encounter-Level CSA:    There are no encounter-level csa.     Patient-Level CSA:    Controlled Substance Agreement - Opioid - Scan on 7/26/2019 12:01 PM: OPIOD/OPIOD PLUS CONTROLLED SUBSTANCE AGREEMENT (below)         Pain Clinic evaluation in the past: No    DIRE Total Score(s):  No flowsheet data found.    Last [facility] website verification:  done on 7.25.19.   [email or web address]         Acute pain of left shoulder 10/04/2018     Priority: Medium     Muscle weakness 10/04/2018     Priority: Medium     Shoulder stiffness, left 10/04/2018     Priority: Medium     Long term (current) use of opiate analgesic 08/09/2018     Priority: Medium     Opioid Agreement Date: 04/17/2012  Last UDT (Urine Drug Test) on date:  none  Pain Dx: Low back pain  Last Pain Visit: 09/30/10  Preferred Pharmacy: [facility]  Comments: Future appointment 11/03/10       Impingement syndrome of left shoulder 08/03/2018     Priority: Medium     Class 1 obesity without serious comorbidity with body mass index (BMI) of 33.0 to 33.9 in adult, unspecified obesity type 06/12/2018     Priority: Medium     Advance Care Planning 08/22/2017     Priority: Medium     Multiple lipomas 09/05/2013     Priority: Medium     Elevated fasting glucose 04/14/2011     Priority: Medium     IMO Update 10/11       Cervicalgia 04/07/2010     Priority: Medium     IMO Update 10/11       HCD (health care directive) 10/08/2009     Priority: Medium     IMO Regulatory Load OCT 2020       Adjustment disorder with depressed mood 08/23/2007     Priority: Medium     Degeneration of cervical intervertebral disc 08/23/2007     Priority: Medium     IMO  Update 10/11       Degeneration of lumbar or lumbosacral intervertebral disc 08/23/2007     Priority: Medium     IMO Update 10/11       Thoracic and lumbosacral neuritis 08/23/2007     Priority: Medium     IMO Update 10/11       Lumbago 04/23/2007     Priority: Medium     IMO Update 10/11       Sciatica 03/28/2006     Priority: Medium     Left       Hyperlipidemia 03/28/2006     Priority: Medium     Dyslipidemia  IMO Update 10/11       Esophageal reflux 08/31/2004     Priority: Medium     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 Degenerative.   IMO Update 10/11        Past Medical History:   Diagnosis Date     Cataract     both eyes     Chronic back pain      DDD (degenerative disc disease), cervical      DDD (degenerative disc disease), lumbar      GERD (gastroesophageal reflux disease)      Hyperlipidemia      Lumbar radiculopathy     Dr. Brown;  Spine; plan revision decompression and fusion L4/5     Macular degeneration     receives injections; Dr. Taveras in [place]     Prediabetes      Past Surgical History:   Procedure Laterality Date     APPENDECTOMY       ARTHROSCOPY KNEE BILATERAL       BACK SURGERY  2000    lumbar L5/S1     CATARACT IOL, RT/LT Bilateral      COLONOSCOPY       COLONOSCOPY - HIM SCAN  09/09/2004    Colonoscopy & Polypectomy - a small polyp.  Path - benign colonic mucosa     disc replacement  2001    lumbar; single level L5/S1; Dr Cage - [facility] ECP WITH CATARACT SURGERY  2012    both eyes     NISSEN FUNDOPLICATION       SHOULDER SURGERY      Bilateral; twice left, once right; arthroscopies     TONSILLECTOMY       Current Outpatient Medications   Medication Sig Dispense Refill     Acetaminophen 325 MG CAPS Take 325 mg by mouth 3 times daily       aspirin 81 MG tablet Take by mouth daily       atorvastatin (LIPITOR) 80 MG tablet TAKE 1 TABLET DAILY 90 tablet 0     calcium carbonate-vitamin D 600-200 MG-UNIT CAPS Take by mouth 2 times daily         diphenhydrAMINE-acetaminophen (TYLENOL PM EXTRA STRENGTH)  MG tablet Take 1 tablet by mouth nightly as needed for sleep       DULoxetine (CYMBALTA) 60 MG capsule Take 1 capsule (60 mg) by mouth 2 times daily 180 capsule 1     HYDROcodone-acetaminophen (NORCO)  MG per tablet Take 1-2 tablets by mouth 4 times daily as needed for pain 240 tablet 0     methylPREDNISolone (MEDROL DOSEPAK) 4 MG tablet therapy pack Follow Package Directions 21 tablet 0     Multiple Vitamins-Minerals (PRESERVISION AREDS 2 PO) Take 1 capsule by mouth 2 times daily       polyethylene glycol (MIRALAX) powder Take by mouth daily Take 17 g by mouth one time daily.  Mix in 8oz of water, juice,soda, coffee,or tea prior to administration       pregabalin (LYRICA) 150 MG capsule Take 1 capsule (150 mg) by mouth 3 times daily 270 capsule 1       Allergies   Allergen Reactions     Seasonal Allergies         Social History     Tobacco Use     Smoking status: Former Smoker     Types: Cigarettes     Quit date: 1992     Years since quittin.0     Smokeless tobacco: Never Used   Substance Use Topics     Alcohol use: Never     Frequency: Never     Family History   Problem Relation Age of Onset     Diabetes Mother      Heart Disease Mother      Heart Disease Father      Lipids Sister         5 sisters     Lipids Brother         2 brothers     Diabetes Brother      Hypertension Brother      Breast Cancer Sister         breast     History   Drug Use No         Objective     There were no vitals taken for this visit.    Physical Exam    GENERAL APPEARANCE: alert, no distress, cooperative and over weight     EYES: EOMI,  PERRL     HENT: ear canals and TM's normal and nose and mouth without ulcers or lesions     NECK: no adenopathy, no asymmetry, masses, or scars and thyroid normal to palpation     RESP: lungs clear to auscultation - no rales, rhonchi or wheezes     CV: regular rates and rhythm, normal S1 S2, no S3 or S4 and no murmur,  click or rub     ABDOMEN:  soft, nontender, no HSM or masses and bowel sounds normal     MS: extremities normal- no gross deformities noted, no evidence of inflammation in joints, FROM in all extremities.     SKIN: no suspicious lesions or rashes     NEURO: Normal strength and tone, sensory exam grossly normal, mentation intact and speech normal     PSYCH: mentation appears normal. and affect normal/bright     LYMPHATICS: No cervical adenopathy    Recent Labs   Lab Test 10/29/20  0914 10/25/19  0935   HGB 14.9 15.4    224    139   POTASSIUM 4.4 4.8   CR 1.05 1.09   A1C 5.9* 6.0*        Diagnostics:  No results found for this or any previous visit (from the past 24 hour(s)).     COVID testing obtained today.    EKG: Normal Sinus Rhythm, Left Bundle Branch Block, which is new compared to prior EKG from 8/2018    Revised Cardiac Risk Index (RCRI):  The patient has the following serious cardiovascular risks for perioperative complications:   - No serious cardiac risks = 0 points     RCRI Interpretation: 0 points: Class I (very low risk - 0.4% complication rate)         Assessment & Plan   The proposed surgical procedure is considered INTERMEDIATE risk.    Preop general physical exam  - Asymptomatic COVID-19 Virus (Coronavirus) by PCR; Future  - EKG 12-lead complete w/read - (Clinic Performed)  - CBC with platelets and differential  - Basic metabolic panel  - INR  - NM Lexiscan stress test; Future  - Asymptomatic COVID-19 Virus (Coronavirus) by PCR    Degeneration of lumbar or lumbosacral intervertebral disc  - Asymptomatic COVID-19 Virus (Coronavirus) by PCR; Future  - EKG 12-lead complete w/read - (Clinic Performed)  - CBC with platelets and differential  - Basic metabolic panel  - INR  - Asymptomatic COVID-19 Virus (Coronavirus) by PCR    Lumbar disc herniation with radiculopathy    - Asymptomatic COVID-19 Virus (Coronavirus) by PCR; Future  - EKG 12-lead complete w/read - (Clinic Performed)  - CBC with  platelets and differential  - Basic metabolic panel  - INR  - Asymptomatic COVID-19 Virus (Coronavirus) by PCR    LBBB (left bundle branch block)  New compared to prior EKG 2018 - asymptomatic - but very low exercise threshold recently given back and leg pain  - minimal walking/stairs without symptoms.  - NM Lexiscan stress test; Future    Chronic pain syndrome  Stable.  - Asymptomatic COVID-19 Virus (Coronavirus) by PCR; Future  - EKG 12-lead complete w/read - (Clinic Performed)  - CBC with platelets and differential  - Basic metabolic panel  - INR  - Asymptomatic COVID-19 Virus (Coronavirus) by PCR    Obesity (BMI 30.0-34.9)    Long term (current) use of opiate analgesic  Stable.  - Asymptomatic COVID-19 Virus (Coronavirus) by PCR; Future  - EKG 12-lead complete w/read - (Clinic Performed)  - CBC with platelets and differential  - Basic metabolic panel  - INR  - Asymptomatic COVID-19 Virus (Coronavirus) by PCR    Hyperlipidemia, unspecified hyperlipidemia type    Elevated fasting glucose    Pain of lower leg, unspecified laterality  - radiculopathy  - INR    Elevated blood pressure reading without diagnosis of hypertension  No prior diagnosis - but elevated now on more than 1 occasion.  May partially be pain related.     Nonspecific abnormal electrocardiogram (ECG) (EKG)  Did review EKG briefly with Dr Jain, cardiology.   Stress test advised.  - NM Lexiscan stress test; Future    Essential hypertension  New diagnosis.  Started on low dose Lisinopril.  Discussed potential side effects.  - lisinopril (ZESTRIL) 10 MG tablet; Take 1 tablet (10 mg) by mouth daily      Risks and Recommendations:  The patient has the following additional risks and recommendations for perioperative complications:  Cardiovascular:   - Pre-op stress imaging recommended due to new LBBB  Obstructive Sleep Apnea:   Noncompliant with CPAP    Medication Instructions:   - aspirin: Discontinue aspirin 7-10 days prior to procedure to reduce  bleeding risk. It should be resumed postoperatively.     RECOMMENDATION:  Patient referred for stress test for evaluation before surgery. Surgery approval pending completion of consultation.    Signed Electronically by: Evelyn Iniguez MD    Copy of this evaluation report is provided to requesting physician.    Addendum - lexiscan stress test abnormal - large abnormality.  Patient to be scheduled with cardiology for angiogram.  Restart aspirin in the mean time.    Preop [facility] Preop Guidelines    Revised Cardiac Risk Index

## 2021-01-13 ENCOUNTER — OFFICE VISIT (OUTPATIENT)
Dept: FAMILY MEDICINE | Facility: OTHER | Age: 81
End: 2021-01-13
Attending: FAMILY MEDICINE
Payer: COMMERCIAL

## 2021-01-13 VITALS
SYSTOLIC BLOOD PRESSURE: 162 MMHG | HEIGHT: 71 IN | BODY MASS INDEX: 34.3 KG/M2 | OXYGEN SATURATION: 95 % | HEART RATE: 81 BPM | DIASTOLIC BLOOD PRESSURE: 76 MMHG | TEMPERATURE: 97.5 F | WEIGHT: 245 LBS

## 2021-01-13 DIAGNOSIS — M51.379 DEGENERATION OF LUMBAR OR LUMBOSACRAL INTERVERTEBRAL DISC: ICD-10-CM

## 2021-01-13 DIAGNOSIS — R03.0 ELEVATED BLOOD PRESSURE READING WITHOUT DIAGNOSIS OF HYPERTENSION: ICD-10-CM

## 2021-01-13 DIAGNOSIS — R73.01 ELEVATED FASTING GLUCOSE: ICD-10-CM

## 2021-01-13 DIAGNOSIS — E66.811 OBESITY (BMI 30.0-34.9): ICD-10-CM

## 2021-01-13 DIAGNOSIS — M79.669 PAIN OF LOWER LEG, UNSPECIFIED LATERALITY: ICD-10-CM

## 2021-01-13 DIAGNOSIS — G89.4 CHRONIC PAIN SYNDROME: ICD-10-CM

## 2021-01-13 DIAGNOSIS — I10 ESSENTIAL HYPERTENSION: ICD-10-CM

## 2021-01-13 DIAGNOSIS — M51.16 LUMBAR DISC HERNIATION WITH RADICULOPATHY: ICD-10-CM

## 2021-01-13 DIAGNOSIS — E78.5 HYPERLIPIDEMIA, UNSPECIFIED HYPERLIPIDEMIA TYPE: ICD-10-CM

## 2021-01-13 DIAGNOSIS — I44.7 LBBB (LEFT BUNDLE BRANCH BLOCK): ICD-10-CM

## 2021-01-13 DIAGNOSIS — R94.31 NONSPECIFIC ABNORMAL ELECTROCARDIOGRAM (ECG) (EKG): ICD-10-CM

## 2021-01-13 DIAGNOSIS — R94.39 ABNORMAL STRESS TEST: ICD-10-CM

## 2021-01-13 DIAGNOSIS — Z79.891 LONG TERM (CURRENT) USE OF OPIATE ANALGESIC: ICD-10-CM

## 2021-01-13 DIAGNOSIS — Z01.818 PREOP GENERAL PHYSICAL EXAM: Primary | ICD-10-CM

## 2021-01-13 LAB
ANION GAP SERPL CALCULATED.3IONS-SCNC: 9 MMOL/L (ref 3–14)
BASOPHILS # BLD AUTO: 0.1 10E9/L (ref 0–0.2)
BASOPHILS NFR BLD AUTO: 1 %
BUN SERPL-MCNC: 14 MG/DL (ref 7–30)
CALCIUM SERPL-MCNC: 9.9 MG/DL (ref 8.5–10.1)
CHLORIDE SERPL-SCNC: 105 MMOL/L (ref 94–109)
CO2 SERPL-SCNC: 26 MMOL/L (ref 20–32)
CREAT SERPL-MCNC: 0.91 MG/DL (ref 0.66–1.25)
DIFFERENTIAL METHOD BLD: NORMAL
EOSINOPHIL # BLD AUTO: 0.3 10E9/L (ref 0–0.7)
EOSINOPHIL NFR BLD AUTO: 5 %
ERYTHROCYTE [DISTWIDTH] IN BLOOD BY AUTOMATED COUNT: 13.4 % (ref 10–15)
GFR SERPL CREATININE-BSD FRML MDRD: 80 ML/MIN/{1.73_M2}
GLUCOSE SERPL-MCNC: 106 MG/DL (ref 70–99)
HCT VFR BLD AUTO: 44.1 % (ref 40–53)
HGB BLD-MCNC: 14.3 G/DL (ref 13.3–17.7)
IMM GRANULOCYTES # BLD: 0 10E9/L (ref 0–0.4)
IMM GRANULOCYTES NFR BLD: 0.3 %
INR PPP: 0.98 (ref 0.86–1.14)
LYMPHOCYTES # BLD AUTO: 1.7 10E9/L (ref 0.8–5.3)
LYMPHOCYTES NFR BLD AUTO: 27.5 %
MCH RBC QN AUTO: 28.9 PG (ref 26.5–33)
MCHC RBC AUTO-ENTMCNC: 32.4 G/DL (ref 31.5–36.5)
MCV RBC AUTO: 89 FL (ref 78–100)
MONOCYTES # BLD AUTO: 0.7 10E9/L (ref 0–1.3)
MONOCYTES NFR BLD AUTO: 10.9 %
NEUTROPHILS # BLD AUTO: 3.4 10E9/L (ref 1.6–8.3)
NEUTROPHILS NFR BLD AUTO: 55.3 %
NRBC # BLD AUTO: 0 10*3/UL
NRBC BLD AUTO-RTO: 0 /100
PLATELET # BLD AUTO: 287 10E9/L (ref 150–450)
POTASSIUM SERPL-SCNC: 4.3 MMOL/L (ref 3.4–5.3)
RBC # BLD AUTO: 4.95 10E12/L (ref 4.4–5.9)
SARS-COV-2 RNA RESP QL NAA+PROBE: NORMAL
SODIUM SERPL-SCNC: 140 MMOL/L (ref 133–144)
SPECIMEN SOURCE: NORMAL
WBC # BLD AUTO: 6.2 10E9/L (ref 4–11)

## 2021-01-13 PROCEDURE — U0005 INFEC AGEN DETEC AMPLI PROBE: HCPCS | Mod: ZL | Performed by: FAMILY MEDICINE

## 2021-01-13 PROCEDURE — U0003 INFECTIOUS AGENT DETECTION BY NUCLEIC ACID (DNA OR RNA); SEVERE ACUTE RESPIRATORY SYNDROME CORONAVIRUS 2 (SARS-COV-2) (CORONAVIRUS DISEASE [COVID-19]), AMPLIFIED PROBE TECHNIQUE, MAKING USE OF HIGH THROUGHPUT TECHNOLOGIES AS DESCRIBED BY CMS-2020-01-R: HCPCS | Mod: ZL | Performed by: FAMILY MEDICINE

## 2021-01-13 PROCEDURE — 85610 PROTHROMBIN TIME: CPT | Mod: ZL | Performed by: FAMILY MEDICINE

## 2021-01-13 PROCEDURE — 85025 COMPLETE CBC W/AUTO DIFF WBC: CPT | Mod: ZL | Performed by: FAMILY MEDICINE

## 2021-01-13 PROCEDURE — 93005 ELECTROCARDIOGRAM TRACING: CPT

## 2021-01-13 PROCEDURE — 36415 COLL VENOUS BLD VENIPUNCTURE: CPT | Mod: ZL | Performed by: FAMILY MEDICINE

## 2021-01-13 PROCEDURE — G0463 HOSPITAL OUTPT CLINIC VISIT: HCPCS | Mod: 25

## 2021-01-13 PROCEDURE — 99214 OFFICE O/P EST MOD 30 MIN: CPT | Performed by: FAMILY MEDICINE

## 2021-01-13 PROCEDURE — 93010 ELECTROCARDIOGRAM REPORT: CPT | Performed by: INTERNAL MEDICINE

## 2021-01-13 PROCEDURE — 80048 BASIC METABOLIC PNL TOTAL CA: CPT | Mod: ZL | Performed by: FAMILY MEDICINE

## 2021-01-13 RX ORDER — LISINOPRIL 10 MG/1
10 TABLET ORAL DAILY
Qty: 30 TABLET | Refills: 1 | Status: SHIPPED | OUTPATIENT
Start: 2021-01-13 | End: 2021-01-29

## 2021-01-13 ASSESSMENT — PAIN SCALES - GENERAL: PAINLEVEL: SEVERE PAIN (7)

## 2021-01-13 ASSESSMENT — MIFFLIN-ST. JEOR: SCORE: 1840.5

## 2021-01-13 NOTE — NURSING NOTE
"Chief Complaint   Patient presents with     Pre-Op Exam       Initial BP (!) 154/90 (BP Location: Left arm, Patient Position: Chair, Cuff Size: Adult Large)   Pulse 81   Temp 97.5  F (36.4  C) (Tympanic)   Ht 1.791 m (5' 10.5\")   Wt 111.1 kg (245 lb)   SpO2 95%   BMI 34.66 kg/m   Estimated body mass index is 34.66 kg/m  as calculated from the following:    Height as of this encounter: 1.791 m (5' 10.5\").    Weight as of this encounter: 111.1 kg (245 lb).  Medication Reconciliation: complete  Kirby Wayne LPN  "

## 2021-01-14 ENCOUNTER — HOSPITAL ENCOUNTER (OUTPATIENT)
Dept: NUCLEAR MEDICINE | Facility: HOSPITAL | Age: 81
Setting detail: NUCLEAR MEDICINE
End: 2021-01-14
Attending: FAMILY MEDICINE
Payer: COMMERCIAL

## 2021-01-14 ENCOUNTER — HOSPITAL ENCOUNTER (OUTPATIENT)
Dept: CARDIOLOGY | Facility: HOSPITAL | Age: 81
Setting detail: NUCLEAR MEDICINE
End: 2021-01-14
Attending: FAMILY MEDICINE
Payer: COMMERCIAL

## 2021-01-14 DIAGNOSIS — Z01.818 PREOP GENERAL PHYSICAL EXAM: ICD-10-CM

## 2021-01-14 DIAGNOSIS — I44.7 LBBB (LEFT BUNDLE BRANCH BLOCK): ICD-10-CM

## 2021-01-14 DIAGNOSIS — R94.31 NONSPECIFIC ABNORMAL ELECTROCARDIOGRAM (ECG) (EKG): ICD-10-CM

## 2021-01-14 LAB
CV BLOOD PRESSURE: 55 %
CV STRESS MAX HR HE: 103
LABORATORY COMMENT REPORT: NORMAL
NUC STRESS EJECTION FRACTION: 60 %
RATE PRESSURE PRODUCT: NORMAL
SARS-COV-2 RNA RESP QL NAA+PROBE: NEGATIVE
SPECIMEN SOURCE: NORMAL
STRESS ECHO BASELINE DIASTOLIC HE: 80
STRESS ECHO BASELINE HR: 82
STRESS ECHO BASELINE SYSTOLIC BP: 150
STRESS ECHO CALCULATED PERCENT HR: 73 %
STRESS ECHO LAST STRESS DIASTOLIC BP: 70
STRESS ECHO LAST STRESS SYSTOLIC BP: 140
STRESS ECHO TARGET HR: 141

## 2021-01-14 PROCEDURE — A9500 TC99M SESTAMIBI: HCPCS | Performed by: RADIOLOGY

## 2021-01-14 PROCEDURE — 93016 CV STRESS TEST SUPVJ ONLY: CPT | Performed by: INTERNAL MEDICINE

## 2021-01-14 PROCEDURE — 343N000001 HC RX 343: Performed by: RADIOLOGY

## 2021-01-14 PROCEDURE — 93018 CV STRESS TEST I&R ONLY: CPT | Performed by: INTERNAL MEDICINE

## 2021-01-14 PROCEDURE — 93017 CV STRESS TEST TRACING ONLY: CPT | Performed by: INTERNAL MEDICINE

## 2021-01-14 PROCEDURE — 78452 HT MUSCLE IMAGE SPECT MULT: CPT

## 2021-01-14 PROCEDURE — 250N000011 HC RX IP 250 OP 636: Performed by: FAMILY MEDICINE

## 2021-01-14 RX ORDER — REGADENOSON 0.08 MG/ML
0.4 INJECTION, SOLUTION INTRAVENOUS ONCE
Status: COMPLETED | OUTPATIENT
Start: 2021-01-14 | End: 2021-01-14

## 2021-01-14 RX ORDER — AMINOPHYLLINE 25 MG/ML
INJECTION, SOLUTION INTRAVENOUS
Status: DISCONTINUED
Start: 2021-01-14 | End: 2021-01-14 | Stop reason: WASHOUT

## 2021-01-14 RX ADMIN — Medication 31.3 MILLICURIE: at 10:42

## 2021-01-14 RX ADMIN — Medication 10.2 MILLICURIE: at 08:12

## 2021-01-14 RX ADMIN — REGADENOSON 0.4 MG: 0.08 INJECTION, SOLUTION INTRAVENOUS at 10:37

## 2021-01-26 NOTE — PROGRESS NOTES
"Zoran is a 81 year old who is being evaluated via a billable telephone visit.      What phone number would you like to be contacted at? [phone]  How would you like to obtain your AVS? Pt Declined  Assessment & Plan     Chronic pain syndrome  Stable overall - feels he would like to go back to his usual 1 at a time Norco rather than 2.  Same pain control.  Refill done.  Surgery upcoming 2/3/2021.  - HYDROcodone-acetaminophen (NORCO)  MG per tablet; Take 1-2 tablets by mouth 4 times daily as needed for pain    Degeneration of lumbar or lumbosacral intervertebral disc  As above.    Essential hypertension  Home readings above goal. Tolerating Lisinopril 10 mg.  Increase to 20mg.   Reassess post operatively.  - lisinopril (ZESTRIL) 10 MG tablet; Take 2 tablets (20 mg) by mouth daily               BMI:   Estimated body mass index is 35.15 kg/m  as calculated from the following:    Height as of this encounter: 1.778 m (5' 10\").    Weight as of this encounter: 111.1 kg (245 lb).         See Patient Instructions    Return in about 1 month (around 2/28/2021) for BP Recheck.    Evelyn Iniguez MD  [facility] - [place]    Subjective     Zoran is a 81 year old who presents to clinic today for the following health issues     HPI     Chronic Pain Follow-Up  Where in your body do you have pain? Low back- right hip/leg/ankle  How has your pain affected your ability to work? Not applicable  Which of these pain treatments have you tried since your last clinic visit? Other: none  How well are you sleeping? Fair  How has your mood been since your last visit? About the same  Have you had a significant life event? No  Other aggravating factors: prolonged sitting, prolonged standing, poor posture and repetitive activities - bending   Taking medication as directed? Yes    PHQ-9 SCORE 7/25/2019 7/29/2020   PHQ-9 Total Score 1 0     ROBYN-7 SCORE 7/25/2019 7/29/2020   Total Score 0 0     No flowsheet data " found.  Encounter-Level CSA:    There are no encounter-level csa.     Patient-Level CSA:    Controlled Substance Agreement - Opioid - Scan on 10/30/2020 10:59 AM: OPIOD/OPIOD PLUS CONTROLLED SUBSTANCE AGREEMENT  Controlled Substance Agreement - Opioid - Scan on 7/26/2019 12:01 PM: OPIOD/OPIOD PLUS CONTROLLED SUBSTANCE AGREEMENT         How many servings of fruits and vegetables do you eat daily?  2-3    On average, how many sweetened beverages do you drink each day (Examples: soda, juice, sweet tea, etc.  Do NOT count diet or artificially sweetened beverages)?   0    How many days per week do you exercise enough to make your heart beat faster? 3 or less    How many minutes a day do you exercise enough to make your heart beat faster? 9 or less    How many days per week do you miss taking your medication? 0    Had abnormal EKG for preop, then abnormal stress test.  Then saw cardiology.  Now cleared to proceed with spine surgery via [facility].  Did not have angiogram.  Had what sounds like CTA.  Blockage, but not enough to do any intervention.  Surgery scheduled for 2/3/2021 now.    Lortab increased to  mg 1-2 tabs 4 times per day as needed, 240 per month - from prior 1 tab ever 4 hours as needed, 150 per month.    177/77, 165/92, 153/75.  Pulse 70s.  Lisinopril 10 mg.      Review of Systems   Constitutional, HEENT, cardiovascular, pulmonary, gi and gu systems are negative, except as otherwise noted.      Objective    Vitals - Patient Reported  Pain Score: Extreme Pain (8)  Pain Loc: Other - see comment(hip/leg/ankle (right) )      Vitals:  No vitals were obtained today due to virtual visit.    Physical Exam   healthy, alert and no distress  PSYCH: Alert and oriented times 3; coherent speech, normal   rate and volume, able to articulate logical thoughts, able   to abstract reason, no tangential thoughts, no hallucinations   or delusions  His affect is normal  RESP: No cough, no audible wheezing, able to  talk in full sentences  Remainder of exam unable to be completed due to telephone visits                Phone call duration: 6:25 minutes

## 2021-01-27 ENCOUNTER — TELEPHONE (OUTPATIENT)
Dept: FAMILY MEDICINE | Facility: OTHER | Age: 81
End: 2021-01-27

## 2021-01-27 DIAGNOSIS — M51.16 LUMBAR DISC HERNIATION WITH RADICULOPATHY: ICD-10-CM

## 2021-01-27 DIAGNOSIS — M51.379 DEGENERATION OF LUMBAR OR LUMBOSACRAL INTERVERTEBRAL DISC: Primary | ICD-10-CM

## 2021-01-27 NOTE — TELEPHONE ENCOUNTER
Please advise to prior note- my apologies, was unsure which order to pend to meet request.   Erica Cox LPN

## 2021-01-27 NOTE — TELEPHONE ENCOUNTER
Thank you, LVM for pt to call back to inform of signed referral. See prior notes for more detail.  Erica Cox LPN

## 2021-01-27 NOTE — TELEPHONE ENCOUNTER
Call from patient requesting a referral for spine surgery.     Surgery was previously put on hold due to Cardiology symptoms, patient has been cleared by the cardiologist to go forward with the surgery. Is awaiting a referral to be sent to [facility], Dr. Raina Reddy from Dr. Olson.     Pt may be reached at [phone].

## 2021-01-29 ENCOUNTER — VIRTUAL VISIT (OUTPATIENT)
Dept: FAMILY MEDICINE | Facility: OTHER | Age: 81
End: 2021-01-29
Attending: FAMILY MEDICINE
Payer: COMMERCIAL

## 2021-01-29 ENCOUNTER — OFFICE VISIT (OUTPATIENT)
Dept: FAMILY MEDICINE | Facility: OTHER | Age: 81
End: 2021-01-29
Attending: SPECIALIST
Payer: COMMERCIAL

## 2021-01-29 VITALS
WEIGHT: 245 LBS | HEART RATE: 74 BPM | SYSTOLIC BLOOD PRESSURE: 165 MMHG | DIASTOLIC BLOOD PRESSURE: 92 MMHG | BODY MASS INDEX: 35.07 KG/M2 | HEIGHT: 70 IN

## 2021-01-29 DIAGNOSIS — M51.379 DEGENERATION OF LUMBAR OR LUMBOSACRAL INTERVERTEBRAL DISC: ICD-10-CM

## 2021-01-29 DIAGNOSIS — I10 ESSENTIAL HYPERTENSION: ICD-10-CM

## 2021-01-29 DIAGNOSIS — G89.4 CHRONIC PAIN SYNDROME: Primary | ICD-10-CM

## 2021-01-29 DIAGNOSIS — Z20.822 COVID-19 RULED OUT: Primary | ICD-10-CM

## 2021-01-29 LAB
SARS-COV-2 RNA RESP QL NAA+PROBE: NORMAL
SPECIMEN SOURCE: NORMAL

## 2021-01-29 PROCEDURE — U0005 INFEC AGEN DETEC AMPLI PROBE: HCPCS | Mod: ZL | Performed by: FAMILY MEDICINE

## 2021-01-29 PROCEDURE — G0463 HOSPITAL OUTPT CLINIC VISIT: HCPCS

## 2021-01-29 PROCEDURE — 99213 OFFICE O/P EST LOW 20 MIN: CPT | Mod: TEL | Performed by: FAMILY MEDICINE

## 2021-01-29 PROCEDURE — U0003 INFECTIOUS AGENT DETECTION BY NUCLEIC ACID (DNA OR RNA); SEVERE ACUTE RESPIRATORY SYNDROME CORONAVIRUS 2 (SARS-COV-2) (CORONAVIRUS DISEASE [COVID-19]), AMPLIFIED PROBE TECHNIQUE, MAKING USE OF HIGH THROUGHPUT TECHNOLOGIES AS DESCRIBED BY CMS-2020-01-R: HCPCS | Mod: ZL | Performed by: FAMILY MEDICINE

## 2021-01-29 RX ORDER — HYDROCODONE BITARTRATE AND ACETAMINOPHEN 10; 325 MG/1; MG/1
1-2 TABLET ORAL 4 TIMES DAILY PRN
Qty: 150 TABLET | Refills: 0 | Status: SHIPPED | OUTPATIENT
Start: 2021-01-29 | End: 2021-03-16

## 2021-01-29 RX ORDER — LISINOPRIL 10 MG/1
20 TABLET ORAL DAILY
Qty: 30 TABLET | Refills: 1 | COMMUNITY
Start: 2021-01-29 | End: 2021-02-11

## 2021-01-29 ASSESSMENT — MIFFLIN-ST. JEOR: SCORE: 1832.56

## 2021-01-29 ASSESSMENT — PAIN SCALES - GENERAL: PAINLEVEL: EXTREME PAIN (8)

## 2021-01-29 NOTE — PATIENT INSTRUCTIONS
Norco refilled back at the 150 mg per month schedule.  Increase Lisinopril to 20 mg.  Reassess BP in office post operatively.

## 2021-01-29 NOTE — NURSING NOTE
"Chief Complaint   Patient presents with     Pain     chronic        Initial BP (!) 165/92   Pulse 74   Ht 1.778 m (5' 10\")   Wt 111.1 kg (245 lb)   BMI 35.15 kg/m   Estimated body mass index is 35.15 kg/m  as calculated from the following:    Height as of this encounter: 1.778 m (5' 10\").    Weight as of this encounter: 111.1 kg (245 lb).  Medication Reconciliation: complete  Erica oCx LPN  "

## 2021-01-29 NOTE — NURSING NOTE
"Chief Complaint   Patient presents with     Pain     chronic      Recheck Medication       Initial BP (!) 165/92   Pulse 74   Ht 1.778 m (5' 10\")   Wt 111.1 kg (245 lb)   BMI 35.15 kg/m   Estimated body mass index is 35.15 kg/m  as calculated from the following:    Height as of this encounter: 1.778 m (5' 10\").    Weight as of this encounter: 111.1 kg (245 lb).  Medication Reconciliation: complete  Erica Cox LPN  "

## 2021-01-30 LAB
LABORATORY COMMENT REPORT: NORMAL
SARS-COV-2 RNA RESP QL NAA+PROBE: NEGATIVE
SPECIMEN SOURCE: NORMAL

## 2021-02-01 DIAGNOSIS — I10 ESSENTIAL HYPERTENSION: Primary | ICD-10-CM

## 2021-02-01 RX ORDER — LISINOPRIL 20 MG/1
20 TABLET ORAL DAILY
Qty: 90 TABLET | Refills: 1 | Status: SHIPPED | OUTPATIENT
Start: 2021-02-01 | End: 2021-06-03 | Stop reason: SINTOL

## 2021-02-01 NOTE — TELEPHONE ENCOUNTER
lisnopril      Last Written Prescription Date:  Patient states suppose to be on 20mg tablet  Last Fill Quantity: 30,   # refills: 1  Last Office Visit: 1/29/21  Future Office visit:

## 2021-02-02 ENCOUNTER — TELEPHONE (OUTPATIENT)
Dept: FAMILY MEDICINE | Facility: OTHER | Age: 81
End: 2021-02-02

## 2021-02-02 NOTE — TELEPHONE ENCOUNTER
Miranda with  from [facility] calling for Covid results from 1.29.2021.    She needs these faxed today-pt is having surgery tomorrow.     Fax to [phone]      Call back with questions at [phone]      Patricia Garcia RN

## 2021-02-03 ENCOUNTER — TRANSFERRED RECORDS (OUTPATIENT)
Dept: HEALTH INFORMATION MANAGEMENT | Facility: CLINIC | Age: 81
End: 2021-02-03

## 2021-02-08 ENCOUNTER — TELEPHONE (OUTPATIENT)
Dept: FAMILY MEDICINE | Facility: OTHER | Age: 81
End: 2021-02-08

## 2021-02-08 NOTE — TELEPHONE ENCOUNTER
8:59 AM    Reason for Call: OVERBOOK    Patient had surgery at [facility] with Dr Brown he got released from the hospital on 02/05/2021 and he needs to see his primary 1 to 5 days for a hospital follow up.     The patient is requesting an appointment for hospital follow up with Dr Olson.    Was an appointment offered for this call? No  If yes : Appointment type              Date    Preferred method for responding to this message: Telephone Call  What is your phone number ? [phone]    If we cannot reach you directly, may we leave a detailed response at the number you provided? Yes    Can this message wait until your PCP/provider returns, if unavailable today? YES, No    Joyce Zurita

## 2021-02-11 ENCOUNTER — OFFICE VISIT (OUTPATIENT)
Dept: FAMILY MEDICINE | Facility: OTHER | Age: 81
End: 2021-02-11
Attending: FAMILY MEDICINE
Payer: COMMERCIAL

## 2021-02-11 VITALS
RESPIRATION RATE: 20 BRPM | WEIGHT: 240 LBS | TEMPERATURE: 96.9 F | HEART RATE: 89 BPM | HEIGHT: 70 IN | BODY MASS INDEX: 34.36 KG/M2 | SYSTOLIC BLOOD PRESSURE: 140 MMHG | OXYGEN SATURATION: 97 % | DIASTOLIC BLOOD PRESSURE: 62 MMHG

## 2021-02-11 DIAGNOSIS — I10 ESSENTIAL HYPERTENSION: ICD-10-CM

## 2021-02-11 DIAGNOSIS — Z98.890 S/P SPINAL SURGERY: Primary | ICD-10-CM

## 2021-02-11 PROCEDURE — 99213 OFFICE O/P EST LOW 20 MIN: CPT | Performed by: FAMILY MEDICINE

## 2021-02-11 PROCEDURE — G0463 HOSPITAL OUTPT CLINIC VISIT: HCPCS

## 2021-02-11 RX ORDER — AMOXICILLIN 250 MG
2-4 CAPSULE ORAL
COMMUNITY
Start: 2021-02-05 | End: 2021-04-16

## 2021-02-11 ASSESSMENT — PAIN SCALES - GENERAL: PAINLEVEL: MODERATE PAIN (5)

## 2021-02-11 ASSESSMENT — MIFFLIN-ST. JEOR: SCORE: 1809.88

## 2021-02-11 NOTE — PATIENT INSTRUCTIONS
Continue checking blood pressure a couple times per week.  Call with updates if able.     Follow up 4/2021 for routine chronic pain check.

## 2021-02-11 NOTE — PROGRESS NOTES
"    Assessment & Plan     S/P spinal surgery  Healing well.  No complications.  Follow up with surgeon as arranged.    Essential hypertension  Not quite at goal here, but home readings better.  Anticipate it will improve over next weeks with healing from surgery. Will continue home monitoring.  Continue current medication regimen.  Due for routine chronic pain visit in 4/2021.  Will recheck at that time, sooner if concerns with home readings.      Review of prior external note(s) from - [facility]Everywhere information from [facility] reviewed         BMI:   Estimated body mass index is 34.44 kg/m  as calculated from the following:    Height as of this encounter: 1.778 m (5' 10\").    Weight as of this encounter: 108.9 kg (240 lb).   Weight management plan: Discussed healthy diet and exercise guidelines    Patient Instructions   Continue checking blood pressure a couple times per week.  Call with updates if able.     Follow up 4/2021 for routine chronic pain check.      Return in about 2 months (around 4/11/2021) for chronic pain.    Evelyn Iniguez MD  [facility] [place]    Rosalio Meehan is a 81 year old who presents for the following health issues     HPI       Hospital Follow-up Visit:  Hospital/Nursing Home/IP Rehab Facility: [facility]  Date of Admission: 2/3/21  Date of Discharge: 2/5/21  Reason(s) for Admission: lumbar spinal stenosis with radiculopathy; s/pdecompression- hemilaminotomy/discectomy L4/5; posterior spine fusion; and hardware removal; Dr Brown      Was your hospitalization related to COVID-19? No   Problems taking medications regularly:  None  Medication changes since discharge: None  Problems adhering to non-medication therapy:  None    Summary of hospitalization:  CareEverywhere information obtained and reviewed; [facility]  Diagnostic Tests/Treatments reviewed.  Follow up needed: 6 week post op check with surgeon  Other Healthcare Providers Involved in " Neurosurgery [facility], requested to speak with Dr. Lam.   "Patient s Care:         None  Update since discharge: improved.   Post Discharge Medication Reconciliation: discharge medications reconciled, continue medications without change.  Plan of care communicated with patient    Raul and capo - helpful for constipation.     No PT needed.  Just walking for exercise - without assisted device.  No falls.         Hypertension Follow-up - recent addition of Lisinopril pre operatively    Do you check your blood pressure regularly outside of the clinic? Yes 148/67, 148/67, 149/71, 137/69, 134/70    Are you following a low salt diet? No    Are your blood pressures ever more than 140 on the top number (systolic) OR more   than 90 on the bottom number (diastolic), for example 140/90? Yes 140s as above        Review of Systems   Constitutional, HEENT, cardiovascular, pulmonary, gi and gu systems are negative, except as otherwise noted.      Objective    BP (!) 140/62 (BP Location: Left arm, Patient Position: Sitting, Cuff Size: Adult Regular)   Pulse 89   Temp 96.9  F (36.1  C) (Tympanic)   Resp 20   Ht 1.778 m (5' 10\")   Wt 108.9 kg (240 lb)   SpO2 97%   BMI 34.44 kg/m    Body mass index is 34.44 kg/m .  Physical Exam   GENERAL: alert, no distress and over weight  NECK: no adenopathy, no asymmetry, masses, or scars and thyroid normal to palpation  RESP: lungs clear to auscultation - no rales, rhonchi or wheezes  CV: regular rate and rhythm, normal S1 S2, no S3 or S4, no murmur, click or rub, no peripheral edema and peripheral pulses strong  ABDOMEN: soft, nontender, no hepatosplenomegaly, no masses and bowel sounds normal  MS: no gross musculoskeletal defects noted, no edema  SKIN: spine incision healing well; few steri strips still in place; intact; no drainage  PSYCH: mentation appears normal, affect normal/bright                "

## 2021-02-11 NOTE — NURSING NOTE
"Chief Complaint   Patient presents with     Hospital F/U       Initial BP (!) 140/62 (BP Location: Left arm, Patient Position: Sitting, Cuff Size: Adult Regular)   Pulse 89   Temp 96.9  F (36.1  C) (Tympanic)   Resp 20   Ht 1.778 m (5' 10\")   Wt 108.9 kg (240 lb)   SpO2 97%   BMI 34.44 kg/m   Estimated body mass index is 34.44 kg/m  as calculated from the following:    Height as of this encounter: 1.778 m (5' 10\").    Weight as of this encounter: 108.9 kg (240 lb).  Medication Reconciliation: complete  June Dickey LPN  "

## 2021-02-24 ENCOUNTER — IMMUNIZATION (OUTPATIENT)
Dept: FAMILY MEDICINE | Facility: OTHER | Age: 81
End: 2021-02-24
Attending: FAMILY MEDICINE
Payer: COMMERCIAL

## 2021-02-24 PROCEDURE — 91300 PR COVID VAC PFIZER DIL RECON 30 MCG/0.3 ML IM: CPT

## 2021-03-15 ENCOUNTER — IMMUNIZATION (OUTPATIENT)
Dept: FAMILY MEDICINE | Facility: OTHER | Age: 81
End: 2021-03-15
Attending: FAMILY MEDICINE
Payer: COMMERCIAL

## 2021-03-15 PROCEDURE — 91300 PR COVID VAC PFIZER DIL RECON 30 MCG/0.3 ML IM: CPT | Performed by: COUNSELOR

## 2021-03-16 DIAGNOSIS — G89.4 CHRONIC PAIN SYNDROME: ICD-10-CM

## 2021-03-16 RX ORDER — HYDROCODONE BITARTRATE AND ACETAMINOPHEN 10; 325 MG/1; MG/1
1-2 TABLET ORAL 4 TIMES DAILY PRN
Qty: 150 TABLET | Refills: 0 | Status: SHIPPED | OUTPATIENT
Start: 2021-03-16 | End: 2021-04-16

## 2021-03-16 NOTE — TELEPHONE ENCOUNTER
HYDROcodone-acetaminophen (NORCO)  MG per tablet      Last Written Prescription Date:  1/29/21  Last Fill Quantity: 150,   # refills: 0  Last Office Visit: 2/11/21  Future Office visit:    Next 5 appointments (look out 90 days)    Apr 16, 2021  9:15 AM  (Arrive by 9:00 AM)  SHORT with Evelyn Olson MD  [facility] ([facility] [place] ) [address]  [place] [address]  [phone]           Routing refill request to provider for review/approval because:  Drug not on the [facility], P or [facility] refill protocol or controlled substance

## 2021-03-18 ENCOUNTER — TRANSFERRED RECORDS (OUTPATIENT)
Dept: HEALTH INFORMATION MANAGEMENT | Facility: CLINIC | Age: 81
End: 2021-03-18

## 2021-04-05 DIAGNOSIS — G89.29 CHRONIC LEFT-SIDED LOW BACK PAIN WITH LEFT-SIDED SCIATICA: ICD-10-CM

## 2021-04-05 DIAGNOSIS — M54.42 CHRONIC LEFT-SIDED LOW BACK PAIN WITH LEFT-SIDED SCIATICA: ICD-10-CM

## 2021-04-05 DIAGNOSIS — E78.5 HYPERLIPIDEMIA, UNSPECIFIED HYPERLIPIDEMIA TYPE: ICD-10-CM

## 2021-04-05 RX ORDER — PREGABALIN 150 MG/1
CAPSULE ORAL
Qty: 90 CAPSULE | Refills: 0 | Status: SHIPPED | OUTPATIENT
Start: 2021-04-05 | End: 2021-05-18

## 2021-04-05 RX ORDER — DULOXETIN HYDROCHLORIDE 60 MG/1
CAPSULE, DELAYED RELEASE ORAL
Qty: 180 CAPSULE | Refills: 1 | Status: SHIPPED | OUTPATIENT
Start: 2021-04-05 | End: 2021-04-16

## 2021-04-05 RX ORDER — ATORVASTATIN CALCIUM 80 MG/1
TABLET, FILM COATED ORAL
Qty: 90 TABLET | Refills: 0 | Status: SHIPPED | OUTPATIENT
Start: 2021-04-05 | End: 2021-07-14

## 2021-04-05 NOTE — TELEPHONE ENCOUNTER
Lyrica  Last Written Prescription Date: 10/5/20  Last Fill Quantity: 270 # of Refills: 1  Last Office Visit: 2/11/21

## 2021-04-12 NOTE — PROGRESS NOTES
Assessment & Plan     Chronic pain syndrome  Doing fantastic.  Weaning off medications.      Chronic, continuous use of opioids  Off norco.  Removed from list.    PDMP reviewed.    Degeneration of lumbar or lumbosacral intervertebral disc  S/p surgery.  Follow up later this month.    Degeneration of cervical intervertebral disc    Chronic left-sided low back pain with left-sided sciatica  Cymbalta reduced.  - DULoxetine (CYMBALTA) 60 MG capsule; Take 1 capsule (60 mg) by mouth daily         Patient Instructions   Stop Norco.  Taken off active med list.  Can check with pharmacy on turning medications in.  Police department as well.    Continue Cymbalta at reduced dose of 60 mg once per day.  Consider going to 30 mg daily for a few weeks prior to stopping.  Call for script for this when ready.  Check with your back surgeon this month as well.    Continue Lyrica as is for now.    If dizziness happening - check blood pressure - is it low??      Return in about 6 months (around 10/16/2021) for chronic pain; fasting labs;, hypertension; lipids.    Evelyn Iniguez MD  [facility] - [place] Meehan is a 81 year old who presents for the following health issues     HPI     Chronic Pain Follow-Up    Where in your body do you have pain? Lower back  How has your pain affected your ability to work? Not applicable  Which of these pain treatments have you tried since your last clinic visit? Surgery   How well are you sleeping? Fair  How has your mood been since your last visit? About the same  Have you had a significant life event? No  Other aggravating factors: none  Taking medication as directed? Yes, patient had surgery 2/03/2021 has since decreased Cymbalta to once daily and almost stopped completely taking Norco.  Last dose Norco 1-2 weeks ago.  Cymbalta change 1 week ago.    Slowly decreased Norco on own.    UDS and contract up to date.  PDMP reviewed.    Lyrica TID.  Recalls running out of  it once.  Was miserable.  Norco 150 per month. Quite a few left.  3/4 of bottle.    Legs below knee - still ache.  Right great toe is numb.  At night, big toe is painful when touching sheets.    No side effects.  No confusion.  No falls.   Bowels moving well.    Active - daily activities.  Walking.  Still lifting restrictions - 25 pounds.  Has back follow up 4/29/2021.    Occasional dizziness.  Usually when gets up quickly.    PHQ-9 SCORE 7/25/2019 7/29/2020   PHQ-9 Total Score 1 0     ROBYN-7 SCORE 7/25/2019 7/29/2020   Total Score 0 0     No flowsheet data found.  Encounter-Level CSA:    There are no encounter-level csa.     Patient-Level CSA:    Controlled Substance Agreement - Opioid - Scan on 10/30/2020 10:59 AM: OPIOD/OPIOD PLUS CONTROLLED SUBSTANCE AGREEMENT  Controlled Substance Agreement - Opioid - Scan on 7/26/2019 12:01 PM: OPIOD/OPIOD PLUS CONTROLLED SUBSTANCE AGREEMENT             Review of Systems   Constitutional, HEENT, cardiovascular, pulmonary, gi and gu systems are negative, except as otherwise noted.      Objective    /84 (BP Location: Left arm, Patient Position: Chair, Cuff Size: Adult Large)   Pulse 76   Temp 98  F (36.7  C) (Tympanic)   Wt 110.7 kg (244 lb)   SpO2 97%   BMI 35.01 kg/m    Body mass index is 35.01 kg/m .  Physical Exam   GENERAL: healthy, alert and no distress  NECK: no adenopathy, no asymmetry, masses, or scars and thyroid normal to palpation  RESP: lungs clear to auscultation - no rales, rhonchi or wheezes  CV: regular rate and rhythm, normal S1 S2, no S3 or S4, no murmur, click or rub, no peripheral edema and peripheral pulses strong  ABDOMEN: soft, nontender, no hepatosplenomegaly, no masses and bowel sounds normal  MS: normal muscle tone, peripheral pulses normal and no edema; back scar - well healed - non-tender; negative SLR bilaterally; symmetric reflexes; strength 5/5 lower extremities  PSYCH: mentation appears normal, affect normal/bright

## 2021-04-16 ENCOUNTER — OFFICE VISIT (OUTPATIENT)
Dept: FAMILY MEDICINE | Facility: OTHER | Age: 81
End: 2021-04-16
Attending: FAMILY MEDICINE
Payer: COMMERCIAL

## 2021-04-16 VITALS
SYSTOLIC BLOOD PRESSURE: 138 MMHG | DIASTOLIC BLOOD PRESSURE: 84 MMHG | TEMPERATURE: 98 F | HEART RATE: 76 BPM | WEIGHT: 244 LBS | BODY MASS INDEX: 35.01 KG/M2 | OXYGEN SATURATION: 97 %

## 2021-04-16 DIAGNOSIS — F11.90 CHRONIC, CONTINUOUS USE OF OPIOIDS: Chronic | ICD-10-CM

## 2021-04-16 DIAGNOSIS — G89.29 CHRONIC LEFT-SIDED LOW BACK PAIN WITH LEFT-SIDED SCIATICA: ICD-10-CM

## 2021-04-16 DIAGNOSIS — M50.30 DEGENERATION OF CERVICAL INTERVERTEBRAL DISC: ICD-10-CM

## 2021-04-16 DIAGNOSIS — G89.4 CHRONIC PAIN SYNDROME: Primary | ICD-10-CM

## 2021-04-16 DIAGNOSIS — M51.379 DEGENERATION OF LUMBAR OR LUMBOSACRAL INTERVERTEBRAL DISC: ICD-10-CM

## 2021-04-16 DIAGNOSIS — M54.42 CHRONIC LEFT-SIDED LOW BACK PAIN WITH LEFT-SIDED SCIATICA: ICD-10-CM

## 2021-04-16 PROCEDURE — G0463 HOSPITAL OUTPT CLINIC VISIT: HCPCS

## 2021-04-16 PROCEDURE — 99213 OFFICE O/P EST LOW 20 MIN: CPT | Performed by: FAMILY MEDICINE

## 2021-04-16 RX ORDER — DULOXETIN HYDROCHLORIDE 60 MG/1
60 CAPSULE, DELAYED RELEASE ORAL DAILY
COMMUNITY
Start: 2021-04-16 | End: 2021-06-24

## 2021-04-16 ASSESSMENT — PAIN SCALES - GENERAL: PAINLEVEL: NO PAIN (1)

## 2021-04-16 NOTE — NURSING NOTE
"Chief Complaint   Patient presents with     Musculoskeletal Problem       Initial /84 (BP Location: Left arm, Patient Position: Chair, Cuff Size: Adult Large)   Pulse 76   Temp 98  F (36.7  C) (Tympanic)   Wt 110.7 kg (244 lb)   SpO2 97%   BMI 35.01 kg/m   Estimated body mass index is 35.01 kg/m  as calculated from the following:    Height as of 2/11/21: 1.778 m (5' 10\").    Weight as of this encounter: 110.7 kg (244 lb).  Medication Reconciliation: complete  MARIXA BURRIS LPN  "

## 2021-04-16 NOTE — PATIENT INSTRUCTIONS
Stop Norco.  Taken off active med list.  Can check with pharmacy on turning medications in.  Police department as well.    Continue Cymbalta at reduced dose of 60 mg once per day.  Consider going to 30 mg daily for a few weeks prior to stopping.  Call for script for this when ready.  Check with your back surgeon this month as well.    Continue Lyrica as is for now.    If dizziness happening - check blood pressure - is it low??

## 2021-04-29 ENCOUNTER — TRANSFERRED RECORDS (OUTPATIENT)
Dept: HEALTH INFORMATION MANAGEMENT | Facility: CLINIC | Age: 81
End: 2021-04-29

## 2021-05-18 DIAGNOSIS — G89.29 CHRONIC LEFT-SIDED LOW BACK PAIN WITH LEFT-SIDED SCIATICA: ICD-10-CM

## 2021-05-18 DIAGNOSIS — M54.42 CHRONIC LEFT-SIDED LOW BACK PAIN WITH LEFT-SIDED SCIATICA: ICD-10-CM

## 2021-05-18 RX ORDER — PREGABALIN 150 MG/1
CAPSULE ORAL
Qty: 90 CAPSULE | Refills: 0 | Status: SHIPPED | OUTPATIENT
Start: 2021-05-18 | End: 2021-07-14

## 2021-05-18 NOTE — TELEPHONE ENCOUNTER
lyrica      Last Written Prescription Date:  4/5/21  Last Fill Quantity: 90,   # refills: 0  Last Office Visit: 4/16/21  Future Office visit:

## 2021-06-03 ENCOUNTER — TELEPHONE (OUTPATIENT)
Dept: FAMILY MEDICINE | Facility: OTHER | Age: 81
End: 2021-06-03

## 2021-06-03 DIAGNOSIS — I10 ESSENTIAL HYPERTENSION: Primary | ICD-10-CM

## 2021-06-03 RX ORDER — LOSARTAN POTASSIUM 25 MG/1
25 TABLET ORAL DAILY
Qty: 30 TABLET | Refills: 1 | Status: SHIPPED | OUTPATIENT
Start: 2021-06-03 | End: 2021-06-24

## 2021-06-03 NOTE — TELEPHONE ENCOUNTER
Patient's spouse calling in regards to medication lisinopril. States dose was increased to 20 mg and patient has had a dry cough for over a month. She is requesting a new medication to be sent to [facility]. Please advise, thank you.    Patient's phone number is [phone]

## 2021-06-03 NOTE — TELEPHONE ENCOUNTER
Can stop Lisinopril 20 mg daily.  Start Losartan 25 mg daily.  Dosing is different with this med than with the Lisinopril.  Follow up in office in 2-3 weeks after starting new medication to see if dose adequate or adjustment needed.  Continue home readings as well.

## 2021-06-17 NOTE — PROGRESS NOTES
Assessment & Plan     Essential hypertension  Close to goal here, but mildly elevated at home.  Tolerating Losartan.  Increase to 50 mg.  - losartan (COZAAR) 50 MG tablet; Take 1 tablet (50 mg) by mouth daily    Chronic pain syndrome  Doing very well overall.  Off Norco, off Cymbalta, down on Lyrica.  Consider further taper.    H/O lumbosacral spine surgery  Happy with results.  Has follow up with Dr Brown 8/2/21.  - XR Lumbar Spine 2/3 Views; Future    Hip pain, left  Bilateral, but left worse.   Obtain xray - evaluate for arthritis vs bursitis.   Consider diagnostic/therapeutic injection.  - XR Hip Left 2-3 Views; Future    Chronic left-sided low back pain with left-sided sciatica  As above.        Patient Instructions   Will call with xray results.  Will sent lumbar xray to Dr Brown for 8/2/21 appointment.    Increase Losartan/Cozaar to 50 mg daily (can take 2 of your 25 mg tabs until gone).  Update me with readings in 2 weeks, sooner if any concerns.  50 mg tab sent for next refill.    Continue Lyrica twice per day until follow up with DR Brown.              Return in about 4 months (around 10/24/2021) for lipids, blood pressure, fasting labs.    Evelyn Iniguez MD  [facility] - [place] Meehan is a 81 year old who presents for the following health issues     HPI       Hypertension Follow-up    Do you check your blood pressure regularly outside of the clinic? Yes     Are you following a low salt diet? Yes    Are your blood pressures ever more than 140 on the top number (systolic) OR more   than 90 on the bottom number (diastolic), for example 140/90? Yes- it varies per patient - checks every other day.  No lows.  Average 140s/70-80s.  Lisinopril was added pre-operatively 1/2021.  Possibly pain related.  Developed cough.  Changed to Losartan a couple weeks ago via phone message.  Coughing has resolved.  No headaches, vision changes, chest pain or dyspnea.  Limited salt.    Activity is limited due to hips, feet.    Chronic Pain Follow-Up  Where in your body do you have pain? back  How has your pain affected your ability to work? Not applicable  Which of these pain treatments have you tried since your last clinic visit? Other: none  How well are you sleeping? Fair  How has your mood been since your last visit? About the same  Have you had a significant life event? No  Other aggravating factors: prolonged sitting, prolonged standing and poor posture  Taking medication as directed? Yes  Stopped [place] at last visit after having successful surgery.  Cymbalta reduced to 60 mg once daily.  Considered going down to 30 mg next.  Tapered off after last visit.  Lyrica continued.   Taking it BID instead of TID.  8/2/21 - follow up at [facility] Dr Brown.  Needs xray in July prior as it is virtual visit.  Bilateral hip pain.  Worst left.  Pain at night too.      PHQ-9 SCORE 7/25/2019 7/29/2020   PHQ-9 Total Score 1 0     ROBYN-7 SCORE 7/25/2019 7/29/2020   Total Score 0 0     No flowsheet data found.  Encounter-Level CSA:    There are no encounter-level csa.     Patient-Level CSA:    Controlled Substance Agreement - Opioid - Scan on 10/30/2020 10:59 AM: OPIOD/OPIOD PLUS CONTROLLED SUBSTANCE AGREEMENT  Controlled Substance Agreement - Opioid - Scan on 7/26/2019 12:01 PM: OPIOD/OPIOD PLUS CONTROLLED SUBSTANCE AGREEMENT         How many servings of fruits and vegetables do you eat daily?  2-3    On average, how many sweetened beverages do you drink each day (Examples: soda, juice, sweet tea, etc.  Do NOT count diet or artificially sweetened beverages)?   1/2-1 can pop    How many days per week do you exercise enough to make your heart beat faster? 3 or less    How many minutes a day do you exercise enough to make your heart beat faster? 9 or less    How many days per week do you miss taking your medication? 0      Review of Systems   Constitutional, HEENT, cardiovascular, pulmonary, gi and gu  "systems are negative, except as otherwise noted.      Objective    /62 (BP Location: Right arm, Patient Position: Sitting, Cuff Size: Adult Regular)   Pulse 72   Temp 97.8  F (36.6  C) (Tympanic)   Ht 1.778 m (5' 10\")   Wt 110.7 kg (244 lb)   SpO2 96%   BMI 35.01 kg/m    Body mass index is 35.01 kg/m .  Physical Exam   GENERAL: healthy, alert and no distress  NECK: no adenopathy, no asymmetry, masses, or scars and thyroid normal to palpation  RESP: lungs clear to auscultation - no rales, rhonchi or wheezes  CV: regular rate and rhythm, normal S1 S2, no S3 or S4, no murmur, click or rub, no peripheral edema and peripheral pulses strong  ABDOMEN: soft, nontender, no hepatosplenomegaly, no masses and bowel sounds normal  MS: normal muscle tone, peripheral pulses normal and trace lower extremity edema; back with well healed surgical scar - non-tender to palpation; SLR negative bilaterally; symmetric strength and sensation; normal gait; hips with limited ROM internal/external rotation, but good flexion; no pain with AROM; mildly tender laterally bilateral, but more posterior than typical bursitis?  SKIN: no suspicious lesions or rashes  NEURO: Normal strength and tone, mentation intact and speech normal  PSYCH: mentation appears normal, affect normal/bright    Xrays pending.            "

## 2021-06-24 ENCOUNTER — ANCILLARY PROCEDURE (OUTPATIENT)
Dept: GENERAL RADIOLOGY | Facility: OTHER | Age: 81
End: 2021-06-24
Attending: FAMILY MEDICINE
Payer: COMMERCIAL

## 2021-06-24 ENCOUNTER — OFFICE VISIT (OUTPATIENT)
Dept: FAMILY MEDICINE | Facility: OTHER | Age: 81
End: 2021-06-24
Attending: FAMILY MEDICINE
Payer: COMMERCIAL

## 2021-06-24 VITALS
HEART RATE: 72 BPM | WEIGHT: 244 LBS | OXYGEN SATURATION: 96 % | DIASTOLIC BLOOD PRESSURE: 62 MMHG | SYSTOLIC BLOOD PRESSURE: 138 MMHG | TEMPERATURE: 97.8 F | BODY MASS INDEX: 34.93 KG/M2 | HEIGHT: 70 IN

## 2021-06-24 DIAGNOSIS — M25.552 HIP PAIN, LEFT: ICD-10-CM

## 2021-06-24 DIAGNOSIS — Z98.890 H/O LUMBOSACRAL SPINE SURGERY: ICD-10-CM

## 2021-06-24 DIAGNOSIS — G89.29 CHRONIC LEFT-SIDED LOW BACK PAIN WITH LEFT-SIDED SCIATICA: ICD-10-CM

## 2021-06-24 DIAGNOSIS — I10 ESSENTIAL HYPERTENSION: Primary | ICD-10-CM

## 2021-06-24 DIAGNOSIS — G89.4 CHRONIC PAIN SYNDROME: ICD-10-CM

## 2021-06-24 DIAGNOSIS — M54.42 CHRONIC LEFT-SIDED LOW BACK PAIN WITH LEFT-SIDED SCIATICA: ICD-10-CM

## 2021-06-24 PROCEDURE — 72100 X-RAY EXAM L-S SPINE 2/3 VWS: CPT | Mod: TC

## 2021-06-24 PROCEDURE — G0463 HOSPITAL OUTPT CLINIC VISIT: HCPCS

## 2021-06-24 PROCEDURE — 73502 X-RAY EXAM HIP UNI 2-3 VIEWS: CPT | Mod: TC

## 2021-06-24 PROCEDURE — 99214 OFFICE O/P EST MOD 30 MIN: CPT | Performed by: FAMILY MEDICINE

## 2021-06-24 RX ORDER — LOSARTAN POTASSIUM 50 MG/1
50 TABLET ORAL DAILY
Qty: 90 TABLET | Refills: 1 | Status: SHIPPED | OUTPATIENT
Start: 2021-06-24 | End: 2021-10-25

## 2021-06-24 ASSESSMENT — PAIN SCALES - GENERAL: PAINLEVEL: NO PAIN (0)

## 2021-06-24 ASSESSMENT — MIFFLIN-ST. JEOR: SCORE: 1828.03

## 2021-06-24 NOTE — NURSING NOTE
"Chief Complaint   Patient presents with     Pain     Hypertension       Initial /62 (BP Location: Right arm, Patient Position: Sitting, Cuff Size: Adult Regular)   Pulse 72   Temp 97.8  F (36.6  C) (Tympanic)   Ht 1.778 m (5' 10\")   Wt 110.7 kg (244 lb)   SpO2 96%   BMI 35.01 kg/m   Estimated body mass index is 35.01 kg/m  as calculated from the following:    Height as of this encounter: 1.778 m (5' 10\").    Weight as of this encounter: 110.7 kg (244 lb).  Medication Reconciliation: complete  Erica Cox LPN  "

## 2021-06-24 NOTE — PATIENT INSTRUCTIONS
Will call with xray results.  Will sent lumbar xray to Dr Brown for 8/2/21 appointment.    Increase Losartan/Cozaar to 50 mg daily (can take 2 of your 25 mg tabs until gone).  Update me with readings in 2 weeks, sooner if any concerns.  50 mg tab sent for next refill.    Continue Lyrica twice per day until follow up with DR Brown.

## 2021-07-13 DIAGNOSIS — E78.5 HYPERLIPIDEMIA, UNSPECIFIED HYPERLIPIDEMIA TYPE: ICD-10-CM

## 2021-07-13 DIAGNOSIS — G89.29 CHRONIC LEFT-SIDED LOW BACK PAIN WITH LEFT-SIDED SCIATICA: ICD-10-CM

## 2021-07-13 DIAGNOSIS — M54.42 CHRONIC LEFT-SIDED LOW BACK PAIN WITH LEFT-SIDED SCIATICA: ICD-10-CM

## 2021-07-14 RX ORDER — ATORVASTATIN CALCIUM 80 MG/1
TABLET, FILM COATED ORAL
Qty: 90 TABLET | Refills: 0 | Status: SHIPPED | OUTPATIENT
Start: 2021-07-14 | End: 2021-10-25

## 2021-07-14 RX ORDER — PREGABALIN 150 MG/1
150 CAPSULE ORAL 2 TIMES DAILY
Qty: 60 CAPSULE | Refills: 0 | Status: SHIPPED | OUTPATIENT
Start: 2021-07-14 | End: 2021-09-03

## 2021-07-14 NOTE — TELEPHONE ENCOUNTER
Lipitor      Last Written Prescription Date:  4/5/21  Last Fill Quantity: 90,   # refills: 0  Last Office Visit: 6/24/21  Future Office visit:       Routing refill request to provider for review/approval because:      Lyrica      Last Written Prescription Date:  5/18/21  Last Fill Quantity: 90,   # refills: 0  Last Office Visit: 6/24/21  Future Office visit:       Routing refill request to provider for review/approval because:

## 2021-08-02 ENCOUNTER — TRANSFERRED RECORDS (OUTPATIENT)
Dept: HEALTH INFORMATION MANAGEMENT | Facility: CLINIC | Age: 81
End: 2021-08-02

## 2021-08-12 ENCOUNTER — TELEPHONE (OUTPATIENT)
Dept: FAMILY MEDICINE | Facility: OTHER | Age: 81
End: 2021-08-12

## 2021-08-12 DIAGNOSIS — I10 ESSENTIAL HYPERTENSION: Primary | ICD-10-CM

## 2021-08-12 NOTE — TELEPHONE ENCOUNTER
Pt calling stating he has been taking the losartan and Bps are still reading 150/80s-160/80s. Is also in the process of tapering off the Lyrica per surgeons recommendation if that has anything to do with it. Wondering what should be done if anything per pt r/t BP med  Please advise any further directive.  Erica Cox LPN

## 2021-08-12 NOTE — TELEPHONE ENCOUNTER
Prior intolerance to Lisinopril.  Changed to Losartan 25 mg 6/2021.    Increased to 50 mg.  Could increase to max of 100 mg daily.  However, if not responding, may need to change medications.  Please schedule follow up.

## 2021-08-13 RX ORDER — LOSARTAN POTASSIUM 100 MG/1
100 TABLET ORAL DAILY
Qty: 30 TABLET | Refills: 1 | Status: SHIPPED | OUTPATIENT
Start: 2021-08-13 | End: 2021-09-03

## 2021-08-13 NOTE — TELEPHONE ENCOUNTER
Spoke with patient and he is willing to increase to MAX dose of 100mg. Did you want to increase until patients scheduled appointment on 9/3/21? Order pended if so, if not disregard order.   Erica Cox LPN

## 2021-08-31 NOTE — PROGRESS NOTES
"    Assessment & Plan     Essential hypertension  Not at goal.  Losartan seemed to help initially but increase to 100 mg didn't make a difference.  Home readings not a goal, but much better than here.  Will go back to 50 mg of Losartan.    Adding second agent - discussed diuretic vs Norvasc - patient preferred to avoid diuretic.  5 mg Norvasc added.  Counseled to monitor for pedal edema.  Continue home readings and update via [employer] in 2 weeks.  Physical in 10/2021 already scheduled.  - Basic metabolic panel; Future  - amLODIPine (NORVASC) 5 MG tablet; Take 1 tablet (5 mg) by mouth daily  - Basic metabolic panel    DDD (degenerative disc disease), lumbar  Back doing well.  Some residual extremity symptoms.  Noticed once full off the Lyrica.    Will re introduce at low dose and reassess.  - pregabalin (LYRICA) 75 MG capsule; Take 1 capsule (75 mg) by mouth 2 times daily    Neuropathy  As above.  - pregabalin (LYRICA) 75 MG capsule; Take 1 capsule (75 mg) by mouth 2 times daily       BMI:   Estimated body mass index is 34.98 kg/m  as calculated from the following:    Height as of 6/24/21: 1.778 m (5' 10\").    Weight as of this encounter: 110.6 kg (243 lb 12.8 oz).   Weight management plan: Discussed healthy diet and exercise guidelines    Patient Instructions   Reduce Losartan/cozaar back to 50 mg daily.      Add norvasc/amlodipine 5 mg daily.  Monitor for any swelling in feet/legs.    Lab today - will call with results.    Add back Lyrica at lower dose to start - 75 mg twice daily.    Update via [employer] with BP readings in 1-2 weeks.  Recheck in office - as scheduled 10/2021.      Return in about 1 month (around 10/3/2021).    Evelyn Iniguez MD  [facility] - FADIA Meehan is a 81 year old who presents for the following health issues     HPI     Hypertension Follow-up - Losartan increased to 50 mg last visit 6/2021 - then to 100 mg via phone 8/12/21.      Do you check your blood " "pressure regularly outside of the clinic? Yes     Are you following a low salt diet? Yes    Are your blood pressures ever more than 140 on the top number (systolic) OR more   than 90 on the bottom number (diastolic), for example 140/90? Yes      How many servings of fruits and vegetables do you eat daily?  2-3    On average, how many sweetened beverages do you drink each day (Examples: soda, juice, sweet tea, etc.  Do NOT count diet or artificially sweetened beverages)?   0    How many days per week do you exercise enough to make your heart beat faster? 3 or less    How many minutes a day do you exercise enough to make your heart beat faster? 9 or less    How many days per week do you miss taking your medication? 0    Prior Lisinopril.  Home readings 140s/70s.  Swelling in right ankle.  Comes on as day progresses.    Weaned off Lyrica.  Off now x 1 month  Right leg feels \"antsy\".  Big toe on right foot = since surgery - hurts, 1/2 numb.  Normal strength.  Back is good.  Hip pain - right more than left.  Left shoulder.  Prior Cymbalta - no change on/off.    Review of Systems   Constitutional, HEENT, cardiovascular, pulmonary, gi and gu systems are negative, except as otherwise noted.      Objective    BP (!) 166/90   Pulse 74   Temp 97.9  F (36.6  C)   Resp 18   Wt 110.6 kg (243 lb 12.8 oz)   SpO2 96%   BMI 34.98 kg/m    Body mass index is 34.98 kg/m .  Physical Exam   GENERAL: alert, no distress and over weight  NECK: no adenopathy, no asymmetry, masses, or scars and thyroid normal to palpation  RESP: lungs clear to auscultation - no rales, rhonchi or wheezes  CV: regular rate and rhythm, normal S1 S2, no S3 or S4, no murmur, click or rub, no peripheral edema and peripheral pulses strong  ABDOMEN: soft, nontender, no hepatosplenomegaly, no masses and bowel sounds normal  MS: normal muscle tone, peripheral pulses normal and trace ankle edema; surgical scar lumbar spine non-tender; negative SLR bilaterally; " bilateral hips with fair AROM with minimal discomfort  SKIN: no suspicious lesions or rashes  NEURO: Normal strength and tone, mentation intact and speech normal  PSYCH: mentation appears normal, affect normal/bright    Lab pending.

## 2021-09-03 ENCOUNTER — OFFICE VISIT (OUTPATIENT)
Dept: FAMILY MEDICINE | Facility: OTHER | Age: 81
End: 2021-09-03
Attending: FAMILY MEDICINE
Payer: COMMERCIAL

## 2021-09-03 VITALS
HEART RATE: 74 BPM | RESPIRATION RATE: 18 BRPM | DIASTOLIC BLOOD PRESSURE: 90 MMHG | SYSTOLIC BLOOD PRESSURE: 170 MMHG | TEMPERATURE: 97.9 F | BODY MASS INDEX: 34.98 KG/M2 | WEIGHT: 243.8 LBS | OXYGEN SATURATION: 96 %

## 2021-09-03 DIAGNOSIS — G62.9 NEUROPATHY: ICD-10-CM

## 2021-09-03 DIAGNOSIS — I10 ESSENTIAL HYPERTENSION: Primary | ICD-10-CM

## 2021-09-03 DIAGNOSIS — M51.369 DDD (DEGENERATIVE DISC DISEASE), LUMBAR: ICD-10-CM

## 2021-09-03 LAB
ANION GAP SERPL CALCULATED.3IONS-SCNC: 5 MMOL/L (ref 3–14)
BUN SERPL-MCNC: 16 MG/DL (ref 7–30)
CALCIUM SERPL-MCNC: 9.6 MG/DL (ref 8.5–10.1)
CHLORIDE BLD-SCNC: 108 MMOL/L (ref 94–109)
CO2 SERPL-SCNC: 26 MMOL/L (ref 20–32)
CREAT SERPL-MCNC: 1.09 MG/DL (ref 0.66–1.25)
GFR SERPL CREATININE-BSD FRML MDRD: 64 ML/MIN/1.73M2
GLUCOSE BLD-MCNC: 90 MG/DL (ref 70–99)
POTASSIUM BLD-SCNC: 4.4 MMOL/L (ref 3.4–5.3)
SODIUM SERPL-SCNC: 139 MMOL/L (ref 133–144)

## 2021-09-03 PROCEDURE — 80048 BASIC METABOLIC PNL TOTAL CA: CPT | Mod: ZL | Performed by: FAMILY MEDICINE

## 2021-09-03 PROCEDURE — 36415 COLL VENOUS BLD VENIPUNCTURE: CPT | Mod: ZL | Performed by: FAMILY MEDICINE

## 2021-09-03 PROCEDURE — G0463 HOSPITAL OUTPT CLINIC VISIT: HCPCS

## 2021-09-03 PROCEDURE — 99214 OFFICE O/P EST MOD 30 MIN: CPT | Performed by: FAMILY MEDICINE

## 2021-09-03 RX ORDER — PREGABALIN 75 MG/1
75 CAPSULE ORAL 2 TIMES DAILY
Qty: 60 CAPSULE | Refills: 1 | Status: SHIPPED | OUTPATIENT
Start: 2021-09-03 | End: 2021-10-25

## 2021-09-03 RX ORDER — AMLODIPINE BESYLATE 5 MG/1
5 TABLET ORAL DAILY
Qty: 30 TABLET | Refills: 1 | Status: SHIPPED | OUTPATIENT
Start: 2021-09-03 | End: 2021-09-20

## 2021-09-03 ASSESSMENT — ANXIETY QUESTIONNAIRES
3. WORRYING TOO MUCH ABOUT DIFFERENT THINGS: NOT AT ALL
1. FEELING NERVOUS, ANXIOUS, OR ON EDGE: NOT AT ALL
2. NOT BEING ABLE TO STOP OR CONTROL WORRYING: NOT AT ALL
GAD7 TOTAL SCORE: 1
4. TROUBLE RELAXING: NOT AT ALL
6. BECOMING EASILY ANNOYED OR IRRITABLE: SEVERAL DAYS
IF YOU CHECKED OFF ANY PROBLEMS ON THIS QUESTIONNAIRE, HOW DIFFICULT HAVE THESE PROBLEMS MADE IT FOR YOU TO DO YOUR WORK, TAKE CARE OF THINGS AT HOME, OR GET ALONG WITH OTHER PEOPLE: NOT DIFFICULT AT ALL
5. BEING SO RESTLESS THAT IT IS HARD TO SIT STILL: NOT AT ALL
7. FEELING AFRAID AS IF SOMETHING AWFUL MIGHT HAPPEN: NOT AT ALL

## 2021-09-03 ASSESSMENT — PAIN SCALES - GENERAL: PAINLEVEL: SEVERE PAIN (7)

## 2021-09-03 ASSESSMENT — PATIENT HEALTH QUESTIONNAIRE - PHQ9: SUM OF ALL RESPONSES TO PHQ QUESTIONS 1-9: 5

## 2021-09-03 NOTE — NURSING NOTE
"Chief Complaint   Patient presents with     Hypertension       Initial BP (!) 170/90   Pulse 74   Temp 97.9  F (36.6  C)   Resp 18   Wt 110.6 kg (243 lb 12.8 oz)   SpO2 96%   BMI 34.98 kg/m   Estimated body mass index is 34.98 kg/m  as calculated from the following:    Height as of 6/24/21: 1.778 m (5' 10\").    Weight as of this encounter: 110.6 kg (243 lb 12.8 oz).  Medication Reconciliation: margaret Gleason  "

## 2021-09-03 NOTE — PATIENT INSTRUCTIONS
Reduce Losartan/cozaar back to 50 mg daily.      Add norvasc/amlodipine 5 mg daily.  Monitor for any swelling in feet/legs.    Lab today - will call with results.    Add back Lyrica at lower dose to start - 75 mg twice daily.    Update via [employer] with BP readings in 1-2 weeks.  Recheck in office - as scheduled 10/2021.

## 2021-09-03 NOTE — NURSING NOTE
"Chief Complaint   Patient presents with     Hypertension       Initial BP (!) 166/90   Pulse 74   Temp 97.9  F (36.6  C)   Resp 18   Wt 110.6 kg (243 lb 12.8 oz)   SpO2 96%   BMI 34.98 kg/m   Estimated body mass index is 34.98 kg/m  as calculated from the following:    Height as of 6/24/21: 1.778 m (5' 10\").    Weight as of this encounter: 110.6 kg (243 lb 12.8 oz).  Medication Reconciliation: margaret Gleason  "

## 2021-09-04 ASSESSMENT — ANXIETY QUESTIONNAIRES: GAD7 TOTAL SCORE: 1

## 2021-09-17 ENCOUNTER — TELEPHONE (OUTPATIENT)
Dept: FAMILY MEDICINE | Facility: OTHER | Age: 81
End: 2021-09-17

## 2021-09-17 DIAGNOSIS — I10 ESSENTIAL HYPERTENSION: ICD-10-CM

## 2021-09-17 NOTE — TELEPHONE ENCOUNTER
Patient called with his BP readings that he was monitoring at home.      9/5  169/80   9/6 149/71  9/7 139/76   9/8 141/75   9/9 155/79   9/10 135/77   9/11  139/77  9/12  136/80  9/13  136/68  9/14 142/84   9/15 152/71

## 2021-09-17 NOTE — TELEPHONE ENCOUNTER
Would advise increasing Norvasc to 10 mg daily.   Monitor for any developing ankle swelling.  Update again in 2 weeks, sooner if concerns.

## 2021-09-20 RX ORDER — AMLODIPINE BESYLATE 10 MG/1
10 TABLET ORAL DAILY
Qty: 90 TABLET | Refills: 1 | Status: SHIPPED | OUTPATIENT
Start: 2021-09-20 | End: 2021-11-08

## 2021-09-28 ENCOUNTER — NURSE TRIAGE (OUTPATIENT)
Dept: FAMILY MEDICINE | Facility: OTHER | Age: 81
End: 2021-09-28

## 2021-09-28 ENCOUNTER — TELEPHONE (OUTPATIENT)
Dept: FAMILY MEDICINE | Facility: OTHER | Age: 81
End: 2021-09-28

## 2021-09-28 NOTE — TELEPHONE ENCOUNTER
Patient called with his BP report.  States his wife thinks his feet and ankles are a little puffy.      Sept. BP  18  145/69    19  129/69    20  132/71    21  121/72    22 127/65    23 137/73     24 136/73       25  140/69       26 141/69

## 2021-09-29 NOTE — TELEPHONE ENCOUNTER
BP readings appear much better on 10 mg Norvasc.  Continue at that dose.  Continue to monitor BP.  If swelling worsening, please update me.

## 2021-10-20 NOTE — PROGRESS NOTES
Assessment & Plan     Essential hypertension  At goal in office. Home readings overall improved - over 1/2 at goal.  Does have some edema - but patient reports it prior to his Norvasc increase.  See below.  - Comprehensive metabolic panel; Future  - CBC with Platelets & Differential; Future  - losartan (COZAAR) 50 MG tablet; Take 1 tablet (50 mg) by mouth daily    Chronic left-sided low back pain with left-sided sciatica  Continue Lyrica.  Contract and UDS updated.  Desires to continue.  - Drug Confirmation Panel Urine with Creat; Future    DDD (degenerative disc disease), lumbar  As above.  - Drug Confirmation Panel Urine with Creat; Future  - pregabalin (LYRICA) 75 MG capsule; Take 1 capsule (75 mg) by mouth 2 times daily    Hyperlipidemia, unspecified hyperlipidemia type  Labs pending.  On high dose statin.  - Comprehensive metabolic panel; Future  - Lipid Profile; Future  - atorvastatin (LIPITOR) 80 MG tablet; Take 1 tablet (80 mg) by mouth daily    Chronic pain syndrome  As above.  - Drug Confirmation Panel Urine with Creat; Future    Elevated fasting glucose  a1c pending.  - Hemoglobin A1c; Future    Encounter for hepatitis C screening test for low risk patient  - Hepatitis C Screen Reflex to HCV RNA Quant and Genotype; Future    Neuropathy  Improved with Lyrica.  - pregabalin (LYRICA) 75 MG capsule; Take 1 capsule (75 mg) by mouth 2 times daily    Hip pain, right  As above - prior xray left negative.  Xray right may also be negative, and patient interested in injection if appropriate.  Proceed with MRI.  - MR Hip Right w/o Contrast; Future    Edema, unspecified type  As above - may be exacerbated/affected by his Norvasc.   Asymptomatic to patient.  Start with compression.   Consider Lasix vs switching anti hypertensives.             Patient Instructions   Will call with lab results.  Medications refilled.  Some swelling/edema of legs - may be related to your Norvasc.    Suggest compression stockings - knee  "high.  Consider low dose Lasix water pill if bothersome or worsening.  Continue monitoring BP couple times per week.  MRI right hip ordered - depending on results - can set up for injection if appropriate.      Return in about 3 months (around 1/25/2022) for chronic pain, BP Recheck.    Evelyn Iniguez MD  [facility] - FADIA Meehan is a 81 year old who presents for the following health issues     HPI     Treatment agreement -and UDS - both due    Flu shot - already had 9/2021.    Hyperlipidemia Follow-Up - on Lipitor    Are you regularly taking any medication or supplement to lower your cholesterol?   Yes- lipitor    Are you having muscle aches or other side effects that you think could be caused by your cholesterol lowering medication?  No    Hypertension Follow-up - on Norvasc 10 (new and titrated) and Cozaar 50    Do you check your blood pressure regularly outside of the clinic? Yes     Are you following a low salt diet? Yes    Are your blood pressures ever more than 140 on the top number (systolic) OR more   than 90 on the bottom number (diastolic), for example 140/90? Yes   No side effects.  No dizziness/light headed.  Home readings:  145/69  129/69  132/71  127/65  137/73  136/73  140/69  141/69  141/81  139/74  143/75  132/71  127/66    Pain History:  When did you first notice your pain? - More than 6 weeks   Have you seen this provider for your pain in the past?   Yes   Where in your body do you have pain? hips  Are you seeing anyone else for your pain? No    PHQ-9 SCORE 7/25/2019 7/29/2020 9/3/2021   PHQ-9 Total Score 1 0 5       ROBYN-7 SCORE 7/25/2019 7/29/2020 9/3/2021   Total Score 0 0 1         PEG Score 10/25/2021   PEG Total Score 7       Chronic Pain Follow Up:  Location of pain: hips  Analgesia/pain control:    - Recent changes:  none    - Overall control: Tolerable with discomfort    - Current treatments:  Lyrica restarted 9/3/2021 - does help; no \"crawling feeling " "in legs\"  Adherence:     - Do you ever take more pain medicine than prescribed? No    - When did you take your last dose of pain medicine?  This morning   Adverse effects: No   PDMP Review       Value Time User    State PDMP site checked  Yes 10/24/2021  4:52 PM Evelyn Olson MD        Last CSA Agreement:   Patient-Level CSA:    Controlled Substance Agreement - Opioid - Scan on 10/30/2020 10:59 AM: OPIOD/OPIOD PLUS CONTROLLED SUBSTANCE AGREEMENT  Controlled Substance Agreement - Opioid - Scan on 7/26/2019 12:01 PM: OPIOD/OPIOD PLUS CONTROLLED SUBSTANCE AGREEMENT       Last UDS: 7/31/2020    Right hip seems worse than left.  \"muscles sore\".  Points laterally.  Xray left hip negative recently 6/2021.  Hip pain was present prior to back surgery.    No groin pain.  Right lower leg and great toe.  Same/stable since surgery.  Review of Systems   Constitutional, HEENT, cardiovascular, pulmonary, gi and gu systems are negative, except as otherwise noted.      Objective    /58   Pulse 73   Temp 98.2  F (36.8  C)   Ht 1.74 m (5' 8.5\")   Wt 110.7 kg (244 lb)   SpO2 98%   BMI 36.56 kg/m    Body mass index is 36.56 kg/m .  Physical Exam   GENERAL: alert, no distress and over weight  NECK: no adenopathy, no asymmetry, masses, or scars and thyroid normal to palpation  RESP: lungs clear to auscultation - no rales, rhonchi or wheezes  CV: regular rate and rhythm, normal S1 S2, no S3 or S4, no murmur, click or rub, no peripheral edema and peripheral pulses strong  ABDOMEN: soft, nontender, no hepatosplenomegaly, no masses and bowel sounds normal  MS: normal muscle tone, 1+ edema to bilateral lower legs/ankles and non-tender throughout back/spine; negative SLR bilaterally; limited ROM with hips bilaterally in all directions - but does not provoke pain  SKIN: no suspicious lesions or rashes  NEURO: Normal strength and tone, mentation intact and speech normal  PSYCH: mentation appears normal, affect normal/bright    No " results found for this or any previous visit (from the past 24 hour(s)).

## 2021-10-25 ENCOUNTER — LAB (OUTPATIENT)
Dept: LAB | Facility: OTHER | Age: 81
End: 2021-10-25
Attending: FAMILY MEDICINE
Payer: COMMERCIAL

## 2021-10-25 ENCOUNTER — OFFICE VISIT (OUTPATIENT)
Dept: FAMILY MEDICINE | Facility: OTHER | Age: 81
End: 2021-10-25
Attending: FAMILY MEDICINE
Payer: COMMERCIAL

## 2021-10-25 VITALS
TEMPERATURE: 98.2 F | BODY MASS INDEX: 36.14 KG/M2 | HEIGHT: 69 IN | DIASTOLIC BLOOD PRESSURE: 58 MMHG | OXYGEN SATURATION: 98 % | HEART RATE: 73 BPM | SYSTOLIC BLOOD PRESSURE: 130 MMHG | WEIGHT: 244 LBS

## 2021-10-25 DIAGNOSIS — M16.10 HIP ARTHRITIS: ICD-10-CM

## 2021-10-25 DIAGNOSIS — G89.29 CHRONIC LEFT-SIDED LOW BACK PAIN WITH LEFT-SIDED SCIATICA: ICD-10-CM

## 2021-10-25 DIAGNOSIS — R73.01 ELEVATED FASTING GLUCOSE: ICD-10-CM

## 2021-10-25 DIAGNOSIS — R60.9 EDEMA, UNSPECIFIED TYPE: ICD-10-CM

## 2021-10-25 DIAGNOSIS — G62.9 NEUROPATHY: ICD-10-CM

## 2021-10-25 DIAGNOSIS — M51.369 DDD (DEGENERATIVE DISC DISEASE), LUMBAR: ICD-10-CM

## 2021-10-25 DIAGNOSIS — E78.5 HYPERLIPIDEMIA, UNSPECIFIED HYPERLIPIDEMIA TYPE: ICD-10-CM

## 2021-10-25 DIAGNOSIS — M25.551 HIP PAIN, RIGHT: ICD-10-CM

## 2021-10-25 DIAGNOSIS — Z11.59 ENCOUNTER FOR HEPATITIS C SCREENING TEST FOR LOW RISK PATIENT: ICD-10-CM

## 2021-10-25 DIAGNOSIS — G89.4 CHRONIC PAIN SYNDROME: ICD-10-CM

## 2021-10-25 DIAGNOSIS — I10 ESSENTIAL HYPERTENSION: ICD-10-CM

## 2021-10-25 DIAGNOSIS — I10 ESSENTIAL HYPERTENSION: Primary | ICD-10-CM

## 2021-10-25 DIAGNOSIS — M54.42 CHRONIC LEFT-SIDED LOW BACK PAIN WITH LEFT-SIDED SCIATICA: ICD-10-CM

## 2021-10-25 DIAGNOSIS — S73.191A TEAR OF RIGHT ACETABULAR LABRUM, INITIAL ENCOUNTER: ICD-10-CM

## 2021-10-25 LAB
ALBUMIN SERPL-MCNC: 3.6 G/DL (ref 3.4–5)
ALP SERPL-CCNC: 86 U/L (ref 40–150)
ALT SERPL W P-5'-P-CCNC: 22 U/L (ref 0–70)
ANION GAP SERPL CALCULATED.3IONS-SCNC: 1 MMOL/L (ref 3–14)
AST SERPL W P-5'-P-CCNC: 18 U/L (ref 0–45)
BASOPHILS # BLD AUTO: 0.1 10E3/UL (ref 0–0.2)
BASOPHILS NFR BLD AUTO: 2 %
BILIRUB SERPL-MCNC: 0.4 MG/DL (ref 0.2–1.3)
BUN SERPL-MCNC: 16 MG/DL (ref 7–30)
CALCIUM SERPL-MCNC: 9.2 MG/DL (ref 8.5–10.1)
CHLORIDE BLD-SCNC: 110 MMOL/L (ref 94–109)
CHOLEST SERPL-MCNC: 139 MG/DL
CO2 SERPL-SCNC: 29 MMOL/L (ref 20–32)
CREAT SERPL-MCNC: 1.07 MG/DL (ref 0.66–1.25)
CREAT UR-MCNC: 154 MG/DL
EOSINOPHIL # BLD AUTO: 0.2 10E3/UL (ref 0–0.7)
EOSINOPHIL NFR BLD AUTO: 3 %
ERYTHROCYTE [DISTWIDTH] IN BLOOD BY AUTOMATED COUNT: 13.4 % (ref 10–15)
EST. AVERAGE GLUCOSE BLD GHB EST-MCNC: 126 MG/DL
FASTING STATUS PATIENT QL REPORTED: YES
GFR SERPL CREATININE-BSD FRML MDRD: 66 ML/MIN/1.73M2
GLUCOSE BLD-MCNC: 99 MG/DL (ref 70–99)
HBA1C MFR BLD: 6 % (ref 0–5.6)
HCT VFR BLD AUTO: 43.6 % (ref 40–53)
HDLC SERPL-MCNC: 32 MG/DL
HGB BLD-MCNC: 14.2 G/DL (ref 13.3–17.7)
IMM GRANULOCYTES # BLD: 0 10E3/UL
IMM GRANULOCYTES NFR BLD: 0 %
LDLC SERPL CALC-MCNC: 55 MG/DL
LYMPHOCYTES # BLD AUTO: 1.9 10E3/UL (ref 0.8–5.3)
LYMPHOCYTES NFR BLD AUTO: 35 %
MCH RBC QN AUTO: 29.4 PG (ref 26.5–33)
MCHC RBC AUTO-ENTMCNC: 32.6 G/DL (ref 31.5–36.5)
MCV RBC AUTO: 90 FL (ref 78–100)
MONOCYTES # BLD AUTO: 0.5 10E3/UL (ref 0–1.3)
MONOCYTES NFR BLD AUTO: 10 %
NEUTROPHILS # BLD AUTO: 2.7 10E3/UL (ref 1.6–8.3)
NEUTROPHILS NFR BLD AUTO: 50 %
NONHDLC SERPL-MCNC: 107 MG/DL
NRBC # BLD AUTO: 0 10E3/UL
NRBC BLD AUTO-RTO: 0 /100
PLATELET # BLD AUTO: 244 10E3/UL (ref 150–450)
POTASSIUM BLD-SCNC: 4.1 MMOL/L (ref 3.4–5.3)
PROT SERPL-MCNC: 7.3 G/DL (ref 6.8–8.8)
RBC # BLD AUTO: 4.83 10E6/UL (ref 4.4–5.9)
SODIUM SERPL-SCNC: 140 MMOL/L (ref 133–144)
TRIGL SERPL-MCNC: 258 MG/DL
WBC # BLD AUTO: 5.4 10E3/UL (ref 4–11)

## 2021-10-25 PROCEDURE — 82040 ASSAY OF SERUM ALBUMIN: CPT | Mod: ZL

## 2021-10-25 PROCEDURE — 36415 COLL VENOUS BLD VENIPUNCTURE: CPT | Mod: ZL

## 2021-10-25 PROCEDURE — 86803 HEPATITIS C AB TEST: CPT | Mod: ZL

## 2021-10-25 PROCEDURE — 85025 COMPLETE CBC W/AUTO DIFF WBC: CPT | Mod: ZL

## 2021-10-25 PROCEDURE — 80307 DRUG TEST PRSMV CHEM ANLYZR: CPT | Mod: ZL

## 2021-10-25 PROCEDURE — 99214 OFFICE O/P EST MOD 30 MIN: CPT | Performed by: FAMILY MEDICINE

## 2021-10-25 PROCEDURE — 82570 ASSAY OF URINE CREATININE: CPT | Mod: ZL

## 2021-10-25 PROCEDURE — 82465 ASSAY BLD/SERUM CHOLESTEROL: CPT | Mod: ZL

## 2021-10-25 PROCEDURE — 83036 HEMOGLOBIN GLYCOSYLATED A1C: CPT | Mod: ZL

## 2021-10-25 PROCEDURE — G0463 HOSPITAL OUTPT CLINIC VISIT: HCPCS

## 2021-10-25 RX ORDER — LOSARTAN POTASSIUM 50 MG/1
50 TABLET ORAL DAILY
Qty: 90 TABLET | Refills: 1 | Status: SHIPPED | OUTPATIENT
Start: 2021-10-25 | End: 2022-04-20

## 2021-10-25 RX ORDER — PREGABALIN 75 MG/1
75 CAPSULE ORAL 2 TIMES DAILY
Qty: 60 CAPSULE | Refills: 1 | Status: CANCELLED | OUTPATIENT
Start: 2021-10-25

## 2021-10-25 RX ORDER — ATORVASTATIN CALCIUM 80 MG/1
80 TABLET, FILM COATED ORAL DAILY
Qty: 90 TABLET | Refills: 3 | Status: SHIPPED | OUTPATIENT
Start: 2021-10-25 | End: 2022-11-28

## 2021-10-25 RX ORDER — PREGABALIN 75 MG/1
75 CAPSULE ORAL 2 TIMES DAILY
Qty: 180 CAPSULE | Refills: 1 | Status: SHIPPED | OUTPATIENT
Start: 2021-10-25 | End: 2022-03-18

## 2021-10-25 ASSESSMENT — PAIN SCALES - GENERAL: PAINLEVEL: MILD PAIN (3)

## 2021-10-25 ASSESSMENT — MIFFLIN-ST. JEOR: SCORE: 1804.22

## 2021-10-25 NOTE — PATIENT INSTRUCTIONS
Will call with lab results.  Medications refilled.  Some swelling/edema of legs - may be related to your Norvasc.    Suggest compression stockings - knee high.  Consider low dose Lasix water pill if bothersome or worsening.  Continue monitoring BP couple times per week.  MRI right hip ordered - depending on results - can set up for injection if appropriate.

## 2021-10-25 NOTE — LETTER
Opioid / Opioid Plus Controlled Substance Agreement    This is an agreement between you and your provider about the safe and appropriate use of controlled substance/opioids prescribed by your care team. Controlled substances are medicines that can cause physical and mental dependence (abuse).    There are strict laws about having and using these medicines. We here at [facility] are committing to working with you in your efforts to get better. To support you in this work, we ll help you schedule regular office appointments for medicine refills. If we must cancel or change your appointment for any reason, we ll make sure you have enough medicine to last until your next appointment.     As a Provider, I will:    Listen carefully to your concerns and treat you with respect.     Recommend a treatment plan that I believe is in your best interest. This plan may involve therapies other than opioid pain medication.     Talk with you often about the possible benefits, and the risk of harm of any medicine that we prescribe for you.     Provide a plan on how to taper (discontinue or go off) using this medicine if the decision is made to stop its use.    As a Patient, I understand that opioid(s):     Are a controlled substance prescribed by my care team to help me function or work and manage my condition(s).     Are strong medicines and can cause serious side effects such as:    Drowsiness, which can seriously affect my driving ability    A lower breathing rate, enough to cause death    Harm to my thinking ability     Depression     Abuse of and addiction to this medicine    Need to be taken exactly as prescribed. Combining opioids with certain medicines or chemicals (such as illegal drugs, sedatives, sleeping pills, and benzodiazepines) can be dangerous or even fatal. If I stop opioids suddenly, I may have severe withdrawal symptoms.    Do not work for all types of pain nor for all patients. If they re not helpful, I may  be asked to stop them.        The risks, benefits and side effects of these medicine(s) were explained to me. I agree that:  1. I will take part in other treatments as advised by my care team. This may be psychiatry or counseling, physical therapy, behavioral therapy, group treatment or a referral to a specialist.     2. I will keep all my appointments. I understand that this is part of the monitoring of opioids. My care team may require an office visit for EVERY opioid/controlled substance refill. If I miss appointments or don t follow instructions, my care team may stop my medicine.    3. I will take my medicines as prescribed. I will not change the dose or schedule unless my care team tells me to. There will be no refills if I run out early.     4. I may be asked to come to the clinic and complete a urine drug test or complete a pill count at any time. If I don t give a urine sample or participate in a pill count, the care team may stop my medicine.    5. I will only receive prescriptions from this clinic for chronic pain. If I am treated by another provider for acute pain issues, I will tell them that I am taking opioid pain medication for chronic pain and that I have a treatment agreement with this provider. I will inform my [facility] care team within one business day if I am given a prescription for any pain medication by another healthcare provider. My [facility] care team can contact other providers and pharmacists about my use of any medicines.    6. It is up to me to make sure that I don t run out of my medicines on weekends or holidays. If my care team is willing to refill my opioid prescription without a visit, I must request refills only during office hours. Refills may take up to 3 business days to process. I will use one pharmacy to fill all my opioid and other controlled substance prescriptions. I will notify the clinic about any changes to my insurance or medication  availability.    7. I am responsible for my prescriptions. If the medicine/prescription is lost, stolen or destroyed, it will not be replaced. I also agree not to share controlled substance medicines with anyone.    8. I am aware I should not use any illegal or recreational drugs. I agree not to drink alcohol unless my care team says I can.       9. If I enroll in the [place] [facility] program, I will tell my care team prior to my next refill.     10. I will tell my care team right away if I become pregnant, have a new medical problem treated outside of my regular clinic, or have a change in my medications.    11. I understand that this medicine can affect my thinking, judgment and reaction time. Alcohol and drugs affect the brain and body, which can affect the safety of my driving. Being under the influence of alcohol or drugs can affect my decision-making, behaviors, personal safety, and the safety of others. Driving while impaired (DWI) can occur if a person is driving, operating, or in physical control of a car, motorcycle, boat, snowmobile, ATV, motorbike, off-road vehicle, or any other motor vehicle ([place] Statute 169A.20). I understand the risk if I choose to drive or operate any vehicle or machinery.    I understand that if I do not follow any of the conditions above, my prescriptions or treatment may be stopped or changed.          Opioids  What You Need to Know    What are opioids?   Opioids are pain medicines that must be prescribed by a doctor. They are also known as narcotics.     Examples are:   1. morphine (MS Contin, Kristina)  2. oxycodone (Oxycontin)  3. oxycodone and acetaminophen (Percocet)  4. hydrocodone and acetaminophen (Vicodin, Norco)   5. fentanyl patch (Duragesic)   6. hydromorphone (Dilaudid)   7. methadone  8. codeine (Tylenol #3)     What do opioids do well?   Opioids are best for severe short-term pain such as after a surgery or injury. They may work well for cancer pain. They may  help some people with long-lasting (chronic) pain.     What do opioids NOT do well?   Opioids never get rid of pain entirely, and they don t work well for most patients with chronic pain. Opioids don t reduce swelling, one of the causes of pain.                                    Other ways to manage chronic pain and improve function include:       Treat the health problem that may be causing pain    Anti-inflammation medicines, which reduce swelling and tenderness, such as ibuprofen (Advil, Motrin) or naproxen (Aleve)    Acetaminophen (Tylenol)    Antidepressants and anti-seizure medicines, especially for nerve pain    Topical treatments such as patches or creams    Injections or nerve blocks    Chiropractic or osteopathic treatment    Acupuncture, massage, deep breathing, meditation, visual imagery, aromatherapy    Use heat or ice at the pain site    Physical therapy     Exercise    Stop smoking    Take part in therapy       Risks and side effects     Talk to your doctor before you start or decide to keep taking opioids. Possible side effects include:      Lowering your breathing rate enough to cause death    Overdose, including death, especially if taking higher than prescribed doses    Worse depression symptoms; less pleasure in things you usually enjoy    Feeling tired or sluggish    Slower thoughts or cloudy thinking    Being more sensitive to pain over time; pain is harder to control    Trouble sleeping or restless sleep    Changes in hormone levels (for example, less testosterone)    Changes in sex drive or ability to have sex    Constipation    Unsafe driving    Itching and sweating    Dizziness    Nausea, throwing up and dry mouth    What else should I know about opioids?    Opioids may lead to dependence, tolerance, or addiction.      Dependence means that if you stop or reduce the medicine too quickly, you will have withdrawal symptoms. These include loose poop (diarrhea), jitters, flu-like symptoms,  nervousness and tremors. Dependence is not the same as addiction.                       Tolerance means needing higher doses over time to get the same effect. This may increase the chance of serious side effects.      Addiction is when people improperly use a substance that harms their body, their mind or their relations with others. Use of opiates can cause a relapse of addiction if you have a history of drug or alcohol abuse.      People who have used opioids for a long time may have a lower quality of life, worse depression, higher levels of pain and more visits to doctors.    You can overdose on opioids. Take these steps to lower your risk of overdose:    1. Recognize the signs:  Signs of overdose include decrease or loss of consciousness (blackout), slowed breathing, trouble waking up and blue lips. If someone is worried about overdose, they should call 911.    2. Talk to your doctor about Narcan (naloxone).   If you are at risk for overdose, you may be given a prescription for Narcan. This medicine very quickly reverses the effects of opioids.   If you overdose, a friend or family member can give you Narcan while waiting for the ambulance. They need to know the signs of overdose and how to give Narcan.     3. Don't use alcohol or street drugs.   Taking them with opioids can cause death.    4. Do not take any of these medicines unless your doctor says it s OK. Taking these with opioids can cause death:    Benzodiazepines, such as lorazepam (Ativan), alprazolam (Xanax) or diazepam (Valium)    Muscle relaxers, such as cyclobenzaprine (Flexeril)    Sleeping pills like zolpidem (Ambien)     Other opioids      How to keep you and other people safe while taking opioids:    1. Never share your opioids with others.  Opioid medicines are regulated by the Drug Enforcement Agency ([place]). Selling or sharing medications is a criminal act.    2. Be sure to store opioids in a secure place, locked up if possible. Young children  can easily swallow them and overdose.    3. When you are traveling with your medicines, keep them in the original bottles. If you use a pill box, be sure you also carry a copy of your medicine list from your clinic or pharmacy.    4. Safe disposal of opioids    Most pharmacies have places to get rid of medicine, called disposal kiosks. Medicine disposal options are also available in every [facility]. Search your county and  medication disposal  to find more options. You can find more details at:  [email or web address]     I agree that my provider, clinic care team, and pharmacy may work with any city, state or federal law enforcement agency that investigates the misuse, sale, or other diversion of my controlled medicine. I will allow my provider to discuss my care with, or share a copy of, this agreement with any other treating provider, pharmacy or emergency room where I receive care.    I have read this agreement and have asked questions about anything I did not understand.    _______________________________________________________  Patient Signature - Zoran Granado _____________________                   Date     _______________________________________________________  Provider Signature - Evelyn Iniguez MD   _____________________                   Date     _______________________________________________________  Witness Signature (required if provider not present while patient signing)   _____________________                   Date

## 2021-10-25 NOTE — NURSING NOTE
"Chief Complaint   Patient presents with     Pain     Hypertension     Lipids       Initial /58   Pulse 73   Temp 98.2  F (36.8  C)   Ht 1.74 m (5' 8.5\")   Wt 110.7 kg (244 lb)   SpO2 98%   BMI 36.56 kg/m   Estimated body mass index is 36.56 kg/m  as calculated from the following:    Height as of this encounter: 1.74 m (5' 8.5\").    Weight as of this encounter: 110.7 kg (244 lb).  Medication Reconciliation: complete  Des Choudhury LPN  "

## 2021-10-26 LAB — HCV AB SERPL QL IA: NONREACTIVE

## 2021-10-28 LAB — PREGABALIN UR QL CFM: PRESENT

## 2021-11-08 ENCOUNTER — TELEPHONE (OUTPATIENT)
Dept: FAMILY MEDICINE | Facility: OTHER | Age: 81
End: 2021-11-08
Payer: COMMERCIAL

## 2021-11-08 DIAGNOSIS — I10 ESSENTIAL HYPERTENSION: Primary | ICD-10-CM

## 2021-11-08 DIAGNOSIS — R60.0 EDEMA, LOWER EXTREMITY: ICD-10-CM

## 2021-11-08 RX ORDER — FUROSEMIDE 20 MG
20 TABLET ORAL DAILY
Qty: 30 TABLET | Refills: 1 | Status: SHIPPED | OUTPATIENT
Start: 2021-11-08 | End: 2021-12-29

## 2021-11-08 RX ORDER — AMLODIPINE BESYLATE 10 MG/1
5 TABLET ORAL DAILY
Qty: 90 TABLET | Refills: 1 | COMMUNITY
Start: 2021-11-08 | End: 2022-03-22

## 2021-11-08 NOTE — TELEPHONE ENCOUNTER
Edema is common side effect of the Norvasc.  Is on 10 mg of Norvasc.  Reduce to 5 mg of Norvasc.  Add Lasix 20 mg daily.  Schedule follow up in office within 1 month please.

## 2021-11-08 NOTE — TELEPHONE ENCOUNTER
Pt's wife called, reports pt's edema has not improved with compression stockings. She reports pt has developed a slight rash when wearing stockings. Rash resolves once stockings are removed and is not bothersome to patient. Wife wondering if this is related to pt's norvasc. States that they did discuss this with PCP at last office visit. Per notes- Edema, unspecified type  As above - may be exacerbated/affected by his Norvasc.   Asymptomatic to patient.  Start with compression.   Consider Lasix vs switching anti hypertensives    Wife wondering if pt should be started on lasix. Please advise. Thank you!

## 2021-11-09 ENCOUNTER — HOSPITAL ENCOUNTER (OUTPATIENT)
Dept: MRI IMAGING | Facility: HOSPITAL | Age: 81
Discharge: HOME | End: 2021-11-09
Attending: FAMILY MEDICINE | Admitting: FAMILY MEDICINE
Payer: COMMERCIAL

## 2021-11-09 DIAGNOSIS — M25.551 HIP PAIN, RIGHT: ICD-10-CM

## 2021-11-09 PROCEDURE — 73721 MRI JNT OF LWR EXTRE W/O DYE: CPT | Mod: RT

## 2021-11-12 ENCOUNTER — TRANSFERRED RECORDS (OUTPATIENT)
Dept: HEALTH INFORMATION MANAGEMENT | Facility: CLINIC | Age: 81
End: 2021-11-12

## 2021-12-29 DIAGNOSIS — I10 ESSENTIAL HYPERTENSION: ICD-10-CM

## 2021-12-29 DIAGNOSIS — R60.0 EDEMA, LOWER EXTREMITY: ICD-10-CM

## 2021-12-29 RX ORDER — FUROSEMIDE 20 MG
TABLET ORAL
Qty: 30 TABLET | Refills: 0 | Status: SHIPPED | OUTPATIENT
Start: 2021-12-29 | End: 2022-01-26

## 2021-12-29 NOTE — TELEPHONE ENCOUNTER
Lasix  Last Written Prescription Date: 11/8/21  Last Fill Quantity: 30 # of Refills: 1  Last Office Visit: 10/25/21

## 2022-01-26 ENCOUNTER — OFFICE VISIT (OUTPATIENT)
Dept: FAMILY MEDICINE | Facility: OTHER | Age: 82
End: 2022-01-26
Attending: FAMILY MEDICINE
Payer: COMMERCIAL

## 2022-01-26 VITALS
SYSTOLIC BLOOD PRESSURE: 138 MMHG | WEIGHT: 242 LBS | RESPIRATION RATE: 18 BRPM | TEMPERATURE: 98.4 F | HEIGHT: 69 IN | DIASTOLIC BLOOD PRESSURE: 66 MMHG | OXYGEN SATURATION: 97 % | BODY MASS INDEX: 35.84 KG/M2 | HEART RATE: 76 BPM

## 2022-01-26 DIAGNOSIS — M25.551 HIP PAIN, RIGHT: ICD-10-CM

## 2022-01-26 DIAGNOSIS — I10 ESSENTIAL HYPERTENSION: Primary | ICD-10-CM

## 2022-01-26 DIAGNOSIS — R60.0 EDEMA, LOWER EXTREMITY: ICD-10-CM

## 2022-01-26 DIAGNOSIS — M51.379 DEGENERATION OF LUMBAR OR LUMBOSACRAL INTERVERTEBRAL DISC: ICD-10-CM

## 2022-01-26 DIAGNOSIS — E78.5 HYPERLIPIDEMIA, UNSPECIFIED HYPERLIPIDEMIA TYPE: ICD-10-CM

## 2022-01-26 DIAGNOSIS — G89.4 CHRONIC PAIN SYNDROME: ICD-10-CM

## 2022-01-26 PROBLEM — Z79.891 LONG TERM (CURRENT) USE OF OPIATE ANALGESIC: Status: RESOLVED | Noted: 2018-08-09 | Resolved: 2022-01-26

## 2022-01-26 LAB
ANION GAP SERPL CALCULATED.3IONS-SCNC: 3 MMOL/L (ref 3–14)
BUN SERPL-MCNC: 17 MG/DL (ref 7–30)
CALCIUM SERPL-MCNC: 9.2 MG/DL (ref 8.5–10.1)
CHLORIDE BLD-SCNC: 107 MMOL/L (ref 94–109)
CO2 SERPL-SCNC: 28 MMOL/L (ref 20–32)
CREAT SERPL-MCNC: 1.08 MG/DL (ref 0.66–1.25)
GFR SERPL CREATININE-BSD FRML MDRD: 69 ML/MIN/1.73M2
GLUCOSE BLD-MCNC: 105 MG/DL (ref 70–99)
POTASSIUM BLD-SCNC: 4 MMOL/L (ref 3.4–5.3)
SODIUM SERPL-SCNC: 138 MMOL/L (ref 133–144)

## 2022-01-26 PROCEDURE — G0463 HOSPITAL OUTPT CLINIC VISIT: HCPCS | Mod: 25

## 2022-01-26 PROCEDURE — G0463 HOSPITAL OUTPT CLINIC VISIT: HCPCS

## 2022-01-26 PROCEDURE — 80048 BASIC METABOLIC PNL TOTAL CA: CPT | Mod: ZL | Performed by: FAMILY MEDICINE

## 2022-01-26 PROCEDURE — 99214 OFFICE O/P EST MOD 30 MIN: CPT | Performed by: FAMILY MEDICINE

## 2022-01-26 PROCEDURE — 36415 COLL VENOUS BLD VENIPUNCTURE: CPT | Mod: ZL | Performed by: FAMILY MEDICINE

## 2022-01-26 RX ORDER — FUROSEMIDE 20 MG
20 TABLET ORAL DAILY
Qty: 90 TABLET | Refills: 3 | Status: SHIPPED | OUTPATIENT
Start: 2022-01-26 | End: 2022-12-27

## 2022-01-26 ASSESSMENT — PAIN SCALES - GENERAL: PAINLEVEL: MODERATE PAIN (4)

## 2022-01-26 ASSESSMENT — MIFFLIN-ST. JEOR: SCORE: 1790.14

## 2022-01-26 NOTE — NURSING NOTE
"Chief Complaint   Patient presents with     Lipids     Hypertension     Pain       Initial /66 (BP Location: Right arm, Patient Position: Sitting, Cuff Size: Adult Regular)   Pulse 76   Temp 98.4  F (36.9  C) (Tympanic)   Resp 18   Ht 1.74 m (5' 8.5\")   Wt 109.8 kg (242 lb)   SpO2 97%   BMI 36.26 kg/m   Estimated body mass index is 36.26 kg/m  as calculated from the following:    Height as of this encounter: 1.74 m (5' 8.5\").    Weight as of this encounter: 109.8 kg (242 lb).  Medication Reconciliation: complete  Belkis Mcgraw MA  "

## 2022-01-26 NOTE — PATIENT INSTRUCTIONS
Try increasing Lyrica back up -   Keeping 75 mg dose in AM and changing PM dose to 150 mg.  Update me at end of 1 month.    Lab today - on Lasix - will call with results.  Refills sent.    Follow up 3 months.

## 2022-01-26 NOTE — PROGRESS NOTES
Assessment & Plan     Essential hypertension  Stable.  Edema improved with med changes outlined above.  Home readings at goal.  Update BMP.  Lasix refilled.  Continue Norvasc.  - Basic metabolic panel; Future  - furosemide (LASIX) 20 MG tablet; Take 1 tablet (20 mg) by mouth daily    Edema, lower extremity  Improved as above.  - Basic metabolic panel; Future  - furosemide (LASIX) 20 MG tablet; Take 1 tablet (20 mg) by mouth daily    Chronic pain syndrome  Right foot neuropathy post back surgery - slightly progressive.  Hips - continued pain.  Desires to go up on Lyrica.  Prior 150 mg BID.   Would like to try 75 mg AM and 150 mg PM.  Has some of the 150 mg tabs at home.    Will update me end of 1 month.    Degeneration of lumbar or lumbosacral intervertebral disc  Stable.    Hyperlipidemia, unspecified hyperlipidemia type  Continue statin.    Hip pain, right  Improved some with bursa injection.     Patient Instructions   Try increasing Lyrica back up -   Keeping 75 mg dose in AM and changing PM dose to 150 mg.  Update me at end of 1 month.    Lab today - on Lasix - will call with results.  Refills sent.    Follow up 3 months.      Return in about 3 months (around 4/26/2022) for chronic pain.    Evelyn Iniguez MD  [facility] - FADIA Meehan is a 82 year old who presents for the following health issues:    HPI   Hyperlipidemia Follow-Up    Are you regularly taking any medication or supplement to lower your cholesterol?   Yes- Lipitor    Are you having muscle aches or other side effects that you think could be caused by your cholesterol lowering medication?  No     Fasting labs 10/2021    Hypertension Follow-up    Do you check your blood pressure regularly outside of the clinic? Yes     Are you following a low salt diet? Yes    Are your blood pressures ever more than 140 on the top number (systolic) OR more   than 90 on the bottom number (diastolic), for example 140/90? No   Was  "having some edema on Norvasc 10 mg.  Reduced to 5 mg and Lasix 20 mg added 11/8/2021.  Home readings - 120s/70s-144 is highest    Pain History:  When did you first notice your pain? - More than 6 weeks   Have you seen this provider for your pain in the past?   Yes   Where in your body do you have pain? Hips  Are you seeing anyone else for your pain? Yes - Dr. Block  UDS 10/2021  Contract 10/2021  Lyrica PDMP 10/27/2021  75 mg BID.  Right foot pain - across toes/pads - intermittent sharp - all since back surgeries; no left symptoms.    MRI right hip - 11/9/2021 - labral tear, cystic change; mild to moderate OA.  Right hip bursa injection 11/12/2021 - Dr Block.  Did help for short term.  Patient defers hip joint injection.  Dr Block felt read was incorrect - no tear or significant arthritis.  Back is \"pretty good\".  Both hips ache.  No falls.  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    Prior Lyrica 150 mg - changed to 75 mg 9/2021.  Has some 150 mg at home.        No drowsiness or constipation.    PHQ-9 SCORE 7/25/2019 7/29/2020 9/3/2021   PHQ-9 Total Score 1 0 5       ROBYN-7 SCORE 7/25/2019 7/29/2020 9/3/2021   Total Score 0 0 1         Chronic Pain Follow Up:  Location of pain: Bilateral Hips  Analgesia/pain control:    - Recent changes:  none    - Overall control: Inadequate pain control    - Current treatments: Hip injections   Adherence:     - Do you ever take more pain medicine than prescribed? No    - When did you take your last dose of pain medicine?  none   Adverse effects: No   PDMP Review       Value Time User    State PDMP site checked  Yes 1/26/2022  9:17 AM Evelyn Olson MD        Last CSA Agreement:   CSA -- Patient Level:    Controlled Substance Agreement - Opioid - Scan on 10/29/2021  9:35 AM: OPIOD/OPIOD PLUS CONTROLLED SUBSTANCE AGREEMENT  Controlled Substance Agreement - Opioid - Scan on 10/30/2020 10:59 AM: OPIOD/OPIOD PLUS CONTROLLED SUBSTANCE AGREEMENT  Controlled Substance Agreement - Opioid - Scan " "on 7/26/2019 12:01 PM: OPIOD/OPIOD PLUS CONTROLLED SUBSTANCE AGREEMENT       Last UDS: 10/28/2021      How many servings of fruits and vegetables do you eat daily?  2-3    On average, how many sweetened beverages do you drink each day (Examples: soda, juice, sweet tea, etc.  Do NOT count diet or artificially sweetened beverages)?   0    How many days per week do you exercise enough to make your heart beat faster? 3 or less    How many minutes a day do you exercise enough to make your heart beat faster? 9 or less    How many days per week do you miss taking your medication? 0        Review of Systems   Constitutional, HEENT, cardiovascular, pulmonary, gi and gu systems are negative, except as otherwise noted.      Objective    /66 (BP Location: Right arm, Patient Position: Sitting, Cuff Size: Adult Regular)   Pulse 76   Temp 98.4  F (36.9  C) (Tympanic)   Resp 18   Ht 1.74 m (5' 8.5\")   Wt 109.8 kg (242 lb)   SpO2 97%   BMI 36.26 kg/m    Body mass index is 36.26 kg/m .  Physical Exam   GENERAL: alert, no distress and over weight  RESP: lungs clear to auscultation - no rales, rhonchi or wheezes  CV: regular rate and rhythm, normal S1 S2, no S3 or S4, no murmur, click or rub, no peripheral edema and peripheral pulses strong  MS: normal muscle tone, trace edema to bilateral ankles and peripheral pulses normal; SLR negative bilaterally; normal gait without assistance; mild tenderness right greater trochanter; limited ROM internal/external rotation of right hip, but not painful  SKIN: no suspicious lesions or rashes  BACK: no CVA tenderness, no paralumbar tenderness  PSYCH: mentation appears normal, affect normal/bright  Diabetic foot exam: normal DP and PT pulses, no trophic changes or ulcerative lesions and abnormal monofilament exam right foot - ball of foot/toes, distal top of foot; left foot monofilament normal    Labs pending            "

## 2022-02-17 PROBLEM — F11.90 CHRONIC, CONTINUOUS USE OF OPIOIDS: Chronic | Status: ACTIVE | Noted: 2019-07-25

## 2022-03-17 DIAGNOSIS — G62.9 NEUROPATHY: ICD-10-CM

## 2022-03-17 DIAGNOSIS — M51.369 DDD (DEGENERATIVE DISC DISEASE), LUMBAR: ICD-10-CM

## 2022-03-17 NOTE — TELEPHONE ENCOUNTER
Pregabalin      Last Written Prescription Date:  10/25/21  Last Fill Quantity: 180,   # refills: 1  Last Office Visit: 1/26/22  Future Office visit:    Next 5 appointments (look out 90 days)    Apr 27, 2022  9:15 AM  (Arrive by 9:00 AM)  SHORT with Evelyn Olson MD  [facility] [place] ([facility] [place] ) [address]  [place] [address]  [phone]           Routing refill request to provider for review/approval because:

## 2022-03-18 RX ORDER — PREGABALIN 75 MG/1
75 CAPSULE ORAL 2 TIMES DAILY
Qty: 180 CAPSULE | Refills: 1 | Status: SHIPPED | OUTPATIENT
Start: 2022-03-18 | End: 2022-07-27 | Stop reason: DRUGHIGH

## 2022-03-21 DIAGNOSIS — G62.9 NEUROPATHY: Primary | ICD-10-CM

## 2022-03-21 DIAGNOSIS — I10 ESSENTIAL HYPERTENSION: ICD-10-CM

## 2022-03-21 DIAGNOSIS — M51.369 DDD (DEGENERATIVE DISC DISEASE), LUMBAR: ICD-10-CM

## 2022-03-21 RX ORDER — PREGABALIN 150 MG/1
150 CAPSULE ORAL AT BEDTIME
Qty: 90 CAPSULE | Refills: 0 | Status: SHIPPED | OUTPATIENT
Start: 2022-03-21 | End: 2022-06-01

## 2022-03-21 NOTE — TELEPHONE ENCOUNTER
Patient calling and reports he takes one 75 mg capsule of lyrica in the AM and one 150 mg capsule at bedtime. Patient is requesting refill of 150mg capsule. Medication pended for approval. Please advise, thank you.     Office visit notes from 01/26/22  Patient Instructions   Try increasing Lyrica back up -   Keeping 75 mg dose in AM and changing PM dose to 150 mg.  Update me at end of 1 month.

## 2022-03-22 RX ORDER — AMLODIPINE BESYLATE 10 MG/1
TABLET ORAL
Qty: 90 TABLET | Refills: 2 | Status: SHIPPED | OUTPATIENT
Start: 2022-03-22 | End: 2022-12-23

## 2022-04-19 DIAGNOSIS — I10 ESSENTIAL HYPERTENSION: ICD-10-CM

## 2022-04-20 RX ORDER — LOSARTAN POTASSIUM 50 MG/1
TABLET ORAL
Qty: 90 TABLET | Refills: 2 | Status: SHIPPED | OUTPATIENT
Start: 2022-04-20 | End: 2023-01-20

## 2022-04-27 ENCOUNTER — OFFICE VISIT (OUTPATIENT)
Dept: FAMILY MEDICINE | Facility: OTHER | Age: 82
End: 2022-04-27
Attending: FAMILY MEDICINE
Payer: COMMERCIAL

## 2022-04-27 VITALS
WEIGHT: 235 LBS | RESPIRATION RATE: 16 BRPM | SYSTOLIC BLOOD PRESSURE: 132 MMHG | OXYGEN SATURATION: 97 % | HEART RATE: 68 BPM | TEMPERATURE: 97.2 F | BODY MASS INDEX: 34.8 KG/M2 | DIASTOLIC BLOOD PRESSURE: 64 MMHG | HEIGHT: 69 IN

## 2022-04-27 DIAGNOSIS — M25.551 HIP PAIN, RIGHT: ICD-10-CM

## 2022-04-27 DIAGNOSIS — M51.369 DDD (DEGENERATIVE DISC DISEASE), LUMBAR: ICD-10-CM

## 2022-04-27 DIAGNOSIS — Z23 NEED FOR VACCINATION: ICD-10-CM

## 2022-04-27 DIAGNOSIS — G89.4 CHRONIC PAIN SYNDROME: Primary | ICD-10-CM

## 2022-04-27 PROCEDURE — G0463 HOSPITAL OUTPT CLINIC VISIT: HCPCS | Performed by: FAMILY MEDICINE

## 2022-04-27 PROCEDURE — G0463 HOSPITAL OUTPT CLINIC VISIT: HCPCS | Mod: 25

## 2022-04-27 PROCEDURE — 99213 OFFICE O/P EST LOW 20 MIN: CPT | Performed by: FAMILY MEDICINE

## 2022-04-27 PROCEDURE — 90750 HZV VACC RECOMBINANT IM: CPT | Mod: GY

## 2022-04-27 ASSESSMENT — PAIN SCALES - GENERAL: PAINLEVEL: SEVERE PAIN (6)

## 2022-04-27 NOTE — PROGRESS NOTES
"  Assessment & Plan     Chronic pain syndrome  Desires to continue Lyrica.  Not on any narcotics at this point.  PDMP reviewed.    Hip pain, right  Not bursal.  Does feel catching at times - which is likely that labral tear.  Does have moderate arthritis per MRI radiology read.  He will contact Dr Block to schedule joint injection.    DDD (degenerative disc disease), lumbar  S/p prior surgery.  No radicular symptoms.  Suspect some symptomatic arthritis, degenerative change.  If hip injection not helpful, update lumbar spine MRI to assess for potential treatment - injection, ablation.    Need for vaccination  - SHINGRIX [[id number]]     BMI:   Estimated body mass index is 35.21 kg/m  as calculated from the following:    Height as of this encounter: 1.74 m (5' 8.5\").    Weight as of this encounter: 106.6 kg (235 lb).   Weight management plan: Discussed healthy diet and exercise guidelines    See Patient Instructions    Return in about 3 months (around 7/27/2022) for chronic pain.    Evelyn Iniguez MD  [facility] - [place] Meehan is a 82 year old who presents for the following health issues     HPI     Pain History:  Lyrica increased back up to 75 mg AM, 150 mg PM   PDMP reviewed - Lyrica 150 mg filled 3/21/22 for 90 day supply, 75 mg 2/2/22  Increase didn't seem to help.  Side effects - constipation; added Miralax and Citrucel; going every other day now  When did you first notice your pain? - Chronic Pain   Have you seen this provider for your pain in the past?   Yes   Where in your body do you have pain? Hip, right  Are you seeing anyone else for your pain? No    Ok at rest.  With any activity - even walking, shopping - \"miserable\"  Pain is more lateral, not really in groin area.  At times, across back.    Had spine surgery 2/3/21 - L4/5.  Success - leg symptoms resolved.    Bursa injection with Dr Block- minimal help.  Per his note - consider joint injection next.  MRI hip - labral tear " "and mild to moderate OA.  Dr Block disagreed with the MRI read.    Declines change to medication at this time. Wants to continue.    PHQ-9 SCORE 7/25/2019 7/29/2020 9/3/2021   PHQ-9 Total Score 1 0 5       ROBYN-7 SCORE 7/25/2019 7/29/2020 9/3/2021   Total Score 0 0 1           PEG Score 10/25/2021 4/27/2022   PEG Total Score 7 5.33       Chronic Pain Follow Up:    Location of pain: hip  Analgesia/pain control:    - Recent changes:  increase    - Overall control: Inadequate pain control    - Current treatments: lyrica 75 and 150mg   Adherence:     - Do you ever take more pain medicine than prescribed? No    - When did you take your last dose of pain medicine?  This morning   Adverse effects: No   PDMP Review       Value Time User    State PDMP site checked  Yes 4/26/2022  7:22 PM Evelyn Olson MD        Last CSA Agreement:   CSA -- Patient Level:    Controlled Substance Agreement - Opioid - Scan on 10/29/2021  9:35 AM: OPIOD/OPIOD PLUS CONTROLLED SUBSTANCE AGREEMENT  Controlled Substance Agreement - Opioid - Scan on 10/30/2020 10:59 AM: OPIOD/OPIOD PLUS CONTROLLED SUBSTANCE AGREEMENT  Controlled Substance Agreement - Opioid - Scan on 7/26/2019 12:01 PM: OPIOD/OPIOD PLUS CONTROLLED SUBSTANCE AGREEMENT       Last UDS: 10/28/2021        Review of Systems   Constitutional, HEENT, cardiovascular, pulmonary, gi and gu systems are negative, except as otherwise noted.      Objective    /64 (BP Location: Right arm, Patient Position: Sitting, Cuff Size: Adult Large)   Pulse 68   Temp 97.2  F (36.2  C) (Tympanic)   Resp 16   Ht 1.74 m (5' 8.5\")   Wt 106.6 kg (235 lb)   SpO2 97%   BMI 35.21 kg/m    Body mass index is 35.21 kg/m .  Physical Exam   GENERAL: healthy, alert and no distress  RESP: lungs clear to auscultation - no rales, rhonchi or wheezes  CV: regular rate and rhythm, normal S1 S2, no S3 or S4, no murmur, click or rub, no peripheral edema and peripheral pulses strong  MS: normal gait; right hip - non " tender top palpation; minimal discomfort with full AROM - flexion, internal and external rotation; negative SLR; symmetric reflexes; no midline spine tenderness; right paraspinal moderate tenderness  SKIN: no suspicious lesions or rashes  NEURO: Normal strength and tone, mentation intact and speech normal  PSYCH: mentation appears normal, affect normal/bright

## 2022-04-27 NOTE — PATIENT INSTRUCTIONS
Continue Lyrica as is.    Contact Dr Block's office to schedule right intra articular (in joint) injection at [facility].  If relief - we know it is coming from the hip.    If not - then proceed with updating MRI lumbar spine (last done 1 1/2 years ago prior to surgery).  May benefit from injections in the back/spine.

## 2022-04-27 NOTE — NURSING NOTE
"Chief Complaint   Patient presents with     Pain     chronic       Initial /64 (BP Location: Right arm, Patient Position: Sitting, Cuff Size: Adult Large)   Pulse 68   Temp 97.2  F (36.2  C) (Tympanic)   Resp 16   Ht 1.74 m (5' 8.5\")   Wt 106.6 kg (235 lb)   SpO2 97%   BMI 35.21 kg/m   Estimated body mass index is 35.21 kg/m  as calculated from the following:    Height as of this encounter: 1.74 m (5' 8.5\").    Weight as of this encounter: 106.6 kg (235 lb).  Medication Reconciliation: complete  Mohini Calhoun LPN    "

## 2022-05-09 ENCOUNTER — IMMUNIZATION (OUTPATIENT)
Dept: FAMILY MEDICINE | Facility: OTHER | Age: 82
End: 2022-05-09
Attending: FAMILY MEDICINE
Payer: COMMERCIAL

## 2022-05-09 PROCEDURE — 0054A COVID-19,PF,PFIZER (12+ YRS): CPT

## 2022-05-31 DIAGNOSIS — G62.9 NEUROPATHY: ICD-10-CM

## 2022-05-31 DIAGNOSIS — M51.369 DDD (DEGENERATIVE DISC DISEASE), LUMBAR: ICD-10-CM

## 2022-06-01 RX ORDER — PREGABALIN 150 MG/1
CAPSULE ORAL
Qty: 90 CAPSULE | Refills: 0 | Status: SHIPPED | OUTPATIENT
Start: 2022-06-01 | End: 2022-07-27

## 2022-06-01 NOTE — TELEPHONE ENCOUNTER
LYRICA      Last Written Prescription Date:  3-  Last Fill Quantity: 90,   # refills: 0  Last Office Visit: 4-  Future Office visit:    Next 5 appointments (look out 90 days)    Jul 27, 2022  9:45 AM  (Arrive by 9:30 AM)  SHORT with Evelyn Olson MD  [facility] ([facility] - [place] ) [address]  [place] [address]  [phone]           Routing refill request to provider for review/approval because:  Drug not on the [facility], [facility] or [facility] refill protocol or controlled substance

## 2022-06-14 PROBLEM — G47.30 SLEEP APNEA: Status: ACTIVE | Noted: 2022-06-14

## 2022-06-14 PROBLEM — I25.10 CORONARY ATHEROSCLEROSIS: Status: ACTIVE | Noted: 2022-06-14

## 2022-06-14 PROBLEM — K59.00 CONSTIPATION: Status: ACTIVE | Noted: 2021-02-03

## 2022-06-14 NOTE — PROGRESS NOTES
[facility]  [address] [address]  Phone: [phone]  Primary Provider: Evelyn Olson  Pre-op Performing Provider: KURT MCNULTY        PREOPERATIVE EVALUATION:  Today's date: 6/15/2022    Zoran Granado is a 82 year old male who presents for a preoperative evaluation.    Surgical Information:  Surgery/Procedure: Hip Injection   Surgery Location: [facility]  Surgeon: Umair  Surgery Date: 6/24/22  Time of Surgery: TBD  Where patient plans to recover: At home with family  Fax number for surgical facility: on file    Type of Anesthesia Anticipated: to be determined      HPI related to upcoming procedure:     Osteoarthritis, Right hip        MR HIP RIGHT W/O CONTRAST - 11/2021     HISTORY: 81 yearsMale Hip pain, chronic, osteoarthritis suspected; Hip  pain, right pain and stiffness when walking. Symptoms of years  duration.     TECHNIQUE: Coronal STIR, coronal T1, axial T2 STIR imaging of the  pelvis was performed followed by coronal PD fat-sat, coronal proton  density, sagittal T1, axial proton density fat sat, sagittal PD  fat-sat imaging of the right hip.     COMPARISON: Plain films left hip 6/24/2021     FINDINGS: There is edema in the posterior paraspinous musculature at  the lumbosacral junction. There are surgical changes in the lower  lumbar spine.     There is mild to moderate marginal osteophytic change of the head neck  junction of the femur. There is a tear of the superior labrum  extending from the 11:00 posteriorly to the 2:00 position anteriorly.  There is perilabral cystic change     There is mild cartilage signal heterogeneity and cartilage thinning..     There is mild-to-moderate tendinosis of the gluteus medius and minimus  and medius tendons.     The sacroiliac joints and pubic symphysis are congruent. The left hip  joint is congruent. There is no bone marrow edema within the pelvis or  hips to suggest fracture.                                                                       IMPRESSION: Superior labral tear with perilabral cystic change. The  tear extends from the position posteriorly to the 2:00 position  anteriorly. There is mild-to-moderate osteoarthritic change and  chondromalacia.     CONNOR HADDAD MD            Preop Questions 6/15/2022   1. Have you ever had a heart attack or stroke? No   2. Have you ever had surgery on your heart or blood vessels, such as a stent placement, a coronary artery bypass, or surgery on an artery in your head, neck, heart, or legs? No   3. Do you have chest pain with activity? No   4. Do you have a history of  heart failure? No   5. Do you currently have a cold, bronchitis or symptoms of other infection? No   6. Do you have a cough, shortness of breath, or wheezing? No   7. Do you or anyone in your family have previous history of blood clots? No   8. Do you or does anyone in your family have a serious bleeding problem such as prolonged bleeding following surgeries or cuts? No   9. Have you ever had problems with anemia or been told to take iron pills? No   10. Have you had any abnormal blood loss such as black, tarry or bloody stools? No   11. Have you ever had a blood transfusion? No   12. Are you willing to have a blood transfusion if it is medically needed before, during, or after your surgery? Yes   13. Have you or any of your relatives ever had problems with anesthesia? No   14. Do you have sleep apnea, excessive snoring or daytime drowsiness? UNKNOWN - Had it in the past   14a. Do you have a CPAP machine? -   15. Do you have any artifical heart valves or other implanted medical devices like a pacemaker, defibrillator, or continuous glucose monitor? No   16. Do you have artificial joints? No   17. Are you allergic to latex? No         Health Care Directive:  Patient has a Health Care Directive on file        Status of Chronic Conditions:  See problem list for active medical problems.  Problems all longstanding and  stable, except as noted/documented.  See ROS for pertinent symptoms related to these conditions.      HYPERLIPIDEMIA - Patient has a long history of significant Hyperlipidemia requiring medication for treatment with recent good control. Patient reports no problems or side effects with the medication.       HYPERTENSION - Patient has longstanding history of HTN , currently denies any symptoms referable to elevated blood pressure. Specifically denies chest pain, palpitations, dyspnea, orthopnea, PND or peripheral edema. Blood pressure readings have been in normal range. Current medication regimen is as listed below. Patient denies any side effects of medication.       Review of Systems  CONSTITUTIONAL: NEGATIVE for fever, chills, change in weight  INTEGUMENTARY/SKIN: NEGATIVE for worrisome rashes, moles or lesions  EYES: NEGATIVE for vision changes or irritation  ENT/MOUTH: NEGATIVE for ear, mouth and throat problems  RESP: NEGATIVE for significant cough or SOB  CV: NEGATIVE for chest pain, palpitations or peripheral edema  GI: NEGATIVE for nausea, abdominal pain, heartburn, or change in bowel habits  : NEGATIVE for frequency, dysuria, or hematuria  NEURO: NEGATIVE for weakness, dizziness or paresthesias  ENDOCRINE: NEGATIVE for temperature intolerance, skin/hair changes  HEME: NEGATIVE for bleeding problems  PSYCHIATRIC: NEGATIVE for changes in mood or affect    Patient Active Problem List    Diagnosis Date Noted     Coronary atherosclerosis 06/14/2022     Priority: Medium     Sleep apnea 06/14/2022     Priority: Medium     Formatting of this note might be different from the original.  noncompliant w/CPAP       Essential hypertension 06/24/2021     Priority: Medium     Constipation 02/03/2021     Priority: Medium     Obesity (BMI 30.0-34.9) 02/14/2020     Priority: Medium     Obesity (BMI 35.0-39.9) with comorbidity (H) 10/25/2019     Priority: Medium     Chronic pain syndrome 07/25/2019     Priority: Medium      Hyperlipidemia, unspecified hyperlipidemia type 07/25/2019     Priority: Medium     Chronic, continuous use of opioids 07/25/2019     Priority: Medium     Patient is followed by Evelyn Iniguez MD for ongoing prescription of pain medication.  All refills should only be approved by this provider, or covering partner.    Medication(s): Norco 10/325mg.   Maximum quantity per month: #95  Clinic visit frequency required: Q 3 months      Controlled substance agreement:  Encounter-Level CSA:    There are no encounter-level csa.     Patient-Level CSA:    Controlled Substance Agreement - Opioid - Scan on 7/26/2019 12:01 PM: OPIOD/OPIOD PLUS CONTROLLED SUBSTANCE AGREEMENT (below)         Pain Clinic evaluation in the past: No    DIRE Total Score(s):  No flowsheet data found.    Last [place] website verification:  done on 7.25.19.   [email or web address]         Acute pain of left shoulder 10/04/2018     Priority: Medium     Muscle weakness 10/04/2018     Priority: Medium     Shoulder stiffness, left 10/04/2018     Priority: Medium     Impingement syndrome of left shoulder 08/03/2018     Priority: Medium     Class 1 obesity without serious comorbidity with body mass index (BMI) of 33.0 to 33.9 in adult, unspecified obesity type 06/12/2018     Priority: Medium     Advance Care Planning 08/22/2017     Priority: Medium     Multiple lipomas 09/05/2013     Priority: Medium     Elevated fasting glucose 04/14/2011     Priority: Medium     IMO Update 10/11       Cervicalgia 04/07/2010     Priority: Medium     IMO Update 10/11       HCD (health care directive) 10/08/2009     Priority: Medium     IMO Regulatory Load OCT 2020       Adjustment disorder with depressed mood 08/23/2007     Priority: Medium     Degeneration of cervical intervertebral disc 08/23/2007     Priority: Medium     IMO Update 10/11       Degeneration of lumbar or lumbosacral intervertebral disc 08/23/2007     Priority: Medium     IMO Update 10/11        Thoracic and lumbosacral neuritis 08/23/2007     Priority: Medium     IMO Update 10/11       Lumbago 04/23/2007     Priority: Medium     IMO Update 10/11       Sciatica 03/28/2006     Priority: Medium     Left       Esophageal reflux 08/31/2004     Priority: Medium     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 Degenerative.   IMO Update 10/11            Past Medical History:   Diagnosis Date     Cataract     both eyes     Chronic back pain      DDD (degenerative disc disease), cervical      DDD (degenerative disc disease), lumbar      GERD (gastroesophageal reflux disease)      Hyperlipidemia      Lumbar radiculopathy     Dr. Brown;  Spine; plan revision decompression and fusion L4/5     Macular degeneration     receives injections; Dr. Taveras in [place]     Prediabetes      Trochanteric bursitis of both hips     s/p bilateral injections - [facility]         Past Surgical History:   Procedure Laterality Date     APPENDECTOMY       ARTHROSCOPY KNEE BILATERAL       BACK SURGERY  2000    lumbar L5/S1     CATARACT IOL, RT/LT Bilateral      COLONOSCOPY       COLONOSCOPY - HIM SCAN  09/09/2004    Colonoscopy & Polypectomy - a small polyp.  Path - benign colonic mucosa     disc replacement  2001    lumbar; single level L5/S1; Dr Fauzia Renner [facility] ECP WITH CATARACT SURGERY  2012    both eyes     NISSEN FUNDOPLICATION       SHOULDER SURGERY      Bilateral; twice left, once right; arthroscopies     SPINE SURGERY  02/03/2021    decompression hemilaminotomy descectomy L4/5; posterior spine fusion L4/5, trasnforaminal interbody fusion L4/5, hardware removal L5/S1; Dr [facility]     TONSILLECTOMY           Current Outpatient Medications   Medication Sig Dispense Refill     Acetaminophen 325 MG CAPS Take 325 mg by mouth 3 times daily       amLODIPine (NORVASC) 10 MG tablet Take 1 tablet by mouth once daily 90 tablet 2     aspirin 81 MG tablet Take by mouth daily        atorvastatin (LIPITOR) 80 MG tablet Take 1 tablet (80 mg) by mouth daily 90 tablet 3     calcium carbonate-vitamin D 600-200 MG-UNIT CAPS Take by mouth 2 times daily        diphenhydrAMINE-acetaminophen (TYLENOL PM)  MG tablet Take 1 tablet by mouth nightly as needed for sleep       furosemide (LASIX) 20 MG tablet Take 1 tablet (20 mg) by mouth daily 90 tablet 3     losartan (COZAAR) 50 MG tablet Take 1 tablet by mouth once daily 90 tablet 2     Multiple Vitamins-Minerals (PRESERVISION AREDS 2 PO) Take 1 capsule by mouth 2 times daily       pregabalin (LYRICA) 150 MG capsule Take 1 capsule by mouth at bedtime 90 capsule 0     pregabalin (LYRICA) 75 MG capsule Take 1 capsule (75 mg) by mouth 2 times daily 180 capsule 1         Allergies   Allergen Reactions     Seasonal Allergies         Social History     Tobacco Use     Smoking status: Former Smoker     Types: Cigarettes     Quit date: 1992     Years since quittin.4     Smokeless tobacco: Never Used   Substance Use Topics     Alcohol use: Never       Family History   Problem Relation Age of Onset     Diabetes Mother      Heart Disease Mother      Heart Disease Father      Lipids Sister         5 sisters     Lipids Brother         2 brothers     Diabetes Brother      Hypertension Brother      Breast Cancer Sister         breast     History   Drug Use No           Objective     BP (!) 144/70 (BP Location: Left arm, Patient Position: Chair, Cuff Size: Adult Regular)   Pulse 67   Temp 97.7  F (36.5  C) (Tympanic)   Wt 106.5 kg (234 lb 12.8 oz)   SpO2 97%   BMI 35.18 kg/m        Physical Exam    GENERAL APPEARANCE: healthy, alert and no distress     EYES: EOMI,  PERRL     HENT: ear canals and TM's normal and nose and mouth without ulcers or lesions.  Upper denture in place     NECK: no adenopathy, no asymmetry, masses, or scars and thyroid normal to palpation     RESP: lungs clear to auscultation - no rales, rhonchi or wheezes     CV: regular  rates and rhythm, normal S1 S2, no S3 or S4 and no murmur, click or rub     ABDOMEN:  soft, nontender, no HSM or masses and bowel sounds normal     MS: Left hip pain     SKIN: no suspicious lesions or rashes     NEURO: Normal strength and tone, sensory exam grossly normal, mentation intact and speech normal     PSYCH: mentation appears normal. and affect normal/bright     LYMPHATICS: No cervical adenopathy        Recent Labs   Lab Test 01/26/22  0940 10/25/21  1039 09/03/21  1158 01/13/21  1054 10/29/20  0914   HGB  --  14.2  --  14.3 14.9   PLT  --  244  --  287 250   INR  --   --   --  0.98  --     140   < > 140 139   POTASSIUM 4.0 4.1   < > 4.3 4.4   CR 1.08 1.07   < > 0.91 1.05   A1C  --  6.0*  --   --  5.9*    < > = values in this interval not displayed.          Diagnostics:  Recent Results (from the past 48 hour(s))   Comprehensive metabolic panel    Collection Time: 06/15/22  2:12 PM   Result Value Ref Range    Sodium 141 133 - 144 mmol/L    Potassium 4.3 3.4 - 5.3 mmol/L    Chloride 109 94 - 109 mmol/L    Carbon Dioxide (CO2) 27 20 - 32 mmol/L    Anion Gap 5 3 - 14 mmol/L    Urea Nitrogen 19 7 - 30 mg/dL    Creatinine 1.11 0.66 - 1.25 mg/dL    Calcium 9.3 8.5 - 10.1 mg/dL    Glucose 113 (H) 70 - 99 mg/dL    Alkaline Phosphatase 87 40 - 150 U/L    AST 15 0 - 45 U/L    ALT 24 0 - 70 U/L    Protein Total 7.2 6.8 - 8.8 g/dL    Albumin 3.8 3.4 - 5.0 g/dL    Bilirubin Total 0.4 0.2 - 1.3 mg/dL    GFR Estimate 67 >60 mL/min/1.73m2   CBC with platelets and differential    Collection Time: 06/15/22  2:12 PM   Result Value Ref Range    WBC Count 6.4 4.0 - 11.0 10e3/uL    RBC Count 4.73 4.40 - 5.90 10e6/uL    Hemoglobin 14.3 13.3 - 17.7 g/dL    Hematocrit 42.5 40.0 - 53.0 %    MCV 90 78 - 100 fL    MCH 30.2 26.5 - 33.0 pg    MCHC 33.6 31.5 - 36.5 g/dL    RDW 13.8 10.0 - 15.0 %    Platelet Count 264 150 - 450 10e3/uL    % Neutrophils 53 %    % Lymphocytes 34 %    % Monocytes 10 %    % Eosinophils 3 %    %  Basophils 1 %    Absolute Neutrophils 3.3 1.6 - 8.3 10e3/uL    Absolute Lymphocytes 2.2 0.8 - 5.3 10e3/uL    Absolute Monocytes 0.6 0.0 - 1.3 10e3/uL    Absolute Eosinophils 0.2 0.0 - 0.7 10e3/uL    Absolute Basophils 0.1 0.0 - 0.2 10e3/uL   *UA reflex [facility] Microscopic and [facility]    Collection Time: 06/15/22  2:15 PM    Specimen: Urine, Midstream   Result Value Ref Range    Color Urine Yellow Colorless, Straw, Light Yellow, Yellow    Appearance Urine Clear Clear    Glucose Urine Negative Negative mg/dL    Bilirubin Urine Negative Negative    Ketones Urine Negative Negative mg/dL    Specific Gravity Urine <=1.005 1.003 - 1.035    Blood Urine Negative Negative    pH Urine 6.0 5.0 - 7.0    Protein Albumin Urine Negative Negative mg/dL    Urobilinogen Urine 0.2 0.2, 1.0 E.U./dL    Nitrite Urine Negative Negative    Leukocyte Esterase Urine Negative Negative            EKG: appears normal, NSR, nonspecific intraventricular block, reviewed by Internal Medicine as safe to proceed, unchanged from previous tracings      Revised Cardiac Risk Index (RCRI):  The patient has the following serious cardiovascular risks for perioperative complications:     - No serious cardiac risks = 0 points       RCRI Interpretation: 0 points: Class I (very low risk - 0.4% complication rate)        Assessment & Plan     The proposed surgical procedure is considered LOW risk.    Pre-op exam  - EKG  - Comprehensive metabolic panel  - CBC with platelets and differential  - UA reflex to Microscopic and Culture - [facility]      Hip arthritis  - See above         Risks and Recommendations:  The patient has the following additional risks and recommendations for perioperative complications:   - No identified additional risk factors other than previously addressed        RECOMMENDATION:  APPROVAL GIVEN to proceed with proposed procedure, without further diagnostic evaluation.           Signed Electronically by: Isis Garcia  CNP  Copy of this evaluation report is provided to requesting physician.    567}

## 2022-06-15 ENCOUNTER — OFFICE VISIT (OUTPATIENT)
Dept: FAMILY MEDICINE | Facility: OTHER | Age: 82
End: 2022-06-15
Attending: NURSE PRACTITIONER
Payer: COMMERCIAL

## 2022-06-15 VITALS
HEART RATE: 67 BPM | DIASTOLIC BLOOD PRESSURE: 70 MMHG | BODY MASS INDEX: 35.18 KG/M2 | WEIGHT: 234.8 LBS | SYSTOLIC BLOOD PRESSURE: 144 MMHG | OXYGEN SATURATION: 97 % | TEMPERATURE: 97.7 F

## 2022-06-15 DIAGNOSIS — Z01.818 PRE-OP EXAM: Primary | ICD-10-CM

## 2022-06-15 DIAGNOSIS — Z01.818 PREOP GENERAL PHYSICAL EXAM: ICD-10-CM

## 2022-06-15 DIAGNOSIS — M16.10 HIP ARTHRITIS: ICD-10-CM

## 2022-06-15 LAB
ALBUMIN SERPL-MCNC: 3.8 G/DL (ref 3.4–5)
ALBUMIN UR-MCNC: NEGATIVE MG/DL
ALP SERPL-CCNC: 87 U/L (ref 40–150)
ALT SERPL W P-5'-P-CCNC: 24 U/L (ref 0–70)
ANION GAP SERPL CALCULATED.3IONS-SCNC: 5 MMOL/L (ref 3–14)
APPEARANCE UR: CLEAR
AST SERPL W P-5'-P-CCNC: 15 U/L (ref 0–45)
BASOPHILS # BLD AUTO: 0.1 10E3/UL (ref 0–0.2)
BASOPHILS NFR BLD AUTO: 1 %
BILIRUB SERPL-MCNC: 0.4 MG/DL (ref 0.2–1.3)
BILIRUB UR QL STRIP: NEGATIVE
BUN SERPL-MCNC: 19 MG/DL (ref 7–30)
CALCIUM SERPL-MCNC: 9.3 MG/DL (ref 8.5–10.1)
CHLORIDE BLD-SCNC: 109 MMOL/L (ref 94–109)
CO2 SERPL-SCNC: 27 MMOL/L (ref 20–32)
COLOR UR AUTO: YELLOW
CREAT SERPL-MCNC: 1.11 MG/DL (ref 0.66–1.25)
EOSINOPHIL # BLD AUTO: 0.2 10E3/UL (ref 0–0.7)
EOSINOPHIL NFR BLD AUTO: 3 %
ERYTHROCYTE [DISTWIDTH] IN BLOOD BY AUTOMATED COUNT: 13.8 % (ref 10–15)
GFR SERPL CREATININE-BSD FRML MDRD: 67 ML/MIN/1.73M2
GLUCOSE BLD-MCNC: 113 MG/DL (ref 70–99)
GLUCOSE UR STRIP-MCNC: NEGATIVE MG/DL
HCT VFR BLD AUTO: 42.5 % (ref 40–53)
HGB BLD-MCNC: 14.3 G/DL (ref 13.3–17.7)
HGB UR QL STRIP: NEGATIVE
KETONES UR STRIP-MCNC: NEGATIVE MG/DL
LEUKOCYTE ESTERASE UR QL STRIP: NEGATIVE
LYMPHOCYTES # BLD AUTO: 2.2 10E3/UL (ref 0.8–5.3)
LYMPHOCYTES NFR BLD AUTO: 34 %
MCH RBC QN AUTO: 30.2 PG (ref 26.5–33)
MCHC RBC AUTO-ENTMCNC: 33.6 G/DL (ref 31.5–36.5)
MCV RBC AUTO: 90 FL (ref 78–100)
MONOCYTES # BLD AUTO: 0.6 10E3/UL (ref 0–1.3)
MONOCYTES NFR BLD AUTO: 10 %
NEUTROPHILS # BLD AUTO: 3.3 10E3/UL (ref 1.6–8.3)
NEUTROPHILS NFR BLD AUTO: 53 %
NITRATE UR QL: NEGATIVE
PH UR STRIP: 6 [PH] (ref 5–7)
PLATELET # BLD AUTO: 264 10E3/UL (ref 150–450)
POTASSIUM BLD-SCNC: 4.3 MMOL/L (ref 3.4–5.3)
PROT SERPL-MCNC: 7.2 G/DL (ref 6.8–8.8)
RBC # BLD AUTO: 4.73 10E6/UL (ref 4.4–5.9)
SODIUM SERPL-SCNC: 141 MMOL/L (ref 133–144)
SP GR UR STRIP: <=1.005 (ref 1–1.03)
UROBILINOGEN UR STRIP-ACNC: 0.2 E.U./DL
WBC # BLD AUTO: 6.4 10E3/UL (ref 4–11)

## 2022-06-15 PROCEDURE — 85025 COMPLETE CBC W/AUTO DIFF WBC: CPT | Mod: ZL | Performed by: NURSE PRACTITIONER

## 2022-06-15 PROCEDURE — 99213 OFFICE O/P EST LOW 20 MIN: CPT | Performed by: NURSE PRACTITIONER

## 2022-06-15 PROCEDURE — 93005 ELECTROCARDIOGRAM TRACING: CPT | Performed by: NURSE PRACTITIONER

## 2022-06-15 PROCEDURE — G0463 HOSPITAL OUTPT CLINIC VISIT: HCPCS | Mod: 25 | Performed by: NURSE PRACTITIONER

## 2022-06-15 PROCEDURE — 93010 ELECTROCARDIOGRAM REPORT: CPT | Mod: 77 | Performed by: INTERNAL MEDICINE

## 2022-06-15 PROCEDURE — 80053 COMPREHEN METABOLIC PANEL: CPT | Mod: ZL | Performed by: NURSE PRACTITIONER

## 2022-06-15 PROCEDURE — 82040 ASSAY OF SERUM ALBUMIN: CPT | Mod: ZL | Performed by: NURSE PRACTITIONER

## 2022-06-15 PROCEDURE — 81003 URINALYSIS AUTO W/O SCOPE: CPT | Mod: ZL | Performed by: NURSE PRACTITIONER

## 2022-06-15 PROCEDURE — 36415 COLL VENOUS BLD VENIPUNCTURE: CPT | Mod: ZL | Performed by: NURSE PRACTITIONER

## 2022-06-15 ASSESSMENT — PAIN SCALES - GENERAL: PAINLEVEL: SEVERE PAIN (6)

## 2022-06-15 NOTE — PATIENT INSTRUCTIONS
Preparing for Your Surgery  Getting started  A nurse will call you to review your health history and instructions. They will give you an arrival time based on your scheduled surgery time. Please be ready to share:    Your doctor's clinic name and phone number    Your medical, surgical and anesthesia history    A list of allergies and sensitivities    A list of medicines, including herbal treatments and over-the-counter drugs    Whether the patient has a legal guardian (ask how to send us the papers in advance)  Please tell us if you're pregnant--or if there's any chance you might be pregnant. Some surgeries may injure a fetus (unborn baby), so they require a pregnancy test. Surgeries that are safe for a fetus don't always need a test, and you can choose whether to have one.   If you have a child who's having surgery, please ask for a copy of Preparing for Your Child's Surgery.    Preparing for surgery    Within 30 days of surgery: Have a pre-op exam (sometimes called an H&P, or History and Physical). This can be done at a clinic or pre-operative center.  ? If you're having a , you may not need this exam. Talk to your care team.    At your pre-op exam, talk to your care team about all medicines you take. If you need to stop any medicines before surgery, ask when to start taking them again.  ? We do this for your safety. Many medicines can make you bleed too much during surgery. Some change how well surgery (anesthesia) drugs work.    Call your insurance company to let them know you're having surgery. (If you don't have insurance, call [phone].)    Call your clinic if there's any change in your health. This includes signs of a cold or flu (sore throat, runny nose, cough, rash, fever). It also includes a scrape or scratch near the surgery site.    If you have questions on the day of surgery, call your hospital or surgery center.  COVID testing  You may need to be tested for COVID-19 before having  surgery. If so, we will give you instructions.  Eating and drinking guidelines  For your safety: Unless your surgeon tells you otherwise, follow the guidelines below.    Eat and drink as usual until 8 hours before surgery. After that, no food or milk.    Drink clear liquids until 2 hours before surgery. These are liquids you can see through, like water, Gatorade and Propel Water. You may also have black coffee and tea (no cream or milk).    Nothing by mouth within 2 hours of surgery. This includes gum, candy and breath mints.    If you drink alcohol: Stop drinking it the night before surgery.    If your care team tells you to take medicine on the morning of surgery, it's okay to take it with a sip of water.  Preventing infection    Shower or bathe the night before and morning of your surgery. Follow the instructions your clinic gave you. (If no instructions, use regular soap.)    Don't shave or clip hair near your surgery site. We'll remove the hair if needed.    Don't smoke or vape the morning of surgery. You may chew nicotine gum up to 2 hours before surgery. A nicotine patch is okay.  ? Note: Some surgeries require you to completely quit smoking and nicotine. Check with your surgeon.    Your care team will make every effort to keep you safe from infection. We will:  ? Clean our hands often with soap and water (or an alcohol-based hand rub).  ? Clean the skin at your surgery site with a special soap that kills germs.  ? Give you a special gown to keep you warm. (Cold raises the risk of infection.)  ? Wear special hair covers, masks, gowns and gloves during surgery.  ? Give antibiotic medicine, if prescribed. Not all surgeries need antibiotics.  What to bring on the day of surgery    Photo ID and insurance card    Copy of your health care directive, if you have one    Glasses and hearing aides (bring cases)  ? You can't wear contacts during surgery    Inhaler and eye drops, if you use them (tell us about these when  you arrive)    CPAP machine or breathing device, if you use them    A few personal items, if spending the night    If you have . . .  ? A pacemaker, ICD (cardiac defibrillator) or other implant: Bring the ID card.  ? An implanted stimulator: Bring the remote control.  ? A legal guardian: Bring a copy of the certified (court-stamped) guardianship papers.  Please remove any jewelry, including body piercings. Leave jewelry and other valuables at home.  If you're going home the day of surgery    You must have a responsible adult drive you home. They should stay with you overnight as well.    If you don't have someone to stay with you, and you aren't safe to go home alone, we may keep you overnight. Insurance often won't pay for this.  After surgery  If it's hard to control your pain or you need more pain medicine, please call your surgeon's office.  Questions?   If you have any questions for your care team, list them here: _________________________________________________________________________________________________________________________________________________________________________ ____________________________________ ____________________________________ ____________________________________  For informational purposes only. Not to replace the advice of your health care provider. Copyright   2003, 2019 [facility]. All rights reserved. Clinically reviewed by Kasandra Gross MD. [employer] [id number] - REV 07/21.

## 2022-06-15 NOTE — NURSING NOTE
"Chief Complaint   Patient presents with     Pre-Op Exam       Initial BP (!) 144/70 (BP Location: Left arm, Patient Position: Chair, Cuff Size: Adult Regular)   Pulse 67   Temp 97.7  F (36.5  C) (Tympanic)   Wt 106.5 kg (234 lb 12.8 oz)   SpO2 97%   BMI 35.18 kg/m   Estimated body mass index is 35.18 kg/m  as calculated from the following:    Height as of 4/27/22: 1.74 m (5' 8.5\").    Weight as of this encounter: 106.5 kg (234 lb 12.8 oz).  Medication Reconciliation: complete  Joselin Strauss    "

## 2022-06-16 DIAGNOSIS — M51.369 DDD (DEGENERATIVE DISC DISEASE), LUMBAR: ICD-10-CM

## 2022-06-16 DIAGNOSIS — G62.9 NEUROPATHY: ICD-10-CM

## 2022-06-16 NOTE — TELEPHONE ENCOUNTER
MELANIERICA    JUST FILLED  Last Written Prescription Date:  6-1-2022  Last Fill Quantity: 90,   # refills: 0

## 2022-06-17 RX ORDER — PREGABALIN 150 MG/1
CAPSULE ORAL
Qty: 90 CAPSULE | Refills: 0 | OUTPATIENT
Start: 2022-06-17

## 2022-07-20 NOTE — PROGRESS NOTES
Assessment & Plan     Neuropathy  Requesting increase back to 150 mg BID of Lyrica for chronic pain.  Script adjusted.  See patient instructions  - pregabalin (LYRICA) 150 MG capsule; Take 1 capsule (150 mg) by mouth 2 times daily    DDD (degenerative disc disease), lumbar  As above.  - pregabalin (LYRICA) 150 MG capsule; Take 1 capsule (150 mg) by mouth 2 times daily    Muscle cramps  See patient instructions.  - Magnesium; Future  - Potassium; Future       Patient Instructions   Increase Lyrica back to 150 mg twice daily.  Continue Tylenol, Ibuprofen at bedtime.  Ok to use during the day as well if needed, especially with more active days.    Follow up with Dr Block for hip pain if progressing.    Lab today - magnesium and potassium levels - for muscle cramps.  Stay hydrated.  Massage/stretch in the evening to try to prevent cramps.  Foam roller? Theracane? Theragun?  Vitamin B complex and Vitamin E supplements have shown some benefit as well.  Consider physical therapy if worsening.    Follow up 3 months.      Return in about 3 months (around 10/27/2022) for chronic pain.    Evelyn Iniguez MD  [facility] - FADIA Meehan is a 82 year old, presenting for the following health issues:  chronic pain      HPI     Pain History:  When did you first notice your pain? - Chronic Pain   Have you seen this provider for your pain in the past?   Yes   Where in your body do you have pain? Shoulder and hips  Are you seeing anyone else for your pain? No    PHQ-9 SCORE 7/25/2019 7/29/2020 9/3/2021   PHQ-9 Total Score 1 0 5       ROBYN-7 SCORE 7/25/2019 7/29/2020 9/3/2021   Total Score 0 0 1         PEG Score 10/25/2021 4/27/2022 7/27/2022   PEG Total Score 7 5.33 2       Chronic Pain Follow Up:    Analgesia/pain control:    - Recent changes:  none    - Overall control: Tolerable with discomfort    - Current treatments: Lyrica 150 mg HS    PDMP 90 day refill on 6/16/22       Hip injection 6/24/22 -  "Dr Block - brief relief; arthritis; patient declines surgery at this time.    Prior back surgery 2/3/21 - L4/5 - improved leg symptoms.  Slowly leg symptoms.    House projects - painting shutters; mowing lawn.  Takes breaks, takes time.    Stable over past 3 months.    Cramps - toes, ankles.    Tylenol at night.  2 Ibuprofen at night.    Adherence:     - Do you ever take more pain medicine than prescribed? No    - When did you take your last dose of pain medicine?  no   Adverse effects: No   PDMP Review       Value Time User    State PDMP site checked  Yes 6/1/2022 11:21 AM Evelyn Olson MD        Last CSA Agreement:   CSA -- Patient Level:    Controlled Substance Agreement - Opioid - Scan on 10/29/2021  9:35 AM: OPIOD/OPIOD PLUS CONTROLLED SUBSTANCE AGREEMENT  Controlled Substance Agreement - Opioid - Scan on 10/30/2020 10:59 AM: OPIOD/OPIOD PLUS CONTROLLED SUBSTANCE AGREEMENT  Controlled Substance Agreement - Opioid - Scan on 7/26/2019 12:01 PM: OPIOD/OPIOD PLUS CONTROLLED SUBSTANCE AGREEMENT       Last UDS: 10/28/2021      Review of Systems   Constitutional, HEENT, cardiovascular, pulmonary, gi and gu systems are negative, except as otherwise noted.      Objective    /60 (BP Location: Left arm, Patient Position: Sitting, Cuff Size: Adult Regular)   Pulse 70   Temp 97.7  F (36.5  C) (Tympanic)   Ht 1.74 m (5' 8.5\")   Wt 103.8 kg (228 lb 12.8 oz)   SpO2 96%   BMI 34.28 kg/m    Body mass index is 34.28 kg/m .  Physical Exam   GENERAL: healthy, alert and no distress  NECK: no adenopathy, no asymmetry, masses, or scars and thyroid normal to palpation  RESP: lungs clear to auscultation - no rales, rhonchi or wheezes  CV: regular rate and rhythm, normal S1 S2, no S3 or S4, no murmur, click or rub, no peripheral edema and peripheral pulses strong  ABDOMEN: soft, nontender, no hepatosplenomegaly, no masses and bowel sounds normal  MS: normal muscle tone, no edema, normal pulses and tenderness to palpation " mildly in lumbar spine, paralumbar as well; negative SLR; normal gait  SKIN: no suspicious lesions or rashes  NEURO: Normal strength and tone, mentation intact and speech normal  PSYCH: mentation appears normal, affect normal/bright    Labs pending.                .  ..

## 2022-07-27 ENCOUNTER — OFFICE VISIT (OUTPATIENT)
Dept: FAMILY MEDICINE | Facility: OTHER | Age: 82
End: 2022-07-27
Attending: FAMILY MEDICINE
Payer: COMMERCIAL

## 2022-07-27 VITALS
WEIGHT: 228.8 LBS | DIASTOLIC BLOOD PRESSURE: 60 MMHG | HEART RATE: 70 BPM | TEMPERATURE: 97.7 F | HEIGHT: 69 IN | SYSTOLIC BLOOD PRESSURE: 136 MMHG | OXYGEN SATURATION: 96 % | BODY MASS INDEX: 33.89 KG/M2

## 2022-07-27 DIAGNOSIS — G62.9 NEUROPATHY: ICD-10-CM

## 2022-07-27 DIAGNOSIS — M51.369 DDD (DEGENERATIVE DISC DISEASE), LUMBAR: ICD-10-CM

## 2022-07-27 DIAGNOSIS — R25.2 MUSCLE CRAMPS: Primary | ICD-10-CM

## 2022-07-27 LAB
MAGNESIUM SERPL-MCNC: 2.1 MG/DL (ref 1.6–2.3)
POTASSIUM BLD-SCNC: 4.3 MMOL/L (ref 3.4–5.3)

## 2022-07-27 PROCEDURE — 36415 COLL VENOUS BLD VENIPUNCTURE: CPT | Mod: ZL | Performed by: FAMILY MEDICINE

## 2022-07-27 PROCEDURE — G0463 HOSPITAL OUTPT CLINIC VISIT: HCPCS

## 2022-07-27 PROCEDURE — 84132 ASSAY OF SERUM POTASSIUM: CPT | Mod: ZL | Performed by: FAMILY MEDICINE

## 2022-07-27 PROCEDURE — 83735 ASSAY OF MAGNESIUM: CPT | Mod: ZL | Performed by: FAMILY MEDICINE

## 2022-07-27 PROCEDURE — 99214 OFFICE O/P EST MOD 30 MIN: CPT | Performed by: FAMILY MEDICINE

## 2022-07-27 RX ORDER — PREGABALIN 150 MG/1
150 CAPSULE ORAL 2 TIMES DAILY
Qty: 180 CAPSULE | Refills: 1 | Status: SHIPPED | OUTPATIENT
Start: 2022-07-27 | End: 2023-01-30

## 2022-07-27 ASSESSMENT — PAIN SCALES - GENERAL: PAINLEVEL: MODERATE PAIN (5)

## 2022-07-27 NOTE — PATIENT INSTRUCTIONS
Increase Lyrica back to 150 mg twice daily.  Continue Tylenol, Ibuprofen at bedtime.  Ok to use during the day as well if needed, especially with more active days.    Follow up with Dr Block for hip pain if progressing.    Lab today - magnesium and potassium levels - for muscle cramps.  Stay hydrated.  Massage/stretch in the evening to try to prevent cramps.  Foam roller? Theracane? Theragun?  Vitamin B complex and Vitamin E supplements have shown some benefit as well.  Consider physical therapy if worsening.    Follow up 3 months.

## 2022-07-27 NOTE — NURSING NOTE
"Chief Complaint   Patient presents with     chronic pain       Initial /60 (BP Location: Left arm, Patient Position: Sitting, Cuff Size: Adult Regular)   Pulse 70   Temp 97.7  F (36.5  C) (Tympanic)   Ht 1.74 m (5' 8.5\")   Wt 103.8 kg (228 lb 12.8 oz)   SpO2 96%   BMI 34.28 kg/m   Estimated body mass index is 34.28 kg/m  as calculated from the following:    Height as of this encounter: 1.74 m (5' 8.5\").    Weight as of this encounter: 103.8 kg (228 lb 12.8 oz).  Medication Reconciliation: complete  Brynn Barker LPN  "

## 2022-09-14 ENCOUNTER — HOSPITAL ENCOUNTER (EMERGENCY)
Facility: HOSPITAL | Age: 82
Discharge: HOME | End: 2022-09-14
Attending: PHYSICIAN ASSISTANT | Admitting: PHYSICIAN ASSISTANT
Payer: COMMERCIAL

## 2022-09-14 ENCOUNTER — APPOINTMENT (OUTPATIENT)
Dept: CT IMAGING | Facility: HOSPITAL | Age: 82
End: 2022-09-14
Attending: PHYSICIAN ASSISTANT
Payer: COMMERCIAL

## 2022-09-14 VITALS
DIASTOLIC BLOOD PRESSURE: 64 MMHG | TEMPERATURE: 97 F | OXYGEN SATURATION: 95 % | SYSTOLIC BLOOD PRESSURE: 147 MMHG | RESPIRATION RATE: 17 BRPM | HEART RATE: 67 BPM

## 2022-09-14 DIAGNOSIS — H83.03: ICD-10-CM

## 2022-09-14 DIAGNOSIS — R42 VERTIGO: ICD-10-CM

## 2022-09-14 DIAGNOSIS — E86.0 DEHYDRATION: ICD-10-CM

## 2022-09-14 LAB
ALBUMIN SERPL-MCNC: 3.8 G/DL (ref 3.4–5)
ALBUMIN UR-MCNC: NEGATIVE MG/DL
ALP SERPL-CCNC: 77 U/L (ref 40–150)
ALT SERPL W P-5'-P-CCNC: 21 U/L (ref 0–70)
ANION GAP SERPL CALCULATED.3IONS-SCNC: 3 MMOL/L (ref 3–14)
APPEARANCE UR: CLEAR
AST SERPL W P-5'-P-CCNC: 17 U/L (ref 0–45)
BASOPHILS # BLD AUTO: 0.1 10E3/UL (ref 0–0.2)
BASOPHILS NFR BLD AUTO: 1 %
BILIRUB SERPL-MCNC: 0.4 MG/DL (ref 0.2–1.3)
BILIRUB UR QL STRIP: NEGATIVE
BUN SERPL-MCNC: 22 MG/DL (ref 7–30)
CALCIUM SERPL-MCNC: 9.3 MG/DL (ref 8.5–10.1)
CHLORIDE BLD-SCNC: 106 MMOL/L (ref 94–109)
CO2 SERPL-SCNC: 30 MMOL/L (ref 20–32)
COLOR UR AUTO: NORMAL
CREAT SERPL-MCNC: 1.63 MG/DL (ref 0.66–1.25)
CRP SERPL-MCNC: <2.9 MG/L (ref 0–8)
EOSINOPHIL # BLD AUTO: 0.1 10E3/UL (ref 0–0.7)
EOSINOPHIL NFR BLD AUTO: 2 %
ERYTHROCYTE [DISTWIDTH] IN BLOOD BY AUTOMATED COUNT: 14.1 % (ref 10–15)
FLUAV RNA SPEC QL NAA+PROBE: NEGATIVE
FLUBV RNA RESP QL NAA+PROBE: NEGATIVE
GFR SERPL CREATININE-BSD FRML MDRD: 42 ML/MIN/1.73M2
GLUCOSE BLD-MCNC: 109 MG/DL (ref 70–99)
GLUCOSE UR STRIP-MCNC: NEGATIVE MG/DL
HCT VFR BLD AUTO: 43.1 % (ref 40–53)
HGB BLD-MCNC: 14.6 G/DL (ref 13.3–17.7)
HGB UR QL STRIP: NEGATIVE
HOLD SPECIMEN: NORMAL
IMM GRANULOCYTES # BLD: 0 10E3/UL
IMM GRANULOCYTES NFR BLD: 0 %
KETONES UR STRIP-MCNC: NEGATIVE MG/DL
LEUKOCYTE ESTERASE UR QL STRIP: NEGATIVE
LYMPHOCYTES # BLD AUTO: 2.3 10E3/UL (ref 0.8–5.3)
LYMPHOCYTES NFR BLD AUTO: 33 %
MAGNESIUM SERPL-MCNC: 2.1 MG/DL (ref 1.6–2.3)
MCH RBC QN AUTO: 30 PG (ref 26.5–33)
MCHC RBC AUTO-ENTMCNC: 33.9 G/DL (ref 31.5–36.5)
MCV RBC AUTO: 89 FL (ref 78–100)
MONOCYTES # BLD AUTO: 0.6 10E3/UL (ref 0–1.3)
MONOCYTES NFR BLD AUTO: 9 %
NEUTROPHILS # BLD AUTO: 3.7 10E3/UL (ref 1.6–8.3)
NEUTROPHILS NFR BLD AUTO: 55 %
NITRATE UR QL: NEGATIVE
NRBC # BLD AUTO: 0 10E3/UL
NRBC BLD AUTO-RTO: 0 /100
NT-PROBNP SERPL-MCNC: 40 PG/ML (ref 0–1800)
PH UR STRIP: 7 [PH] (ref 4.7–8)
PLATELET # BLD AUTO: 219 10E3/UL (ref 150–450)
POTASSIUM BLD-SCNC: 4.3 MMOL/L (ref 3.4–5.3)
PROT SERPL-MCNC: 7.1 G/DL (ref 6.8–8.8)
RBC # BLD AUTO: 4.86 10E6/UL (ref 4.4–5.9)
RBC URINE: <1 /HPF
RSV RNA SPEC NAA+PROBE: NEGATIVE
SARS-COV-2 RNA RESP QL NAA+PROBE: NEGATIVE
SODIUM SERPL-SCNC: 139 MMOL/L (ref 133–144)
SP GR UR STRIP: 1.01 (ref 1–1.03)
SQUAMOUS EPITHELIAL: 0 /HPF
TROPONIN I SERPL HS-MCNC: 6 NG/L
UROBILINOGEN UR STRIP-MCNC: 2 MG/DL
WBC # BLD AUTO: 6.8 10E3/UL (ref 4–11)
WBC URINE: 1 /HPF

## 2022-09-14 PROCEDURE — 70450 CT HEAD/BRAIN W/O DYE: CPT

## 2022-09-14 PROCEDURE — 93010 ELECTROCARDIOGRAM REPORT: CPT | Performed by: INTERNAL MEDICINE

## 2022-09-14 PROCEDURE — 82040 ASSAY OF SERUM ALBUMIN: CPT | Performed by: PHYSICIAN ASSISTANT

## 2022-09-14 PROCEDURE — 83880 ASSAY OF NATRIURETIC PEPTIDE: CPT | Mod: GZ | Performed by: PHYSICIAN ASSISTANT

## 2022-09-14 PROCEDURE — 250N000011 HC RX IP 250 OP 636: Performed by: PHYSICIAN ASSISTANT

## 2022-09-14 PROCEDURE — 36415 COLL VENOUS BLD VENIPUNCTURE: CPT | Performed by: PHYSICIAN ASSISTANT

## 2022-09-14 PROCEDURE — 96374 THER/PROPH/DIAG INJ IV PUSH: CPT

## 2022-09-14 PROCEDURE — 96361 HYDRATE IV INFUSION ADD-ON: CPT

## 2022-09-14 PROCEDURE — 99285 EMERGENCY DEPT VISIT HI MDM: CPT | Mod: 25

## 2022-09-14 PROCEDURE — 81001 URINALYSIS AUTO W/SCOPE: CPT | Performed by: PHYSICIAN ASSISTANT

## 2022-09-14 PROCEDURE — 85025 COMPLETE CBC W/AUTO DIFF WBC: CPT | Performed by: PHYSICIAN ASSISTANT

## 2022-09-14 PROCEDURE — 87637 SARSCOV2&INF A&B&RSV AMP PRB: CPT | Performed by: PHYSICIAN ASSISTANT

## 2022-09-14 PROCEDURE — 87040 BLOOD CULTURE FOR BACTERIA: CPT | Performed by: PHYSICIAN ASSISTANT

## 2022-09-14 PROCEDURE — 258N000003 HC RX IP 258 OP 636: Performed by: PHYSICIAN ASSISTANT

## 2022-09-14 PROCEDURE — 250N000013 HC RX MED GY IP 250 OP 250 PS 637: Performed by: PHYSICIAN ASSISTANT

## 2022-09-14 PROCEDURE — C9803 HOPD COVID-19 SPEC COLLECT: HCPCS

## 2022-09-14 PROCEDURE — 80053 COMPREHEN METABOLIC PANEL: CPT | Performed by: PHYSICIAN ASSISTANT

## 2022-09-14 PROCEDURE — 93005 ELECTROCARDIOGRAM TRACING: CPT

## 2022-09-14 PROCEDURE — 84484 ASSAY OF TROPONIN QUANT: CPT | Performed by: PHYSICIAN ASSISTANT

## 2022-09-14 PROCEDURE — 86140 C-REACTIVE PROTEIN: CPT | Performed by: PHYSICIAN ASSISTANT

## 2022-09-14 PROCEDURE — 83735 ASSAY OF MAGNESIUM: CPT | Performed by: PHYSICIAN ASSISTANT

## 2022-09-14 PROCEDURE — 99284 EMERGENCY DEPT VISIT MOD MDM: CPT | Performed by: PHYSICIAN ASSISTANT

## 2022-09-14 RX ORDER — MECLIZINE HYDROCHLORIDE 25 MG/1
25 TABLET ORAL ONCE
Status: COMPLETED | OUTPATIENT
Start: 2022-09-14 | End: 2022-09-14

## 2022-09-14 RX ORDER — SODIUM CHLORIDE 9 MG/ML
INJECTION, SOLUTION INTRAVENOUS CONTINUOUS
Status: DISCONTINUED | OUTPATIENT
Start: 2022-09-14 | End: 2022-09-14 | Stop reason: HOSPADM

## 2022-09-14 RX ORDER — PSEUDOEPHEDRINE HCL 30 MG
30 TABLET ORAL ONCE
Status: COMPLETED | OUTPATIENT
Start: 2022-09-14 | End: 2022-09-14

## 2022-09-14 RX ORDER — PREDNISONE 10 MG/1
10 TABLET ORAL DAILY
Qty: 30 TABLET | Refills: 0 | Status: SHIPPED | OUTPATIENT
Start: 2022-09-14 | End: 2022-09-14 | Stop reason: ALTCHOICE

## 2022-09-14 RX ORDER — PREDNISONE 20 MG/1
TABLET ORAL
Qty: 15 TABLET | Refills: 0 | Status: SHIPPED | OUTPATIENT
Start: 2022-09-14 | End: 2022-10-27

## 2022-09-14 RX ORDER — PSEUDOEPHEDRINE HCL 30 MG
30 TABLET ORAL EVERY 4 HOURS PRN
Qty: 30 TABLET | Refills: 0 | Status: SHIPPED | OUTPATIENT
Start: 2022-09-14 | End: 2022-10-27

## 2022-09-14 RX ORDER — MECLIZINE HCL 12.5 MG 12.5 MG/1
50 TABLET ORAL 3 TIMES DAILY PRN
Qty: 30 TABLET | Refills: 0 | Status: SHIPPED | OUTPATIENT
Start: 2022-09-14 | End: 2022-10-27

## 2022-09-14 RX ORDER — METHYLPREDNISOLONE SODIUM SUCCINATE 125 MG/2ML
125 INJECTION, POWDER, LYOPHILIZED, FOR SOLUTION INTRAMUSCULAR; INTRAVENOUS ONCE
Status: COMPLETED | OUTPATIENT
Start: 2022-09-14 | End: 2022-09-14

## 2022-09-14 RX ADMIN — SODIUM CHLORIDE 1000 ML: 9 INJECTION, SOLUTION INTRAVENOUS at 20:50

## 2022-09-14 RX ADMIN — PSEUDOEPHEDRINE HCL 30 MG: 30 TABLET, FILM COATED ORAL at 18:52

## 2022-09-14 RX ADMIN — SODIUM CHLORIDE 1000 ML: 9 INJECTION, SOLUTION INTRAVENOUS at 18:34

## 2022-09-14 RX ADMIN — METHYLPREDNISOLONE SODIUM SUCCINATE 125 MG: 125 INJECTION, POWDER, FOR SOLUTION INTRAMUSCULAR; INTRAVENOUS at 20:56

## 2022-09-14 RX ADMIN — MECLIZINE HYDROCHLORIDE 25 MG: 25 TABLET ORAL at 20:54

## 2022-09-14 RX ADMIN — PSEUDOEPHEDRINE HCL 30 MG: 30 TABLET, FILM COATED ORAL at 21:00

## 2022-09-14 RX ADMIN — MECLIZINE HYDROCHLORIDE 25 MG: 25 TABLET ORAL at 18:32

## 2022-09-14 ASSESSMENT — ENCOUNTER SYMPTOMS
COUGH: 0
ACTIVITY CHANGE: 0
TREMORS: 0
FEVER: 0
MUSCULOSKELETAL NEGATIVE: 1
RHINORRHEA: 0
HEADACHES: 0
APPETITE CHANGE: 0
SHORTNESS OF BREATH: 0
VOMITING: 0
NAUSEA: 0
DIZZINESS: 1
SEIZURES: 0

## 2022-09-14 ASSESSMENT — ACTIVITIES OF DAILY LIVING (ADL)
ADLS_ACUITY_SCORE: 35
ADLS_ACUITY_SCORE: 35

## 2022-09-14 NOTE — ED TRIAGE NOTES
Patient presents with c/o of dizziness when he woke up and has progressively gotten worse. Patient reports HX of vertigo and patient reports that it feels similar to past experience with vertigo. Patient reports feeling normal when he went to bed last night at 2230/2300.    BEFAST- Negative

## 2022-09-14 NOTE — ED PROVIDER NOTES
History     Chief Complaint   Patient presents with     Dizziness     HPI  Zoran Granado is a 82 year old male who reports he woke up this morning and he seemed to have some dizziness.  This seemed to dissipate during the day.  He actually mowed the grass today without any issues.  He reports increased dizziness with positional changes.  He has had vertigo in the past and he reports that this feels much like his vertigo.  He is not on any medications for vertigo.  Denies any visual changes.  No balance issues.  No nausea or vomiting.  Denies any headache.  No lightheadedness.  He has not noticed any change in  strength or weaknesses.      Allergies:  Allergies   Allergen Reactions     Seasonal Allergies        Problem List:    Patient Active Problem List    Diagnosis Date Noted     Coronary atherosclerosis 06/14/2022     Priority: Medium     Sleep apnea 06/14/2022     Priority: Medium     Formatting of this note might be different from the original.  noncompliant w/CPAP       Essential hypertension 06/24/2021     Priority: Medium     Constipation 02/03/2021     Priority: Medium     Obesity (BMI 30.0-34.9) 02/14/2020     Priority: Medium     Obesity (BMI 35.0-39.9) with comorbidity (H) 10/25/2019     Priority: Medium     Chronic pain syndrome 07/25/2019     Priority: Medium     Hyperlipidemia, unspecified hyperlipidemia type 07/25/2019     Priority: Medium     Chronic, continuous use of opioids 07/25/2019     Priority: Medium     Patient is followed by Evelyn Iniguez MD for ongoing prescription of pain medication.  All refills should only be approved by this provider, or covering partner.    Medication(s): Norco 10/325mg.   Maximum quantity per month: #95  Clinic visit frequency required: Q 3 months      Controlled substance agreement:  Encounter-Level CSA:    There are no encounter-level csa.     Patient-Level CSA:    Controlled Substance Agreement - Opioid - Scan on 7/26/2019 12:01 PM: OPIOD/OPIOD  PLUS CONTROLLED SUBSTANCE AGREEMENT (below)         Pain Clinic evaluation in the past: No    DIRE Total Score(s):  No flowsheet data found.    Last [facility] website verification:  done on 7.25.19.   [email or web address]         Acute pain of left shoulder 10/04/2018     Priority: Medium     Muscle weakness 10/04/2018     Priority: Medium     Shoulder stiffness, left 10/04/2018     Priority: Medium     Impingement syndrome of left shoulder 08/03/2018     Priority: Medium     Class 1 obesity without serious comorbidity with body mass index (BMI) of 33.0 to 33.9 in adult, unspecified obesity type 06/12/2018     Priority: Medium     Advance Care Planning 08/22/2017     Priority: Medium     Multiple lipomas 09/05/2013     Priority: Medium     Elevated fasting glucose 04/14/2011     Priority: Medium     IMO Update 10/11       Cervicalgia 04/07/2010     Priority: Medium     IMO Update 10/11       HCD (health care directive) 10/08/2009     Priority: Medium     IMO Regulatory Load OCT 2020       Adjustment disorder with depressed mood 08/23/2007     Priority: Medium     Degeneration of cervical intervertebral disc 08/23/2007     Priority: Medium     IMO Update 10/11       Degeneration of lumbar or lumbosacral intervertebral disc 08/23/2007     Priority: Medium     IMO Update 10/11       Thoracic and lumbosacral neuritis 08/23/2007     Priority: Medium     IMO Update 10/11       Lumbago 04/23/2007     Priority: Medium     IMO Update 10/11       Sciatica 03/28/2006     Priority: Medium     Left       Esophageal reflux 08/31/2004     Priority: Medium     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 Degenerative.   IMO Update 10/11          Past Medical History:    Past Medical History:   Diagnosis Date     Cataract      Chronic back pain      DDD (degenerative disc disease), cervical      DDD (degenerative disc disease), lumbar      GERD (gastroesophageal reflux disease)       Hyperlipidemia      Lumbar radiculopathy      Macular degeneration      Prediabetes      Trochanteric bursitis of both hips        Past Surgical History:    Past Surgical History:   Procedure Laterality Date     APPENDECTOMY       ARTHROSCOPY KNEE BILATERAL       BACK SURGERY      lumbar L5/S1     CATARACT IOL, RT/LT Bilateral      COLONOSCOPY       COLONOSCOPY - HIM SCAN  2004    Colonoscopy & Polypectomy - a small polyp.  Path - benign colonic mucosa     disc replacement      lumbar; single level L5/S1; Dr Cage  [facility] WITH CATARACT SURGERY      both eyes     NISSEN FUNDOPLICATION       SHOULDER SURGERY      Bilateral; twice left, once right; arthroscopies     SPINE SURGERY  2021    decompression hemilaminotomy descectomy L4/5; posterior spine fusion L4/5, trasnforaminal interbody fusion L4/5, hardware removal L5/S1; Dr [facility]     TONSILLECTOMY         Family History:    Family History   Problem Relation Age of Onset     Diabetes Mother      Heart Disease Mother      Heart Disease Father      Lipids Sister         5 sisters     Lipids Brother         2 brothers     Diabetes Brother      Hypertension Brother      Breast Cancer Sister         breast       Social History:  Marital Status:   [2]  Social History     Tobacco Use     Smoking status: Former Smoker     Types: Cigarettes     Quit date: 1992     Years since quittin.7     Smokeless tobacco: Never Used   Substance Use Topics     Alcohol use: Never     Drug use: No        Medications:    Acetaminophen 325 MG CAPS  amLODIPine (NORVASC) 10 MG tablet  aspirin 81 MG tablet  atorvastatin (LIPITOR) 80 MG tablet  calcium carbonate-vitamin D 600-200 MG-UNIT CAPS  diphenhydrAMINE-acetaminophen (TYLENOL PM)  MG tablet  furosemide (LASIX) 20 MG tablet  losartan (COZAAR) 50 MG tablet  Multiple Vitamins-Minerals (PRESERVISION AREDS 2 PO)  pregabalin (LYRICA) 150 MG capsule          Review  of Systems   Constitutional: Negative for activity change, appetite change and fever.   HENT: Negative for ear discharge, ear pain, rhinorrhea and tinnitus.    Respiratory: Negative for cough and shortness of breath.    Gastrointestinal: Negative for nausea and vomiting.   Musculoskeletal: Negative.    Neurological: Positive for dizziness. Negative for tremors, seizures, syncope and headaches.       Physical Exam   BP: 152/82  Pulse: 70  Temp: 97  F (36.1  C)  Resp: 16  SpO2: (!) 69 %    Vitals:    09/14/22 1730 09/14/22 1813 09/14/22 1814 09/14/22 1817   BP: 152/82 141/67 149/76 130/70   Pulse: 70 67 64 70   Resp: 16 15 15 21   Temp: 97  F (36.1  C)      TempSrc: Tympanic      SpO2: (!) 69% 93%  94%       Physical Exam  Vitals and nursing note reviewed.   Constitutional:       General: He is not in acute distress.     Appearance: Normal appearance. He is not ill-appearing or toxic-appearing.   HENT:      Head: Normocephalic.      Right Ear: No drainage or tenderness. No middle ear effusion. There is no impacted cerumen. Tympanic membrane is bulging. Tympanic membrane is not perforated.      Left Ear: No drainage or tenderness.  No middle ear effusion. There is no impacted cerumen. Tympanic membrane is bulging. Tympanic membrane is not perforated.      Nose: Nose normal.   Eyes:      General: No scleral icterus.     Extraocular Movements: Extraocular movements intact.      Pupils: Pupils are equal, round, and reactive to light.   Neck:      Trachea: No tracheal deviation.   Cardiovascular:      Rate and Rhythm: Normal rate.   Pulmonary:      Effort: Pulmonary effort is normal. No respiratory distress.      Breath sounds: No stridor.   Musculoskeletal:         General: Normal range of motion.      Cervical back: Normal range of motion.   Skin:     General: Skin is warm and dry.      Coloration: Skin is not jaundiced or pale.   Neurological:      General: No focal deficit present.      Mental Status: He is alert and  oriented to person, place, and time.   Psychiatric:         Attention and Perception: Attention normal.         Mood and Affect: Mood normal.         ED Course   EKG shows normal sinus rhythm with a left bundle branch block heart rate is 67 this EKG is essentially unchanged from previous EKG on 6/15/2022.    ED Course as of 09/14/22 2037   Wed Sep 14, 2022   1751 Nonfocal neuro exam, doubt stroke   1916 ALT: 21 2035 Nt probnp inpatient (BNP)       No results found for this or any previous visit (from the past 24 hour(s)).    Medications - No data to display    Assessments & Plan (with Medical Decision Making)     I have reviewed the nursing notes.    I have reviewed the findings, diagnosis, plan and need for follow up with the patient.      New Prescriptions    MECLIZINE (ANTIVERT) 12.5 MG TABLET    Take 4 tablets (50 mg) by mouth 3 times daily as needed for dizziness    PREDNISONE (DELTASONE) 20 MG TABLET    Take two tablets (= 40mg) each day for 5 (five) days, then one tablet (20 mg) each day x 5 days until finished    PSEUDOEPHEDRINE (SUDAFED) 30 MG TABLET    Take 1 tablet (30 mg) by mouth every 4 hours as needed for congestion       Final diagnoses:   Vertigo   Dehydration   Acute viral labyrinthitis, bilateral     Zorangiulia Granado is a 82 year old male who reports he woke up this morning and he seemed to have some dizziness.  This seemed to dissipate during the day.  He actually mowed the grass today without any issues.  He reports increased dizziness with positional changes.  He has had vertigo in the past and he reports that this feels much like his vertigo.  He is not on any medications for vertigo.  Denies any visual changes.  No balance issues.  No nausea or vomiting.  Denies any headache.  No lightheadedness.  He has not noticed any change in  strength or weaknesses.    Differential diagnosis for dizziness includes but is not limited to vertigo, OME, labyrinthitis, vestibular neuritis, Ménière's  disease, TIA, CVA, brain tumor, endocrine disorders, hypovolemia, dehydration, electrolyte abnormalities,  anemia, cardiac arrhythmias, PE, hypertension, infections, and medications.  Afebrile.  Vital signs stable.  Orthostatics are positive with noted tachycardia with position changes.  IV was established and he was given fluids.  Physical examination shows no signs of any focal neurological deficits.  This was confirmed by Dr. Osmin arora as well.  Troponin is normal.  BNP is normal.  CBC shows normal white blood cells no left shift.  CMP shows a GFR 42, creatinine is 1.63 and BUN is 22.  These numbers are slightly worse than his usual.  Suggestive of dehydration.   Fluids continued.  Magnesium is normal.  CRP is normal.  CT of the patient's head shows No acute intracranial abnormality.  No acute fracture.  After receiving a liter of normal saline the patient's orthostatics were reevaluated he is still slightly orthostatic but also has increased dizziness with position changes.  He was started on a second liter of fluids and given additional dose of meclizine and Sudafed.  This time he was also given Solu-Medrol.   Afterwards he was able to produce a urine which was pristine with no signs of hematuria or UTI.  His symptoms seem to resolve with the second liter of fluids and additional Sudafed, meclizine and Solu-Medrol.  Vertigo, dehydration most likely some acute viral labyrinthitis bilateral.  I discussed that his symptoms may persist for a couple weeks.  I discussed continued monitoring return if there is any concerns for further evaluation as needed.  He is feeling much better and ready to return home.  I explained my diagnostic considerations and recommendations and the patient voiced an understanding and was in agreement with the treatment plan. All questions were answered to the best of my ability.  We discussed potential side effects of any prescribed or recommended therapies, as well as expectations for  response to treatments.         9/14/2022   [facility]     Raj Sapp PA-C  09/14/22 2179

## 2022-09-14 NOTE — ED NOTES
Patient report some dizziness from moving positions from laying to sitting and then again from sitting to standing. Patient reports after a moment of changing the position, patient feels the dizziness disappears. Patient was steady on his feet as he stood at bedside during orthostatic blood pressures were being measured.

## 2022-09-14 NOTE — ED NOTES
Patient reports dizziness with movement. Patient reports when going from laying to sitting and from sitting to standing with increased dizziness during these movements. Patient stated this started this morning. Wife at bedside

## 2022-09-15 NOTE — ED NOTES
Patient had c/o dizziness from lying to seated positioning during orthostatic BP test.  Patient became imbalanced and reached out for his wife even tough he was still sitting in bed.  He felt like he could fall to the side or forward.

## 2022-09-15 NOTE — ED NOTES
Face to face report given with opportunity to observe patient.    Report given to BELEN Burciaga RN   9/14/2022  7:04 PM

## 2022-09-15 NOTE — ED NOTES
Patient is discharging to home given information on labrynthitis, vertigo, use of prednisone, meclizine, and pseudoephidrine. Patient stated understanding of these instructions and is discharging by private auto with wife.

## 2022-09-20 LAB
BACTERIA BLD CULT: NO GROWTH
BACTERIA BLD CULT: NO GROWTH

## 2022-09-29 ENCOUNTER — TELEPHONE (OUTPATIENT)
Dept: FAMILY MEDICINE | Facility: OTHER | Age: 82
End: 2022-09-29

## 2022-09-29 NOTE — TELEPHONE ENCOUNTER
Emergency Department and Urgent Care Follow-up      Reason for ER/UC visit: dizziness, dehydration   o Date seen: 9/14/22      New or Worsening symptoms:  Dizziness is the same, no improvement since ED Visit        Prescription Received/Picked up from Pharmacy?: yes    o Medications started? Meclizine, prednisone and sudafed  o Any questions or issues regarding your prescription?: no       Follow-up Results or Labs that are pending: no       Questions or concerns?: concerns with dizziness continuing       ER Recommends Follow-up by: follow up if symptoms worsen or no improvement       RN Recommendations: continue with prescribed medications.   o Appointment scheduled:   o Please reach out to patient with ED follow up appointment at [phone]    If you start feeling worse, or have any further questions, please feel free to contact Nurse Triage at [phone].  If needing immediate medical attention at any time please call 911/Go to the ER.

## 2022-09-30 NOTE — PROGRESS NOTES
"  Assessment & Plan     Vertigo  BPPV - right.  Unable to check left.   Referral to PT for Canolith repositioning.  If not improving - consider additional imaging - MRI/MRA.  Cautioned him to change position slowly, use cane/walker as needed.  Re-evaluate if worsening or develops additional symptoms  - Physical Therapy Referral; Future    Renal insufficiency  Repeat today.  Had been out mowing that day, hot, orthostatic.  States eating/drinking ok now.    - Basic metabolic panel; Future     Patient Instructions   Flu shot given.  Referral to PT for inner ear treatments for vertigo.          Evelyn Iniguez MD  [facility] - FADIA Meehan is a 82 year old, presenting for the following health issues:  ER follow up and Imm/Inj (Flu Shot)      HPI     ED/UC Followup:    Facility:  [facility]  Date of visit: 9/14/2022 (onset of symptoms same day)  Reason for visit: Veritgo, dehydration; renal insufficiency  Current Status: still having the vertigo; with change in position    Treated with Meclizine, Prednisone, Sudafed.    Renal function declined - Cr 1.63 - declined from 1.11.  GFR 42 down from 67.  Other labs normal.  CT head negative.  Was orthostatic.  Treated with 2 liters IVF.    Some issues with balance in past several months.    Small improvement.  No new symptoms.  Feels off balance.  Any direction.  Worse with changing position.  Better standing.    No prior MRI, echo, carotid.    No vision changes, numbness/tingling, speech issues.      No nausea, vomiting.  Review of Systems   Constitutional, HEENT, cardiovascular, pulmonary, gi and gu systems are negative, except as otherwise noted.      Objective    /64 (BP Location: Right arm, Patient Position: Sitting, Cuff Size: Adult Regular)   Pulse 72   Temp 98.5  F (36.9  C) (Tympanic)   Ht 1.765 m (5' 9.5\")   Wt 103.9 kg (229 lb)   SpO2 94%   BMI 33.33 kg/m    Body mass index is 33.33 kg/m .  Physical Exam   GENERAL: healthy, " alert and no distress  EYES: Eyes grossly normal to inspection, PERRL and conjunctivae and sclerae normal  HENT: ear canals and TM's normal, nose and mouth without ulcers or lesions  HENT: cerumen excess left  NECK: no adenopathy, no asymmetry, masses, or scars and thyroid normal to palpation  RESP: lungs clear to auscultation - no rales, rhonchi or wheezes  CV: regular rate and rhythm, normal S1 S2, no S3 or S4, no murmur, click or rub, no peripheral edema and peripheral pulses strong  ABDOMEN: soft, nontender, no hepatosplenomegaly, no masses and bowel sounds normal  MS: no gross musculoskeletal defects noted, no edema  NEURO: Normal strength and tone, sensory exam grossly normal, mentation intact, DTR's normal and symmetric and gait abnormal: unable to do tandem; finger nose testing normal  PSYCH: mentation appears normal, affect normal/bright    GEMMA Hallpike - positive right including with presence of nysatgmus; unable to do left given severity of dizziness on left - declined.    Repeat BMP pending.

## 2022-09-30 NOTE — TELEPHONE ENCOUNTER
Next 5 appointments (look out 90 days)      Oct 03, 2022  1:00 PM  (Arrive by 12:45 PM)  SHORT with Evelyn Olson MD  [facility] [place] ([facility] [place] ) [address]  [place] [address]  [phone]

## 2022-10-03 ENCOUNTER — OFFICE VISIT (OUTPATIENT)
Dept: FAMILY MEDICINE | Facility: OTHER | Age: 82
End: 2022-10-03
Attending: FAMILY MEDICINE
Payer: COMMERCIAL

## 2022-10-03 VITALS
TEMPERATURE: 98.5 F | HEIGHT: 70 IN | SYSTOLIC BLOOD PRESSURE: 132 MMHG | OXYGEN SATURATION: 94 % | BODY MASS INDEX: 32.78 KG/M2 | WEIGHT: 229 LBS | DIASTOLIC BLOOD PRESSURE: 64 MMHG | HEART RATE: 72 BPM

## 2022-10-03 DIAGNOSIS — N28.9 RENAL INSUFFICIENCY: ICD-10-CM

## 2022-10-03 DIAGNOSIS — R42 VERTIGO: Primary | ICD-10-CM

## 2022-10-03 DIAGNOSIS — Z23 NEED FOR PROPHYLACTIC VACCINATION AND INOCULATION AGAINST INFLUENZA: ICD-10-CM

## 2022-10-03 LAB
ANION GAP SERPL CALCULATED.3IONS-SCNC: 3 MMOL/L (ref 3–14)
BUN SERPL-MCNC: 20 MG/DL (ref 7–30)
CALCIUM SERPL-MCNC: 9.5 MG/DL (ref 8.5–10.1)
CHLORIDE BLD-SCNC: 107 MMOL/L (ref 94–109)
CO2 SERPL-SCNC: 29 MMOL/L (ref 20–32)
CREAT SERPL-MCNC: 1.24 MG/DL (ref 0.66–1.25)
GFR SERPL CREATININE-BSD FRML MDRD: 59 ML/MIN/1.73M2
GLUCOSE BLD-MCNC: 96 MG/DL (ref 70–99)
POTASSIUM BLD-SCNC: 4.3 MMOL/L (ref 3.4–5.3)
SODIUM SERPL-SCNC: 139 MMOL/L (ref 133–144)

## 2022-10-03 PROCEDURE — 36415 COLL VENOUS BLD VENIPUNCTURE: CPT | Mod: ZL | Performed by: FAMILY MEDICINE

## 2022-10-03 PROCEDURE — 99214 OFFICE O/P EST MOD 30 MIN: CPT | Performed by: FAMILY MEDICINE

## 2022-10-03 PROCEDURE — G0463 HOSPITAL OUTPT CLINIC VISIT: HCPCS

## 2022-10-03 PROCEDURE — G0008 ADMIN INFLUENZA VIRUS VAC: HCPCS

## 2022-10-03 PROCEDURE — G0463 HOSPITAL OUTPT CLINIC VISIT: HCPCS | Mod: 25

## 2022-10-03 PROCEDURE — 82310 ASSAY OF CALCIUM: CPT | Mod: ZL | Performed by: FAMILY MEDICINE

## 2022-10-03 ASSESSMENT — PAIN SCALES - GENERAL: PAINLEVEL: NO PAIN (0)

## 2022-10-03 NOTE — NURSING NOTE
"Chief Complaint   Patient presents with     ER follow up       Initial /64 (BP Location: Right arm, Patient Position: Sitting, Cuff Size: Adult Regular)   Pulse 72   Temp 98.5  F (36.9  C) (Tympanic)   Ht 1.765 m (5' 9.5\")   Wt 103.9 kg (229 lb)   SpO2 94%   BMI 33.33 kg/m   Estimated body mass index is 33.33 kg/m  as calculated from the following:    Height as of this encounter: 1.765 m (5' 9.5\").    Weight as of this encounter: 103.9 kg (229 lb).  Medication Reconciliation: complete  Brynn Barker LPN  "

## 2022-10-04 ENCOUNTER — HOSPITAL ENCOUNTER (OUTPATIENT)
Dept: PHYSICAL THERAPY | Facility: HOSPITAL | Age: 82
Setting detail: THERAPIES SERIES
Discharge: HOME | End: 2022-10-04
Attending: FAMILY MEDICINE
Payer: COMMERCIAL

## 2022-10-04 ENCOUNTER — TELEPHONE (OUTPATIENT)
Dept: FAMILY MEDICINE | Facility: OTHER | Age: 82
End: 2022-10-04

## 2022-10-04 DIAGNOSIS — R42 VERTIGO: ICD-10-CM

## 2022-10-04 PROCEDURE — 999N000214 HC STATISTIC PT OUTPT VISIT: Performed by: PHYSICAL THERAPIST

## 2022-10-04 PROCEDURE — 97162 PT EVAL MOD COMPLEX 30 MIN: CPT | Mod: GP | Performed by: PHYSICAL THERAPIST

## 2022-10-04 PROCEDURE — 97161 PT EVAL LOW COMPLEX 20 MIN: CPT | Mod: GP | Performed by: PHYSICAL THERAPIST

## 2022-10-04 NOTE — TELEPHONE ENCOUNTER
----- Message from Evelyn Olson MD sent at 10/3/2022  3:30 PM CDT -----  Notify of improvement in labs -close to back to baseline.

## 2022-10-04 NOTE — PROGRESS NOTES
"   10/04/22 1252   Quick Adds   Quick Adds Certification;Vestibular Eval   Type of Visit Initial OP PT Evaluation   General Information   Start of Care Date 10/04/22   Referring Physician Dr. Evelyn Olson   Orders Evaluate and Treat as Indicated   Order Date 10/03/22   Medical Diagnosis vertigo   Onset of illness/injury or Date of Surgery 10/03/22  (date of MD orders)   Surgical/Medical history reviewed Yes   Pertinent history of current problem (include personal factors and/or comorbidities that impact the POC) Pt reports onset of vertigo on 9/14/22, states he woke up in the morning, he felt a little bit dizzy, states he had a hard time getting OOB.  Once he was up states he was able to walk but his balance was off.  Pt was able to mow the lawn, after states he rested in a chair.  Pt states he was partially reclined, states he didn't feel dizziness at rest.  Pt states when he went to stand up he fell back down into chair, states symptoms lasted about 1 minute.  Pt states his wife helped him up again, states he felt unsteady again.  Pt then came into the emergency.  Pt was found to be dehydrated and was told he had some inflammation in his ear.  Pt states when he left the ED he was feeling better but still not 100%.  Pt was also prescribed meclizine, states he used it for about 10 days, noticed no improvement in symptoms with use of meclizine.  Pt states now evertime he lies down, rolls in bed, or gets up from bed he continues to get the brief episodes of vertigo.  Pt states when he is up doing his day to day stuff he states he feels a little \"fuzzy\" in the head but states he can function and do the things he needs to do.  Pt reports around 10 years ago, he had a similar episode occur, pt does not recall what they did for it at the time or how it went away.  Pt denies any recent illness.  Pt denies any falls or jarring of head in recent month.  Pt reports chronic ringing of ears, unchanged with presence of " vertigo.   Pertinent Visual History  wears bifocals, macular degeneration   Prior level of function comment independent   Previous/Current Treatment Medication(s)   Improvement after medication No   Patient role/Employment history Retired   Living environment House/townhome   Home/Community Accessibility Comments stairs within home, no concerns   Patient/Family Goals Statement get rid of dizziness   Fall Risk Screen   Fall screen completed by PT   Have you fallen 2 or more times in the past year? No   Have you fallen and had an injury in the past year? No   Is patient a fall risk? No   Abuse Screen (yes response referral indicated)   Feels Unsafe at Home or Work/School no   Feels Threatened by Someone no   Does Anyone Try to Keep You From Having Contact with Others or Doing Things Outside Your Home? no   Physical Signs of Abuse Present no   System Outcome Measures   Outcome Measures BPPV   Dizziness Handicap Inventory (score out of 100) A decrease in score by 17.18 or greater indicates a clinically significant change in symptoms. 6   Cognitive Status Examination   Orientation orientation to person, place and time   Level of Consciousness alert   Follows Commands and Answers Questions 100% of the time   Personal Safety and Judgment intact   Memory intact   Posture   Posture Forward head position   Range of Motion (ROM)   ROM Quick Adds no deficits were identified   Strength   Manual Muscle Testing Quick Adds No deficits were identified   Bed Mobility   Bed Mobility Comments independent   Transfer Skills   Transfer Comments independent   Gait   Gait Comments ambulates into department independently   Balance   Balance Comments not formally assessed this session due to time constraints   Cervicogenic Screen   Neck ROM WFL for testing   Oculomotor Exam   Smooth Pursuit Abnormal   Smooth Pursuit Comment saccadic intrusions noted   Saccades Normal   VOR Normal   VOR Cancellation Normal   Rapid Head Thrust Comments  difficult to determine if pt had corrective saccades bilaterally or just having difficulty following commands and keeping eyes on target.   Infrared Goggle Exam or Frenzel Lense Exam   Vestibular Suppressant in Last 24 Hours? No   Exam completed with Infrared Goggles   Spontaneous Nystagmus Negative   Gaze Evoked Nystagmus Negative   Gaze Evoked Nystagmus comments with fixation and fixation removed   Head Shake Horizontal Nystagmus Negative   Head Shake Horizontal Nystagmus comments for symptoms or nystagmus   Hill-Hallpike (right) Negative   Marina-Hallpike (right) comments completed x2, second time as loaded hill hallpike.  Negative for symptoms, demonstrates 2-3 beats of downbeating nystagmus but no symptoms and no reversal upon return to sitting - not indicative of BPPV   Marina-Hallpike (Left) Negative   Hill-Hallpike (left) comments completed x2, second time as loaded hill hallpike, negative for symptoms or nystagmus   [facility] Supine Roll Test (Right) Negative   [facility] Supine Roll Test (Right) Comments for symptoms or nystagmus   [facility] Supine Roll Test (Left) Negative   [facility] Supine Roll Test (Left) Comments  for symptoms or nystagmus   Other Infrared Goggle Exam or Frenzel Lense Exam Comments Had pt trial getting in/out of bed and lying down/sitting up as he normally does and was unable to provoke any nystagmus or dizziness with this activity.  No dizziness throughout PT evaluation.   Planned Therapy Interventions   Planned Therapy Interventions Comment repeat positional testing, canalith repositioning if positive with next assessment   Clinical Impression   Criteria for Skilled Therapeutic Interventions Met yes, treatment indicated   PT Diagnosis dizziness with position changes   Influenced by the following impairments dizziness, orthostatic hypotension in ED   Functional limitations due to impairments decreased tolerance for bed mobility   Clinical Presentation Evolving/Changing   Clinical Presentation Rationale clinical  judgement   Clinical Decision Making (Complexity) Moderate complexity   Therapy Frequency 1 time/week   Predicted Duration of Therapy Intervention (days/wks) 8 weeks   Risk & Benefits of therapy have been explained Yes   Patient, Family & other staff in agreement with plan of care Yes   Clinical Impression Comments PT evaluation completed.  Pt presents with nearly 3 week history of short duration dizziness with sup<>sit and rolling in bed primarily.  Pt was seen by primary care provider in clinic yesterday and demonstrated positive right sixto-hallpike but not treated with epley at that time.   Pt presents today with reports of continued dizziness last night and this morning.  Pt's oculomotor exam largely negative, some mild saccadic intrusions with smooth pursuit but could be age related.  Pt's positional testing also negative for symptoms or nystagmus.  Also attempted to provoke symptoms with sup<>sit and rolling on table and unable to provoke any symptoms as well.  Unclear at this time if pt's BPPV has self resolved or if there was another cause of dizziness.  Will repeat testing 1 week from now if pt's symptoms return during the next week.  Vestibular rehab indicated for repeat testing and future treatment if indicate.   Education Assessment   Preferred Learning Style Listening   Barriers to Learning No barriers   GOALS   PT Eval Goals 1;2   Goal 1   Goal Identifier STG 1   Goal Description Repeat positional testing to be completed.   Target Date 10/18/22   Goal 2   Goal Identifier LTG 1   Goal Description Pt to tolerate all bed mobility without episodes of dizziness.   Target Date 11/29/22   Total Evaluation Time   PT Eval, Low Complexity Minutes (83720) 30   Therapy Certification   Certification date from 10/04/22   Certification date to 11/29/22   Medical Diagnosis vertigo   Certification I certify the need for these services furnished under this plan of treatment and while under my care.  (Physician  co-signature of this document indicates review and certification of the therapy plan).

## 2022-10-11 ENCOUNTER — HOSPITAL ENCOUNTER (OUTPATIENT)
Dept: PHYSICAL THERAPY | Facility: HOSPITAL | Age: 82
Setting detail: THERAPIES SERIES
Discharge: HOME | End: 2022-10-11
Attending: FAMILY MEDICINE
Payer: COMMERCIAL

## 2022-10-11 PROCEDURE — 95992 CANALITH REPOSITIONING PROC: CPT | Mod: GP | Performed by: PHYSICAL THERAPIST

## 2022-10-11 PROCEDURE — 999N000104 HC STATISTIC NO CHARGE: Performed by: PHYSICAL THERAPIST

## 2022-10-11 PROCEDURE — 97750 PHYSICAL PERFORMANCE TEST: CPT | Mod: GP | Performed by: PHYSICAL THERAPIST

## 2022-10-11 NOTE — PROGRESS NOTES
10/11/22 1002   Signing Clinician's Name / Credentials   Signing clinician's name / credentials Angeline Castillo DPT   Session Number   Session Number 2 [email or web address]   Subjective Report   Subjective Report Pt states he has had some dizziness with lying down and getting up but not all the time.  Pt states he was working in the basement yesterday and looking up for an extended time and got dizzy, describes it as his balance being off.  Pt states it's also that his balance feels off when he first sits up as well.   Treatment Interventions   Interventions Physical Performance Test/Measures;Standard Canalith Repositioning   Oculomotor Exam   Smooth Pursuit Normal   Saccades Normal   VOR Cancellation Normal   Infrared Goggle Exam or Frenzel Lense Exam   Vestibular Suppressant in Last 24 Hours? No   Exam completed with Infrared Goggles   Spontaneous Nystagmus Negative   Gaze Evoked Nystagmus Negative   Gaze Evoked Nystagmus comments with fixation and fixation removed   Head Shake Horizontal Nystagmus Negative   Rogers-Hallpike (right) Upbeating R torsional   Hill-Hallpike (right) comments immediate onset, 18 second duration, symptomatic, reversal and symptomatic upon return to sitting   Hill-Hallpike (Left) Negative   [facility] Test (Right) Negative   [facility] Supine Roll Test (Left) Negative   Physical Performance Test/measures   Physical Performance Test/Measurement, Minutes (67452) 20   Skilled Intervention repeat vestibular testing   Patient Response dizziness   Treatment Detail see above   Standard Canalith Repositioning   Standard canalith repositioning procedure minutes (66293) 24   Skilled Intervention epley   Patient Response good   Treatment Detail Pt treated with right epley x3.  Pt negative for symptoms or nystagmus on second time through.  Third time through pt had delay of nearly 30 seconds before onset of 2 faint upbeating right torsional nystagmus and mild symptoms, brought through full epley without further  symptoms.  Pt educated on importance of increasing water intake   Assessments Completed   Assessments Completed Pt returns today with continued symptoms consistent with BPPV.  Testing today did provoke a nystagmus and positive for right posterior canalithiasis.  Pt treated with right epley and provided with education on findings.   Plan   Plan for next session Repeat testing and treat as indicated   Total Session Time   Timed Code Treatment Minutes 20   Total Treatment Time (sum of timed and untimed services) 44

## 2022-10-25 NOTE — PROGRESS NOTES
"  Assessment & Plan     Chronic pain syndrome  Stable.   Happy with current relief with Lyrica.  UDS obtained.  Contract signed.  - TSH with free T4 reflex; Future  - Drug Confirmation Panel Urine with Creat; Future  - TSH with free T4 reflex  - Drug Confirmation Panel Urine with Creat    Chronic, continuous use of opioids  As above.  - TSH with free T4 reflex; Future  - Drug Confirmation Panel Urine with Creat; Future  - TSH with free T4 reflex  - Drug Confirmation Panel Urine with Creat    Essential hypertension  Stable at goal.  - TSH with free T4 reflex; Future  - TSH with free T4 reflex    Hyperlipidemia, unspecified hyperlipidemia type  Non-fasting labs obtained.  Continue statin.  - TSH with free T4 reflex; Future  - Lipid Profile (Chol, Trig, HDL, LDL calc); Future  - TSH with free T4 reflex  - Lipid Profile (Chol, Trig, HDL, LDL calc)    Elevated fasting glucose  Update a1c.  - Hemoglobin A1c; Future  - TSH with free T4 reflex; Future  - Basic metabolic panel; Future  - Hemoglobin A1c  - TSH with free T4 reflex  - Basic metabolic panel    Adjustment disorder with depressed mood  Stable.  - TSH with free T4 reflex; Future  - TSH with free T4 reflex    Obesity (BMI 30.0-34.9)  Stable.  - TSH with free T4 reflex; Future  - TSH with free T4 reflex    Elevated serum creatinine  Was elevated with prior illness, then resolved.  Update today.  - TSH with free T4 reflex; Future  - Basic metabolic panel; Future  - Albumin Random Urine Quantitative with Creat Ratio; Future  - TSH with free T4 reflex  - Basic metabolic panel  - Albumin Random Urine Quantitative with Creat Ratio       BMI:   Estimated body mass index is 34.18 kg/m  as calculated from the following:    Height as of this encounter: 1.765 m (5' 9.5\").    Weight as of this encounter: 106.5 kg (234 lb 12.8 oz).   Weight management plan: Discussed healthy diet and exercise guidelines    Patient Instructions   Contract for Lyrica completed.  Will call with lab " results.  Continue current medications.  Follow up 3 months.      Return in about 3 months (around 2023) for chronic pain.    Evelyn Iniguez MD  [facility] - FADIA Meehan is a 82 year old presenting for the following health issues:  chronic pain, Lipids, Hypertension, and Depression      HPI     Hyperlipidemia Follow-Up and prediabetes    Are you regularly taking any medication or supplement to lower your cholesterol?   Yes- lipitor    Are you having muscle aches or other side effects that you think could be caused by your cholesterol lowering medication?  No    Hypertension Follow-up    Do you check your blood pressure regularly outside of the clinic? Yes     Are you following a low salt diet? Yes    Are your blood pressures ever more than 140 on the top number (systolic) OR more   than 90 on the bottom number (diastolic), for example 140/90? No   120s/60-70s  Prior dizziness - vertigo - completed PT sessions - resolved.    Depression Followup    How are you doing with your depression since your last visit? Improved     Are you having other symptoms that might be associated with depression? No    Have you had a significant life event?  No     Are you feeling anxious or having panic attacks?   No    Do you have any concerns with your use of alcohol or other drugs? No    Social History     Tobacco Use     Smoking status: Former     Types: Cigarettes     Quit date: 1992     Years since quittin.8     Smokeless tobacco: Never   Substance Use Topics     Alcohol use: Never     Drug use: No     PHQ 2020 9/3/2021 10/27/2022   PHQ-9 Total Score 0 5 0   Q9: Thoughts of better off dead/self-harm past 2 weeks Not at all Not at all Not at all     ROBYN-7 SCORE 2020 9/3/2021 10/27/2022   Total Score 0 1 0     Last PHQ-9 10/27/2022   1.  Little interest or pleasure in doing things 0   2.  Feeling down, depressed, or hopeless 0   3.  Trouble falling or staying asleep, or  "sleeping too much 0   4.  Feeling tired or having little energy 0   5.  Poor appetite or overeating 0   6.  Feeling bad about yourself 0   7.  Trouble concentrating 0   8.  Moving slowly or restless 0   Q9: Thoughts of better off dead/self-harm past 2 weeks 0   PHQ-9 Total Score 0   Difficulty at work, home, or with people -     ROBYN-7  10/27/2022   1. Feeling nervous, anxious, or on edge 0   2. Not being able to stop or control worrying 0   3. Worrying too much about different things 0   4. Trouble relaxing 0   5. Being so restless that it is hard to sit still 0   6. Becoming easily annoyed or irritable 0   7. Feeling afraid, as if something awful might happen 0   ROBYN-7 Total Score 0   If you checked any problems, how difficult have they made it for you to do your work, take care of things at home, or get along with other people? -       Suicide Assessment Five-step Evaluation and Treatment (SAFE-T)      Chronic/Recurring Back Pain Follow Up - Lyrica 150 mg BID restarted 7/2022    Where is your back pain located? (Select all that apply) low back bilateral    How would you describe your back pain?  shooting    Where does your back pain spread? Left leg and hip area    Since your last clinic visit for back pain, how has your pain changed? always present, but gets better and worse    Does your back pain interfere with your job? Not applicable    Since your last visit, have you tried any new treatment? No     Lyrica helpful - less leg pain    Ongoing stable back    No falls    Lawn work completed      Review of Systems   Constitutional, HEENT, cardiovascular, pulmonary, gi and gu systems are negative, except as otherwise noted.      Objective    /60 (BP Location: Right arm, Patient Position: Sitting, Cuff Size: Adult Regular)   Pulse 64   Temp 98.2  F (36.8  C) (Tympanic)   Ht 1.765 m (5' 9.5\")   Wt 106.5 kg (234 lb 12.8 oz)   SpO2 97%   BMI 34.18 kg/m    Body mass index is 34.18 kg/m .  Physical Exam "   GENERAL: healthy, alert and no distress  EYES: Eyes grossly normal to inspection, PERRL and conjunctivae and sclerae normal  NECK: no adenopathy, no asymmetry, masses, or scars and thyroid normal to palpation  RESP: lungs clear to auscultation - no rales, rhonchi or wheezes  CV: regular rate and rhythm, normal S1 S2, no S3 or S4, no murmur, click or rub, no peripheral edema and peripheral pulses strong  ABDOMEN: soft, nontender, no hepatosplenomegaly, no masses and bowel sounds normal  MS: no gross musculoskeletal defects noted, no edema  NEURO: Normal strength and tone, mentation intact and speech normal  PSYCH: mentation appears normal, affect normal/bright    Lab pending;.

## 2022-10-27 ENCOUNTER — OFFICE VISIT (OUTPATIENT)
Dept: FAMILY MEDICINE | Facility: OTHER | Age: 82
End: 2022-10-27
Attending: FAMILY MEDICINE
Payer: COMMERCIAL

## 2022-10-27 VITALS
WEIGHT: 234.8 LBS | SYSTOLIC BLOOD PRESSURE: 130 MMHG | TEMPERATURE: 98.2 F | BODY MASS INDEX: 33.61 KG/M2 | HEIGHT: 70 IN | OXYGEN SATURATION: 97 % | HEART RATE: 64 BPM | DIASTOLIC BLOOD PRESSURE: 60 MMHG

## 2022-10-27 DIAGNOSIS — E66.811 OBESITY (BMI 30.0-34.9): ICD-10-CM

## 2022-10-27 DIAGNOSIS — R79.89 ELEVATED SERUM CREATININE: ICD-10-CM

## 2022-10-27 DIAGNOSIS — G89.4 CHRONIC PAIN SYNDROME: Primary | ICD-10-CM

## 2022-10-27 DIAGNOSIS — F43.21 ADJUSTMENT DISORDER WITH DEPRESSED MOOD: ICD-10-CM

## 2022-10-27 DIAGNOSIS — E78.5 HYPERLIPIDEMIA, UNSPECIFIED HYPERLIPIDEMIA TYPE: ICD-10-CM

## 2022-10-27 DIAGNOSIS — R73.01 ELEVATED FASTING GLUCOSE: ICD-10-CM

## 2022-10-27 DIAGNOSIS — F11.90 CHRONIC, CONTINUOUS USE OF OPIOIDS: ICD-10-CM

## 2022-10-27 DIAGNOSIS — I10 ESSENTIAL HYPERTENSION: ICD-10-CM

## 2022-10-27 LAB
ANION GAP SERPL CALCULATED.3IONS-SCNC: 8 MMOL/L (ref 7–15)
BUN SERPL-MCNC: 22.2 MG/DL (ref 8–23)
CALCIUM SERPL-MCNC: 9.6 MG/DL (ref 8.8–10.2)
CHLORIDE SERPL-SCNC: 105 MMOL/L (ref 98–107)
CHOLEST SERPL-MCNC: 122 MG/DL
CREAT SERPL-MCNC: 1.22 MG/DL (ref 0.67–1.17)
CREAT UR-MCNC: 164 MG/DL
CREAT UR-MCNC: 164 MG/DL
DEPRECATED HCO3 PLAS-SCNC: 29 MMOL/L (ref 22–29)
EST. AVERAGE GLUCOSE BLD GHB EST-MCNC: 120 MG/DL
GFR SERPL CREATININE-BSD FRML MDRD: 60 ML/MIN/1.73M2
GLUCOSE SERPL-MCNC: 103 MG/DL (ref 70–99)
HBA1C MFR BLD: 5.8 %
HDLC SERPL-MCNC: 30 MG/DL
LDLC SERPL CALC-MCNC: 34 MG/DL
MICROALBUMIN UR-MCNC: <12 MG/L
MICROALBUMIN/CREAT UR: NORMAL MG/G{CREAT}
NONHDLC SERPL-MCNC: 92 MG/DL
POTASSIUM SERPL-SCNC: 4.9 MMOL/L (ref 3.4–5.3)
SODIUM SERPL-SCNC: 142 MMOL/L (ref 136–145)
TRIGL SERPL-MCNC: 289 MG/DL
TSH SERPL DL<=0.005 MIU/L-ACNC: 1.31 UIU/ML (ref 0.3–4.2)

## 2022-10-27 PROCEDURE — 80048 BASIC METABOLIC PNL TOTAL CA: CPT | Mod: ZL | Performed by: FAMILY MEDICINE

## 2022-10-27 PROCEDURE — 80061 LIPID PANEL: CPT | Mod: ZL | Performed by: FAMILY MEDICINE

## 2022-10-27 PROCEDURE — 36415 COLL VENOUS BLD VENIPUNCTURE: CPT | Mod: ZL | Performed by: FAMILY MEDICINE

## 2022-10-27 PROCEDURE — G0463 HOSPITAL OUTPT CLINIC VISIT: HCPCS

## 2022-10-27 PROCEDURE — 80307 DRUG TEST PRSMV CHEM ANLYZR: CPT | Mod: ZL | Performed by: FAMILY MEDICINE

## 2022-10-27 PROCEDURE — 82043 UR ALBUMIN QUANTITATIVE: CPT | Mod: ZL | Performed by: FAMILY MEDICINE

## 2022-10-27 PROCEDURE — 83036 HEMOGLOBIN GLYCOSYLATED A1C: CPT | Mod: ZL | Performed by: FAMILY MEDICINE

## 2022-10-27 PROCEDURE — 84443 ASSAY THYROID STIM HORMONE: CPT | Mod: ZL | Performed by: FAMILY MEDICINE

## 2022-10-27 PROCEDURE — 99214 OFFICE O/P EST MOD 30 MIN: CPT | Performed by: FAMILY MEDICINE

## 2022-10-27 ASSESSMENT — ANXIETY QUESTIONNAIRES
GAD7 TOTAL SCORE: 0
GAD7 TOTAL SCORE: 0
1. FEELING NERVOUS, ANXIOUS, OR ON EDGE: NOT AT ALL
4. TROUBLE RELAXING: NOT AT ALL
3. WORRYING TOO MUCH ABOUT DIFFERENT THINGS: NOT AT ALL
5. BEING SO RESTLESS THAT IT IS HARD TO SIT STILL: NOT AT ALL
6. BECOMING EASILY ANNOYED OR IRRITABLE: NOT AT ALL
2. NOT BEING ABLE TO STOP OR CONTROL WORRYING: NOT AT ALL
7. FEELING AFRAID AS IF SOMETHING AWFUL MIGHT HAPPEN: NOT AT ALL

## 2022-10-27 ASSESSMENT — PATIENT HEALTH QUESTIONNAIRE - PHQ9: SUM OF ALL RESPONSES TO PHQ QUESTIONS 1-9: 0

## 2022-10-27 ASSESSMENT — PAIN SCALES - GENERAL: PAINLEVEL: NO PAIN (0)

## 2022-10-27 NOTE — NURSING NOTE
"Chief Complaint   Patient presents with     chronic pain     Lipids     Hypertension     Depression       Initial /60 (BP Location: Right arm, Patient Position: Sitting, Cuff Size: Adult Regular)   Pulse 64   Temp 98.2  F (36.8  C) (Tympanic)   Ht 1.765 m (5' 9.5\")   Wt 106.5 kg (234 lb 12.8 oz)   SpO2 97%   BMI 34.18 kg/m   Estimated body mass index is 34.18 kg/m  as calculated from the following:    Height as of this encounter: 1.765 m (5' 9.5\").    Weight as of this encounter: 106.5 kg (234 lb 12.8 oz).  Medication Reconciliation: complete  Brynn Barker LPN  "

## 2022-10-27 NOTE — PATIENT INSTRUCTIONS
Contract for Lyrica completed.  Will call with lab results.  Continue current medications.  Follow up 3 months.

## 2022-10-27 NOTE — LETTER
RANGE [facility]  10/27/22  Patient: Zoran Granado  YOB: 1940  Medical Record Number: [id number]                                                                                  Non-Opioid Controlled Substance Agreement    This is an agreement between you and your provider regarding safe and appropriate use of controlled substances prescribed by your care team. Controlled substances are?medicines that can cause physical and mental dependence (abuse).     There are strict laws about having and using these medicines. We here at [facility] are  committed to working with you in your efforts to get better. To support you in this work, we'll help you schedule regular office appointments for medicine refills. If we must cancel or change your appointment for any reason, we'll make sure you have enough medicine to last until your next appointment.     As a Provider, I will:     Listen carefully to your concerns while treating you with respect.     Recommend a treatment plan that I believe is in your best interest and may involve therapies other than medicine.      Talk with you often about the possible benefits and the risk of harm of any medicine that we prescribe for you.    Assess the safety of this medicine and check how well it works.      Provide a plan on how to taper (discontinue or go off) using this medicine if the decision is made to stop its use.      ::  As a Patient, I understand controlled substances:       Are prescribed by my care provider to help me function or work and manage my condition(s).?    Are strong medicines and can cause serious side effects.       Need to be taken exactly as prescribed.?Combining controlled substances with certain medicines or chemicals (such as illegal drugs, alcohol, sedatives, sleeping pills, and benzodiazepines) can be dangerous or even fatal.? If I stop taking my medicines suddenly, I may have severe withdrawal symptoms.     The risks, benefits,  and side effects of these medicine(s) were explained to me. I agree that:    1. I will take part in other treatments as advised by my care team. This may be psychiatry or counseling, physical therapy, behavioral therapy, group treatment or a referral to specialist.    2. I will keep all my appointments and understand this is part of the monitoring of controlled substances.?My care team may require an office visit for EVERY controlled substance refill. If I miss appointments or don t follow instructions, my care team may stop my medicine    3. I will take my medicines as prescribed. I will not change the dose or schedule unless my care team tells me to. There will be no refills if I run out early.      4. I may be asked to come to the clinic and complete a urine drug test or complete a pill count. If I don t give a urine sample or participate in a pill count, the care team may stop my medicine.    5. I will only receive controlled substance prescriptions from this clinic. If I am treated by another provider, I will tell them that I am taking controlled substances and that I have a treatment agreement with this provider. I will inform my [facility] care team within one business day if I am given a prescription for any controlled substance by another healthcare provider. My [facility] care team can contact other providers and pharmacists about my use of any medicines.    6. It is up to me to make sure that I don't run out of my medicines on weekends or holidays.?If my care team is willing to refill my prescription without a visit, I must request refills only during office hours. Refills may take up to 3 business days to process. I will use one pharmacy to fill all my controlled substance prescriptions. I will notify the clinic about any changes to my insurance or medicine availability.    7. I am responsible for my prescriptions. If the medicine/prescription is lost, stolen or destroyed, it will not be  replaced.?I also agree not to share controlled substance medicines with anyone.     8. I am aware I should not use any illegal or recreational drugs. I agree not to drink alcohol unless my care team says I can.     9. If I enroll in the [place] Medical Cannabis program, I will tell my care team before my next refill.    10. I will tell my care team right away if I become pregnant, have a new medical problem treated outside of my regular clinic, or have a change in my medicines.     11. I understand that this medicine can affect my thinking, judgment and reaction time.? Alcohol and drugs affect the brain and body, which can affect the safety of my driving. Being under the influence of alcohol or drugs can affect my decision-making, behaviors, personal safety and the safety of others. Driving while impaired (DWI) can occur if a person is driving, operating or in physical control of a car, motorcycle, boat, snowmobile, ATV, motorbike, off-road vehicle or any other motor vehicle ([place] Statute 169A.20). I understand the risk if I choose to drive or operate any vehicle or machinery.    I understand that if I do not follow any of the conditions above, my prescriptions or treatment may be stopped or changed.   I agree that my provider, clinic care team and pharmacy may work with any city, state or federal law enforcement agency that investigates the misuse, sale or other diversion of my controlled medicine. I will allow my provider to discuss my care with, or share a copy of, this agreement with any other treating provider, pharmacy or emergency room where I receive care.     I have read this agreement and have asked questions about anything I did not understand.    ________________________________________________________  Patient Signature - Zoran Granado     ___________________                   Date     ________________________________________________________  Provider Signature - Evelyn Iniguez MD        ___________________                   Date     ________________________________________________________  Witness Signature (required if provider not present while patient signing)          ___________________                   Date

## 2022-10-31 LAB — PREGABALIN UR QL CFM: PRESENT

## 2022-11-27 DIAGNOSIS — E78.5 HYPERLIPIDEMIA, UNSPECIFIED HYPERLIPIDEMIA TYPE: ICD-10-CM

## 2022-11-28 RX ORDER — ATORVASTATIN CALCIUM 80 MG/1
TABLET, FILM COATED ORAL
Qty: 90 TABLET | Refills: 0 | Status: SHIPPED | OUTPATIENT
Start: 2022-11-28 | End: 2023-01-30

## 2022-11-28 NOTE — TELEPHONE ENCOUNTER
Lipitor       Last Written Prescription Date:  10/25/21  Last Fill Quantity: 90,   # refills: 3  Last Office Visit: 10/27/22  Future Office visit:    Next 5 appointments (look out 90 days)    Jan 30, 2023  9:45 AM  (Arrive by 9:30 AM)  SHORT with Evelyn Olson MD  [facility] [place] ([facility] [place] ) [address]  [place] [address]  [phone]

## 2022-12-21 DIAGNOSIS — I10 ESSENTIAL HYPERTENSION: ICD-10-CM

## 2022-12-23 DIAGNOSIS — I10 ESSENTIAL HYPERTENSION: ICD-10-CM

## 2022-12-23 DIAGNOSIS — R60.0 EDEMA, LOWER EXTREMITY: ICD-10-CM

## 2022-12-23 RX ORDER — AMLODIPINE BESYLATE 10 MG/1
TABLET ORAL
Qty: 90 TABLET | Refills: 0 | Status: SHIPPED | OUTPATIENT
Start: 2022-12-23 | End: 2023-01-30

## 2022-12-23 NOTE — TELEPHONE ENCOUNTER
Norvasc      Last Written Prescription Date:  03/22/22  Last Fill Quantity: 90,   # refills: 2  Last Office Visit: 10/27/22  Future Office visit:    Next 5 appointments (look out 90 days)    Jan 30, 2023  9:45 AM  (Arrive by 9:30 AM)  SHORT with Evelyn Olson MD  [facility] [place] ([facility] [place] ) [address]  [place] [address]  [phone]

## 2022-12-26 ENCOUNTER — NURSE TRIAGE (OUTPATIENT)
Dept: NURSING | Facility: CLINIC | Age: 82
End: 2022-12-26

## 2022-12-26 ENCOUNTER — APPOINTMENT (OUTPATIENT)
Dept: GENERAL RADIOLOGY | Facility: HOSPITAL | Age: 82
End: 2022-12-26
Attending: EMERGENCY MEDICINE
Payer: COMMERCIAL

## 2022-12-26 ENCOUNTER — HOSPITAL ENCOUNTER (EMERGENCY)
Facility: HOSPITAL | Age: 82
Discharge: HOME | End: 2022-12-26
Attending: EMERGENCY MEDICINE | Admitting: EMERGENCY MEDICINE
Payer: COMMERCIAL

## 2022-12-26 VITALS
SYSTOLIC BLOOD PRESSURE: 146 MMHG | DIASTOLIC BLOOD PRESSURE: 72 MMHG | WEIGHT: 230 LBS | OXYGEN SATURATION: 93 % | BODY MASS INDEX: 31.15 KG/M2 | HEART RATE: 78 BPM | RESPIRATION RATE: 20 BRPM | HEIGHT: 72 IN | TEMPERATURE: 99.4 F

## 2022-12-26 DIAGNOSIS — U07.1 INFECTION DUE TO 2019 NOVEL CORONAVIRUS: Primary | ICD-10-CM

## 2022-12-26 LAB
ALBUMIN SERPL BCG-MCNC: 4 G/DL (ref 3.5–5.2)
ALP SERPL-CCNC: 79 U/L (ref 40–129)
ALT SERPL W P-5'-P-CCNC: 21 U/L (ref 10–50)
ANION GAP SERPL CALCULATED.3IONS-SCNC: 12 MMOL/L (ref 7–15)
AST SERPL W P-5'-P-CCNC: 23 U/L (ref 10–50)
BASE EXCESS BLDV CALC-SCNC: 2.3 MMOL/L (ref -7.7–1.9)
BASOPHILS # BLD AUTO: 0 10E3/UL (ref 0–0.2)
BASOPHILS NFR BLD AUTO: 1 %
BILIRUB SERPL-MCNC: 0.3 MG/DL
BUN SERPL-MCNC: 18.1 MG/DL (ref 8–23)
CALCIUM SERPL-MCNC: 9.2 MG/DL (ref 8.8–10.2)
CHLORIDE SERPL-SCNC: 102 MMOL/L (ref 98–107)
CREAT SERPL-MCNC: 1.31 MG/DL (ref 0.67–1.17)
DEPRECATED HCO3 PLAS-SCNC: 25 MMOL/L (ref 22–29)
EOSINOPHIL # BLD AUTO: 0.1 10E3/UL (ref 0–0.7)
EOSINOPHIL NFR BLD AUTO: 1 %
ERYTHROCYTE [DISTWIDTH] IN BLOOD BY AUTOMATED COUNT: 13.8 % (ref 10–15)
FLUAV RNA SPEC QL NAA+PROBE: NEGATIVE
FLUBV RNA RESP QL NAA+PROBE: NEGATIVE
GFR SERPL CREATININE-BSD FRML MDRD: 55 ML/MIN/1.73M2
GLUCOSE SERPL-MCNC: 105 MG/DL (ref 70–99)
HCO3 BLDV-SCNC: 28 MMOL/L (ref 21–28)
HCT VFR BLD AUTO: 40.7 % (ref 40–53)
HGB BLD-MCNC: 13.5 G/DL (ref 13.3–17.7)
IMM GRANULOCYTES # BLD: 0 10E3/UL
IMM GRANULOCYTES NFR BLD: 1 %
INR PPP: 1.14 (ref 0.85–1.15)
LYMPHOCYTES # BLD AUTO: 0.5 10E3/UL (ref 0.8–5.3)
LYMPHOCYTES NFR BLD AUTO: 8 %
MCH RBC QN AUTO: 29.5 PG (ref 26.5–33)
MCHC RBC AUTO-ENTMCNC: 33.2 G/DL (ref 31.5–36.5)
MCV RBC AUTO: 89 FL (ref 78–100)
MONOCYTES # BLD AUTO: 1 10E3/UL (ref 0–1.3)
MONOCYTES NFR BLD AUTO: 15 %
NEUTROPHILS # BLD AUTO: 5 10E3/UL (ref 1.6–8.3)
NEUTROPHILS NFR BLD AUTO: 74 %
NRBC # BLD AUTO: 0 10E3/UL
NRBC BLD AUTO-RTO: 0 /100
O2/TOTAL GAS SETTING VFR VENT: 21 %
OXYHGB MFR BLDV: 74 % (ref 70–75)
PCO2 BLDV: 44 MM HG (ref 40–50)
PH BLDV: 7.41 [PH] (ref 7.32–7.43)
PLATELET # BLD AUTO: 217 10E3/UL (ref 150–450)
PO2 BLDV: 39 MM HG (ref 25–47)
POTASSIUM SERPL-SCNC: 4.1 MMOL/L (ref 3.4–5.3)
PROT SERPL-MCNC: 6.5 G/DL (ref 6.4–8.3)
RBC # BLD AUTO: 4.58 10E6/UL (ref 4.4–5.9)
RSV RNA SPEC NAA+PROBE: NEGATIVE
SARS-COV-2 RNA RESP QL NAA+PROBE: POSITIVE
SODIUM SERPL-SCNC: 139 MMOL/L (ref 136–145)
WBC # BLD AUTO: 6.6 10E3/UL (ref 4–11)

## 2022-12-26 PROCEDURE — 85610 PROTHROMBIN TIME: CPT | Performed by: EMERGENCY MEDICINE

## 2022-12-26 PROCEDURE — 36415 COLL VENOUS BLD VENIPUNCTURE: CPT | Performed by: EMERGENCY MEDICINE

## 2022-12-26 PROCEDURE — 82805 BLOOD GASES W/O2 SATURATION: CPT | Performed by: EMERGENCY MEDICINE

## 2022-12-26 PROCEDURE — 87637 SARSCOV2&INF A&B&RSV AMP PRB: CPT | Performed by: EMERGENCY MEDICINE

## 2022-12-26 PROCEDURE — 71045 X-RAY EXAM CHEST 1 VIEW: CPT

## 2022-12-26 PROCEDURE — 99284 EMERGENCY DEPT VISIT MOD MDM: CPT | Mod: 25

## 2022-12-26 PROCEDURE — 80053 COMPREHEN METABOLIC PANEL: CPT | Performed by: EMERGENCY MEDICINE

## 2022-12-26 PROCEDURE — 99284 EMERGENCY DEPT VISIT MOD MDM: CPT | Mod: CS | Performed by: EMERGENCY MEDICINE

## 2022-12-26 PROCEDURE — 250N000011 HC RX IP 250 OP 636: Performed by: EMERGENCY MEDICINE

## 2022-12-26 PROCEDURE — 96374 THER/PROPH/DIAG INJ IV PUSH: CPT

## 2022-12-26 PROCEDURE — 85025 COMPLETE CBC W/AUTO DIFF WBC: CPT | Performed by: EMERGENCY MEDICINE

## 2022-12-26 PROCEDURE — C9803 HOPD COVID-19 SPEC COLLECT: HCPCS

## 2022-12-26 RX ORDER — NIRMATRELVIR AND RITONAVIR 150-100 MG
2 KIT ORAL 2 TIMES DAILY
Qty: 20 TABLET | Refills: 0 | Status: SHIPPED | OUTPATIENT
Start: 2022-12-26 | End: 2022-12-31

## 2022-12-26 RX ORDER — KETOROLAC TROMETHAMINE 15 MG/ML
15 INJECTION, SOLUTION INTRAMUSCULAR; INTRAVENOUS ONCE
Status: COMPLETED | OUTPATIENT
Start: 2022-12-26 | End: 2022-12-26

## 2022-12-26 RX ORDER — SODIUM CHLORIDE 9 MG/ML
INJECTION, SOLUTION INTRAVENOUS CONTINUOUS
Status: DISCONTINUED | OUTPATIENT
Start: 2022-12-26 | End: 2022-12-26 | Stop reason: HOSPADM

## 2022-12-26 RX ADMIN — KETOROLAC TROMETHAMINE 15 MG: 15 INJECTION, SOLUTION INTRAMUSCULAR; INTRAVENOUS at 13:24

## 2022-12-26 ASSESSMENT — ENCOUNTER SYMPTOMS
FATIGUE: 1
ENDOCRINE NEGATIVE: 1
GASTROINTESTINAL NEGATIVE: 1
NEUROLOGICAL NEGATIVE: 1
FEVER: 1
COUGH: 1
MUSCULOSKELETAL NEGATIVE: 1
CARDIOVASCULAR NEGATIVE: 1
EYES NEGATIVE: 1

## 2022-12-26 NOTE — ED TRIAGE NOTES
pt arrives via ambulance with c/o fever since yesterday, temp at 102.7 pt did take tylenol around noon. Also cough and recent covid exposure.

## 2022-12-26 NOTE — TELEPHONE ENCOUNTER
Started with a cough yesterday, dry cough. He's very weak today. He's 102.4 now. He's very weak. She had to help him get up (he was unable to do that himself from the couch). She had to help walk to the bedroom, she was supporting him. She will call 911 for transport to the ER now.  Digna Daily RN  [facility]      Reason for Disposition    SEVERE weakness (i.e., unable to walk or barely able to walk, requires support) and new-onset or worsening    Additional Information    Negative: SEVERE difficulty breathing (e.g., struggling for each breath, speaks in single words)    Negative: Shock suspected (e.g., cold/pale/clammy skin, too weak to stand, low BP, rapid pulse)    Negative: Difficult to awaken or acting confused (e.g., disoriented, slurred speech)    Negative: Fainted > 15 minutes ago and still feels too weak or dizzy to stand    Protocols used: WEAKNESS (GENERALIZED) AND FATIGUE-A-OH

## 2022-12-26 NOTE — ED PROVIDER NOTES
History     Chief Complaint   Patient presents with     Fever     Cough     HPI  Zoran Granado is a 82 year old male who is transported to the emergency department by EMS with fever and cough for the past few days.  Patient states that his granddaughter was recently diagnosed with COVID.  He states he has received COVID vaccines.  He states that he is mildly short of breath as well.  He states his cough is nonproductive.  He states he had a temperature of 102 at home and took Tylenol.  He denies headache.  He denies sinus pain or sore throat.  He states he is not currently having pain in his chest.  He denies abdominal pain.  He said no nausea or vomiting.  He has had no recent change in his bowel or bladder habits.    Allergies:  Allergies   Allergen Reactions     Seasonal Allergies        Problem List:    Patient Active Problem List    Diagnosis Date Noted     Coronary atherosclerosis 06/14/2022     Priority: Medium     Sleep apnea 06/14/2022     Priority: Medium     Formatting of this note might be different from the original.  noncompliant w/CPAP       Essential hypertension 06/24/2021     Priority: Medium     Constipation 02/03/2021     Priority: Medium     Obesity (BMI 30.0-34.9) 02/14/2020     Priority: Medium     Obesity (BMI 35.0-39.9) with comorbidity (H) 10/25/2019     Priority: Medium     Chronic pain syndrome 07/25/2019     Priority: Medium     Hyperlipidemia, unspecified hyperlipidemia type 07/25/2019     Priority: Medium     Chronic, continuous use of opioids 07/25/2019     Priority: Medium     Patient is followed by Evelyn Iniguez MD for ongoing prescription of pain medication.  All refills should only be approved by this provider, or covering partner.    Medication(s): Norco 10/325mg.   Maximum quantity per month: #95  Clinic visit frequency required: Q 3 months      Controlled substance agreement:  Encounter-Level CSA:    There are no encounter-level csa.     Patient-Level CSA:     Controlled Substance Agreement - Opioid - Scan on 7/26/2019 12:01 PM: OPIOD/OPIOD PLUS CONTROLLED SUBSTANCE AGREEMENT (below)         Pain Clinic evaluation in the past: No    DIRE Total Score(s):  No flowsheet data found.    Last [facility] website verification:  done on 7.25.19.   [email or web address]         Acute pain of left shoulder 10/04/2018     Priority: Medium     Muscle weakness 10/04/2018     Priority: Medium     Shoulder stiffness, left 10/04/2018     Priority: Medium     Impingement syndrome of left shoulder 08/03/2018     Priority: Medium     Class 1 obesity without serious comorbidity with body mass index (BMI) of 33.0 to 33.9 in adult, unspecified obesity type 06/12/2018     Priority: Medium     Advance Care Planning 08/22/2017     Priority: Medium     Multiple lipomas 09/05/2013     Priority: Medium     Elevated fasting glucose 04/14/2011     Priority: Medium     IMO Update 10/11       Cervicalgia 04/07/2010     Priority: Medium     IMO Update 10/11       HCD (health care directive) 10/08/2009     Priority: Medium     IMO Regulatory Load OCT 2020       Adjustment disorder with depressed mood 08/23/2007     Priority: Medium     Degeneration of cervical intervertebral disc 08/23/2007     Priority: Medium     IMO Update 10/11       Degeneration of lumbar or lumbosacral intervertebral disc 08/23/2007     Priority: Medium     IMO Update 10/11       Thoracic and lumbosacral neuritis 08/23/2007     Priority: Medium     IMO Update 10/11       Lumbago 04/23/2007     Priority: Medium     IMO Update 10/11       Sciatica 03/28/2006     Priority: Medium     Left       Esophageal reflux 08/31/2004     Priority: Medium     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 Degenerative.   IMO Update 10/11          Past Medical History:    Past Medical History:   Diagnosis Date     Cataract      Chronic back pain      DDD (degenerative disc disease), cervical      DDD  (degenerative disc disease), lumbar      GERD (gastroesophageal reflux disease)      Hyperlipidemia      Lumbar radiculopathy      Macular degeneration      Prediabetes      Trochanteric bursitis of both hips        Past Surgical History:    Past Surgical History:   Procedure Laterality Date     APPENDECTOMY       ARTHROSCOPY KNEE BILATERAL       BACK SURGERY      lumbar L5/S1     CATARACT IOL, RT/LT Bilateral      COLONOSCOPY       COLONOSCOPY - HIM SCAN  2004    Colonoscopy & Polypectomy - a small polyp.  Path - benign colonic mucosa     disc replacement      lumbar; single level L5/S1; Dr [facility] WITH CATARACT SURGERY      both eyes     NISSEN FUNDOPLICATION       SHOULDER SURGERY      Bilateral; twice left, once right; arthroscopies     SPINE SURGERY  2021    decompression hemilaminotomy descectomy L4/5; posterior spine fusion L4/5, trasnforaminal interbody fusion L4/5, hardware removal L5/S1; Dr [facility]     TONSILLECTOMY         Family History:    Family History   Problem Relation Age of Onset     Diabetes Mother      Heart Disease Mother      Heart Disease Father      Lipids Sister         5 sisters     Lipids Brother         2 brothers     Diabetes Brother      Hypertension Brother      Breast Cancer Sister         breast       Social History:  Marital Status:   [2]  Social History     Tobacco Use     Smoking status: Former     Types: Cigarettes     Quit date: 1992     Years since quittin.0     Smokeless tobacco: Never   Substance Use Topics     Alcohol use: Never     Drug use: No        Medications:    amLODIPine (NORVASC) 10 MG tablet  nirmatrelvir and ritonavir (PAXLOVID, 150/100,) therapy pack  Acetaminophen 325 MG CAPS  aspirin 81 MG tablet  atorvastatin (LIPITOR) 80 MG tablet  calcium carbonate-vitamin D 600-200 MG-UNIT CAPS  diphenhydrAMINE-acetaminophen (TYLENOL PM)  MG tablet  furosemide (LASIX) 20 MG  tablet  losartan (COZAAR) 50 MG tablet  Multiple Vitamins-Minerals (PRESERVISION AREDS 2 PO)  pregabalin (LYRICA) 150 MG capsule          Review of Systems   Constitutional: Positive for fatigue and fever.   HENT: Negative.    Eyes: Negative.    Respiratory: Positive for cough.    Cardiovascular: Negative.    Gastrointestinal: Negative.    Endocrine: Negative.    Genitourinary: Negative.    Musculoskeletal: Negative.    Neurological: Negative.    all other systems reviewed and found unremarkable    Physical Exam   BP: 135/73  Pulse: 95  Temp: 99.4  F (37.4  C)  Resp: 20  Height: 182.9 cm (6')  Weight: 104.3 kg (230 lb)  SpO2: 93 %      Physical Exam 82-year-old gentleman who is awake alert oriented person place and time.  He is very pleasant and cooperative with my exam.  He does not appear in acute distress and he does not appear dyspneic at rest.  HEENT normocephalic extraocular muscles intact and pupils are equally round and reactive to light.  Nasal mucosa is mildly injected.  Tongue midline pill intact oropharynx is mildly injected.  No exudate noted.  Neck is supple for range of motion without pain.  No evidence of nuchal irritation.  There is nontender anterior cervical adenopathy.  Lungs are clear bilaterally.  Heart maintains a regular rate and rhythm.  S1 and S2 sounds are appreciated.  The abdomen is soft and nontender.  No mass no organomegaly no rebound.  Extremities a full range of motion and 5/5 strength.  Brisk capillary refill.  No edema.  Neurologic exam no focal cranial nerve deficit.  Dermatologic exam no diffuse skin rashes or lesions are noted.    ED Course              ED Course as of 12/26/22 1442   Mon Dec 26, 2022   1431 The patient remained very stable throughout his stay in the department.  I discussed lab and x-ray findings with him and his wife.  I will prescribe paxlovid.  I will advise the patient to be rechecked by his primary care provider this                       Results for  orders placed or performed during the hospital encounter of 12/26/22 (from the past 24 hour(s))   Symptomatic Influenza A/B & SARS-CoV2 (COVID-19) Virus PCR Multiplex Nasopharyngeal    Specimen: Nasopharyngeal; Swab   Result Value Ref Range    Influenza A PCR Negative Negative    Influenza B PCR Negative Negative    RSV PCR Negative Negative    SARS CoV2 PCR Positive (A) Negative    Narrative    Testing was performed using the Xpert Xpress CoV2/Flu/RSV Assay on the Cepheid GeneXpert Instrument. This test should be ordered for the detection of SARS-CoV-2 and influenza viruses in individuals who meet clinical and/or epidemiological criteria. Test performance is unknown in asymptomatic patients. This test is for in vitro diagnostic use under the FDA EUA for laboratories certified under CLIA to perform high or moderate complexity testing. This test has not been FDA cleared or approved. A negative result does not rule out the presence of PCR inhibitors in the specimen or target RNA in concentration below the limit of detection for the assay. If only one viral target is positive but coinfection with multiple targets is suspected, the sample should be re-tested with another FDA cleared, approved, or authorized test, if coinfection would change clinical management. This test was validated by the [facility] [employer]. These laboratories are certified under the Clinical Laboratory Improvement Amendments of 1988 (CLIA-88) as qualified to perform high complexity laboratory testing.   CBC with platelets differential    Narrative    The following orders were created for panel order CBC with platelets differential.  Procedure                               Abnormality         Status                     ---------                               -----------         ------                     CBC with platelets and d...[[id number]]  Abnormal            Final result                 Please view results for these tests on the  individual orders.   INR   Result Value Ref Range    INR 1.14 0.85 - 1.15   Comprehensive metabolic panel   Result Value Ref Range    Sodium 139 136 - 145 mmol/L    Potassium 4.1 3.4 - 5.3 mmol/L    Chloride 102 98 - 107 mmol/L    Carbon Dioxide (CO2) 25 22 - 29 mmol/L    Anion Gap 12 7 - 15 mmol/L    Urea Nitrogen 18.1 8.0 - 23.0 mg/dL    Creatinine 1.31 (H) 0.67 - 1.17 mg/dL    Calcium 9.2 8.8 - 10.2 mg/dL    Glucose 105 (H) 70 - 99 mg/dL    Alkaline Phosphatase 79 40 - 129 U/L    AST 23 10 - 50 U/L    ALT 21 10 - 50 U/L    Protein Total 6.5 6.4 - 8.3 g/dL    Albumin 4.0 3.5 - 5.2 g/dL    Bilirubin Total 0.3 <=1.2 mg/dL    GFR Estimate 55 (L) >60 mL/min/1.73m2   Blood gas venous and oxyhgb   Result Value Ref Range    pH Venous 7.41 7.32 - 7.43    pCO2 Venous 44 40 - 50 mm Hg    pO2 Venous 39 25 - 47 mm Hg    Bicarbonate Venous 28 21 - 28 mmol/L    FIO2 21     Oxyhemoglobin Venous 74 70 - 75 %    Base Excess/Deficit (+/-) 2.3 (H) -7.7 - 1.9 mmol/L   CBC with platelets and differential   Result Value Ref Range    WBC Count 6.6 4.0 - 11.0 10e3/uL    RBC Count 4.58 4.40 - 5.90 10e6/uL    Hemoglobin 13.5 13.3 - 17.7 g/dL    Hematocrit 40.7 40.0 - 53.0 %    MCV 89 78 - 100 fL    MCH 29.5 26.5 - 33.0 pg    MCHC 33.2 31.5 - 36.5 g/dL    RDW 13.8 10.0 - 15.0 %    Platelet Count 217 150 - 450 10e3/uL    % Neutrophils 74 %    % Lymphocytes 8 %    % Monocytes 15 %    % Eosinophils 1 %    % Basophils 1 %    % Immature Granulocytes 1 %    NRBCs per 100 WBC 0 <1 /100    Absolute Neutrophils 5.0 1.6 - 8.3 10e3/uL    Absolute Lymphocytes 0.5 (L) 0.8 - 5.3 10e3/uL    Absolute Monocytes 1.0 0.0 - 1.3 10e3/uL    Absolute Eosinophils 0.1 0.0 - 0.7 10e3/uL    Absolute Basophils 0.0 0.0 - 0.2 10e3/uL    Absolute Immature Granulocytes 0.0 <=0.4 10e3/uL    Absolute NRBCs 0.0 10e3/uL   XR Chest Port 1 View    Narrative    PROCEDURE:  XR CHEST PORT 1 VIEW    HISTORY: fever/cough    COMPARISON:  None.    FINDINGS: Single view chest  radiograph  Cardiomediastinal silhouette is within normal limits.  No focal consolidation, effusion or pneumothorax.    No suspicious osseous lesion or subdiaphragmatic free air.      Impression    IMPRESSION:   No acute cardiopulmonary process.      BRAD FLANNERY MD         SYSTEM ID:  [id number]       Medications   0.9% sodium chloride BOLUS (0 mLs Intravenous Stopped 12/26/22 1430)     Followed by   sodium chloride 0.9% infusion (has no administration in time range)   ketorolac (TORADOL) injection 15 mg (15 mg Intravenous Given 12/26/22 1324)       Assessments & Plan (with Medical Decision Making)     I have reviewed the nursing notes.    I have reviewed the findings, diagnosis, plan and need for follow up with the patient.      New Prescriptions    NIRMATRELVIR AND RITONAVIR (PAXLOVID, 150/100,) THERAPY PACK    Take 2 tablets by mouth 2 times daily for 5 days       Final diagnoses:   Infection due to 2019 novel coronavirus       12/26/2022   [facility]     Nathanael Song,   12/26/22 1444

## 2022-12-27 ENCOUNTER — TELEPHONE (OUTPATIENT)
Dept: FAMILY MEDICINE | Facility: OTHER | Age: 82
End: 2022-12-27

## 2022-12-27 RX ORDER — FUROSEMIDE 20 MG
TABLET ORAL
Qty: 90 TABLET | Refills: 0 | Status: SHIPPED | OUTPATIENT
Start: 2022-12-27 | End: 2023-01-30

## 2022-12-27 NOTE — TELEPHONE ENCOUNTER
Emergency Department and Urgent Care Follow-up    Pt spouse calling and pt gave OK to discuss.     Per ED needs Follow-up with PCP by 12.28.2022 and states within three days 12.29.2022.     Was ambulanced to ED.Fever,weak and shaking.    Covid positive.    Started Paxlovid last night. No new or worsening s/s today.    S/S of Covid started on 12.25.2022.    OVERBOOK on 12.28. or 12.29.2022?    Please advise.    Call back [phone]    Patricia Garcia RN            Reason for ER/UC visit: Covid positive s/s started Sunday 12.25.2022  o Date seen: 12.26.2022      New or Worsening symptoms:  no      Prescription Received/Picked up from Pharmacy?: Paxolvid   o Medications started? Started last night  o Any questions or issues regarding your prescription?: no      Follow-up Results or Labs that are pending:       Questions or concerns?:       ER Recommends Follow-up by: 2 days with PCP by 12.28.2022 and then by three days      RN Recommendations:   o Appointment scheduled:    If you start feeling worse, or have any further questions, please feel free to contact Nurse Triage at [phone].  If needing immediate medical attention at any time please call 911/Go to the ER.

## 2022-12-27 NOTE — TELEPHONE ENCOUNTER
furosemide (LASIX) 20 MG tablet      Last Written Prescription Date:  1-26-22  Last Fill Quantity: 90,   # refills: 3  Last Office Visit: 10-27-22  Future Office visit:    Next 5 appointments (look out 90 days)    Jan 30, 2023  9:45 AM  (Arrive by 9:30 AM)  SHORT with Evelyn Olson MD  [facility] [place] ([facility] [place] ) [address]  [place] [address]  [phone]           Routing refill request to provider for review/approval because:   Normal serum creatinine on file in past 12 months

## 2022-12-28 NOTE — TELEPHONE ENCOUNTER
If was just started on antiviral for covid - and stable - is there reason to see already?  Please reassess - otherwise - would continue with treatment and symptomatic cares.  If worsening, or new symptoms - then re-evaluate.

## 2022-12-28 NOTE — TELEPHONE ENCOUNTER
Pt called and updated on below.He states he is much improved.  He will call if he needs to .Advised ED if trouble breathing.  He has future appt with PCP.    Next 5 appointments (look out 90 days)    Jan 30, 2023  9:45 AM  (Arrive by 9:30 AM)  SHORT with Evelyn Olson MD  [facility] [place] ([facility] [place] ) [address]  [place] [address]  [phone]        Patricia Garcia RN

## 2023-01-20 DIAGNOSIS — I10 ESSENTIAL HYPERTENSION: ICD-10-CM

## 2023-01-20 RX ORDER — LOSARTAN POTASSIUM 50 MG/1
TABLET ORAL
Qty: 90 TABLET | Refills: 0 | Status: SHIPPED | OUTPATIENT
Start: 2023-01-20 | End: 2023-01-30

## 2023-01-26 NOTE — PROGRESS NOTES
Assessment & Plan   Problem List Items Addressed This Visit     Chronic, continuous use of opioids (Chronic)    Chronic pain syndrome - Primary    Degeneration of cervical intervertebral disc    Degeneration of lumbar or lumbosacral intervertebral disc    Essential hypertension    Relevant Medications    amLODIPine (NORVASC) 10 MG tablet    furosemide (LASIX) 20 MG tablet    losartan (COZAAR) 50 MG tablet   Other Visit Diagnoses     DDD (degenerative disc disease), lumbar        Relevant Medications    pregabalin (LYRICA) 150 MG capsule    Neuropathy        Relevant Medications    pregabalin (LYRICA) 150 MG capsule    Edema, lower extremity        Relevant Medications    furosemide (LASIX) 20 MG tablet    Stage 3a chronic kidney disease (H)               Doing well overall.  Pain control is adequate.  Tolerating medication.  Able to do activities of daily living, house chores.  Continue Lyrica.  Contract updated - see scanned media.  Follow up every 3 months.    Of note, recovered from COVID s/p Paxlovid treatment.  Did have bump in creatinine slightly.  Recheck next visit.         Patient Instructions   Follow up 3 months.      Return in about 3 months (around 4/30/2023) for chronic pain, diabetes.    Evelyn Iniguez MD  [facility] - FADIA Meehan is a 83 year old, presenting for the following health issues:  Chronic pain      HPI     Contract printed - signed  Urine drug screen done 10/2022.    Chronic Kidney Disease Follow-up  Do you take any over the counter pain medicine?: Yes  What over the counter medicine are you taking for your pain?:  Tylenol      How often do you take this medicine?:  Daily    Cr 1.31, 1.22, 1.24, 1.63    Cr 1.31 was when had COVID    Pain History:  When did you first notice your pain? - Chronic Pain   Have you seen this provider for your pain in the past?   Yes   Where in your body do you have pain? Low back and hips  Are you seeing anyone else for  your pain? No  Lyrica - continues to work well - desires to continue.  PDMP reviewed - last fill 11/21/22 for 90 day supply  150 mg BID  Shoveling.  Had COVID since last visit.  Weak, fatigue, confused.  Resolved.  Had typical respiratory symptoms, also resolved.  Treated with antiviral.  Paxlovid.  No falls or injuries.  No new symptoms related to pain.     PHQ-9 SCORE 7/29/2020 9/3/2021 10/27/2022   PHQ-9 Total Score 0 5 0       ROBYN-7 SCORE 9/3/2021 10/27/2022 1/30/2023   Total Score 1 0 0       PDMP Review       Value Time User    State PDMP site checked  Yes 1/30/2023  9:52 AM Evelyn Olson MD        Last CSA Agreement:   CSA -- Patient Level:     [Media Unavailable] Controlled Substance Agreement - Opioid - Scan on 10/29/2021  9:35 AM: OPIOD/OPIOD PLUS CONTROLLED SUBSTANCE AGREEMENT   [Media Unavailable] Controlled Substance Agreement - Opioid - Scan on 10/30/2020 10:59 AM: OPIOD/OPIOD PLUS CONTROLLED SUBSTANCE AGREEMENT   [Media Unavailable] Controlled Substance Agreement - Opioid - Scan on 7/26/2019 12:01 PM: OPIOD/OPIOD PLUS CONTROLLED SUBSTANCE AGREEMENT       Last UDS: 10/31/2022      Review of Systems   Constitutional, HEENT, cardiovascular, pulmonary, gi and gu systems are negative, except as otherwise noted.      Objective    /62 (BP Location: Right arm, Patient Position: Sitting, Cuff Size: Adult Regular)   Pulse 60   Temp 97.5  F (36.4  C) (Tympanic)   Ht 1.829 m (6')   Wt 107.7 kg (237 lb 8 oz)   SpO2 98%   BMI 32.21 kg/m    Body mass index is 32.21 kg/m .  Physical Exam   GENERAL: healthy, alert and no distress  NECK: no adenopathy, no asymmetry, masses, or scars and thyroid normal to palpation  RESP: lungs clear to auscultation - no rales, rhonchi or wheezes  CV: regular rate and rhythm, normal S1 S2, no S3 or S4, no murmur, click or rub, no peripheral edema and peripheral pulses strong  MS: normal muscle tone, trace edema to ankles and peripheral pulses normal; normal gait;  tender to palpation right para lumbar spine; negative SLR bilaterally; symmetric strength and sensation  SKIN: no suspicious lesions or rashes  NEURO: Normal strength and tone, mentation intact and speech normal  PSYCH: mentation appears normal, affect normal/bright

## 2023-01-30 ENCOUNTER — OFFICE VISIT (OUTPATIENT)
Dept: FAMILY MEDICINE | Facility: OTHER | Age: 83
End: 2023-01-30
Attending: FAMILY MEDICINE
Payer: COMMERCIAL

## 2023-01-30 VITALS
BODY MASS INDEX: 32.17 KG/M2 | DIASTOLIC BLOOD PRESSURE: 62 MMHG | HEART RATE: 60 BPM | OXYGEN SATURATION: 98 % | HEIGHT: 72 IN | SYSTOLIC BLOOD PRESSURE: 130 MMHG | WEIGHT: 237.5 LBS | TEMPERATURE: 97.5 F

## 2023-01-30 DIAGNOSIS — F11.90 CHRONIC, CONTINUOUS USE OF OPIOIDS: Chronic | ICD-10-CM

## 2023-01-30 DIAGNOSIS — I10 ESSENTIAL HYPERTENSION: ICD-10-CM

## 2023-01-30 DIAGNOSIS — M51.369 DDD (DEGENERATIVE DISC DISEASE), LUMBAR: ICD-10-CM

## 2023-01-30 DIAGNOSIS — N18.31 STAGE 3A CHRONIC KIDNEY DISEASE (H): ICD-10-CM

## 2023-01-30 DIAGNOSIS — G89.4 CHRONIC PAIN SYNDROME: Primary | ICD-10-CM

## 2023-01-30 DIAGNOSIS — M51.379 DEGENERATION OF LUMBAR OR LUMBOSACRAL INTERVERTEBRAL DISC: ICD-10-CM

## 2023-01-30 DIAGNOSIS — G62.9 NEUROPATHY: ICD-10-CM

## 2023-01-30 DIAGNOSIS — R60.0 EDEMA, LOWER EXTREMITY: ICD-10-CM

## 2023-01-30 DIAGNOSIS — M50.30 DEGENERATION OF CERVICAL INTERVERTEBRAL DISC: ICD-10-CM

## 2023-01-30 PROCEDURE — 99213 OFFICE O/P EST LOW 20 MIN: CPT | Performed by: FAMILY MEDICINE

## 2023-01-30 PROCEDURE — G0463 HOSPITAL OUTPT CLINIC VISIT: HCPCS

## 2023-01-30 RX ORDER — PREGABALIN 150 MG/1
150 CAPSULE ORAL 2 TIMES DAILY
Qty: 180 CAPSULE | Refills: 1 | Status: SHIPPED | OUTPATIENT
Start: 2023-01-30 | End: 2023-08-09

## 2023-01-30 RX ORDER — AMLODIPINE BESYLATE 10 MG/1
10 TABLET ORAL DAILY
Qty: 90 TABLET | Refills: 1 | Status: SHIPPED | OUTPATIENT
Start: 2023-01-30 | End: 2023-08-09

## 2023-01-30 RX ORDER — FUROSEMIDE 20 MG
20 TABLET ORAL DAILY
Qty: 90 TABLET | Refills: 1 | Status: SHIPPED | OUTPATIENT
Start: 2023-01-30 | End: 2023-08-09

## 2023-01-30 RX ORDER — ATORVASTATIN CALCIUM 80 MG/1
80 TABLET, FILM COATED ORAL DAILY
Qty: 90 TABLET | Refills: 1 | Status: SHIPPED | OUTPATIENT
Start: 2023-01-30 | End: 2023-08-09

## 2023-01-30 RX ORDER — LOSARTAN POTASSIUM 50 MG/1
50 TABLET ORAL DAILY
Qty: 90 TABLET | Refills: 1 | Status: SHIPPED | OUTPATIENT
Start: 2023-01-30 | End: 2023-08-09

## 2023-01-30 ASSESSMENT — ANXIETY QUESTIONNAIRES
4. TROUBLE RELAXING: NOT AT ALL
6. BECOMING EASILY ANNOYED OR IRRITABLE: NOT AT ALL
7. FEELING AFRAID AS IF SOMETHING AWFUL MIGHT HAPPEN: NOT AT ALL
GAD7 TOTAL SCORE: 0
3. WORRYING TOO MUCH ABOUT DIFFERENT THINGS: NOT AT ALL
GAD7 TOTAL SCORE: 0
IF YOU CHECKED OFF ANY PROBLEMS ON THIS QUESTIONNAIRE, HOW DIFFICULT HAVE THESE PROBLEMS MADE IT FOR YOU TO DO YOUR WORK, TAKE CARE OF THINGS AT HOME, OR GET ALONG WITH OTHER PEOPLE: NOT DIFFICULT AT ALL
1. FEELING NERVOUS, ANXIOUS, OR ON EDGE: NOT AT ALL
5. BEING SO RESTLESS THAT IT IS HARD TO SIT STILL: NOT AT ALL
2. NOT BEING ABLE TO STOP OR CONTROL WORRYING: NOT AT ALL

## 2023-04-10 ENCOUNTER — TELEPHONE (OUTPATIENT)
Dept: FAMILY MEDICINE | Facility: OTHER | Age: 83
End: 2023-04-10

## 2023-04-10 ENCOUNTER — OFFICE VISIT (OUTPATIENT)
Dept: FAMILY MEDICINE | Facility: OTHER | Age: 83
End: 2023-04-10
Attending: STUDENT IN AN ORGANIZED HEALTH CARE EDUCATION/TRAINING PROGRAM
Payer: COMMERCIAL

## 2023-04-10 VITALS
HEIGHT: 72 IN | HEART RATE: 77 BPM | WEIGHT: 230 LBS | SYSTOLIC BLOOD PRESSURE: 134 MMHG | DIASTOLIC BLOOD PRESSURE: 60 MMHG | BODY MASS INDEX: 31.15 KG/M2 | TEMPERATURE: 97 F | OXYGEN SATURATION: 98 %

## 2023-04-10 DIAGNOSIS — H93.8X3 EAR FULLNESS, BILATERAL: Primary | ICD-10-CM

## 2023-04-10 DIAGNOSIS — H61.22 IMPACTED CERUMEN OF LEFT EAR: ICD-10-CM

## 2023-04-10 DIAGNOSIS — H93.13 TINNITUS OF BOTH EARS: ICD-10-CM

## 2023-04-10 DIAGNOSIS — H69.91 DYSFUNCTION OF RIGHT EUSTACHIAN TUBE: ICD-10-CM

## 2023-04-10 DIAGNOSIS — H73.92 ABNORMAL TYMPANIC MEMBRANE OF LEFT EAR: ICD-10-CM

## 2023-04-10 PROCEDURE — G0463 HOSPITAL OUTPT CLINIC VISIT: HCPCS

## 2023-04-10 PROCEDURE — 69210 REMOVE IMPACTED EAR WAX UNI: CPT | Performed by: STUDENT IN AN ORGANIZED HEALTH CARE EDUCATION/TRAINING PROGRAM

## 2023-04-10 PROCEDURE — 99213 OFFICE O/P EST LOW 20 MIN: CPT | Mod: 25 | Performed by: STUDENT IN AN ORGANIZED HEALTH CARE EDUCATION/TRAINING PROGRAM

## 2023-04-10 RX ORDER — FLUTICASONE PROPIONATE 50 MCG
2 SPRAY, SUSPENSION (ML) NASAL DAILY
Qty: 15.8 ML | Refills: 0 | Status: SHIPPED | OUTPATIENT
Start: 2023-04-10

## 2023-04-10 ASSESSMENT — PAIN SCALES - GENERAL: PAINLEVEL: NO PAIN (0)

## 2023-04-10 NOTE — PATIENT INSTRUCTIONS
Audiology will call to schedule hearing test  ENT will call to schedule ear exam. Want to rule out choleastoma.   Start flonase daily again.   Can start daily oral allergy medicine - claritin or Zyrtec.

## 2023-04-10 NOTE — PROGRESS NOTES
Assessment & Plan     Ear fullness, bilateral  Wax impaction on left, suspect eustachian tube dysfunction on right.     Impacted cerumen of left ear  Removed - hearing returned to baseline after large impaction removed.   - REMOVE IMPACTED CERUMEN    Dysfunction of right eustachian tube  Air-fluid levels present. Recommend flonase daily. Follow up with ENT.   - fluticasone (FLONASE) 50 MCG/ACT nasal spray; Spray 2 sprays into both nostrils daily  - Adult ENT  Referral; Future    Tinnitus of both ears  Recommend audiology testing - he was agreeable.   - Adult Audiology  Referral; Future  - Adult ENT  Referral; Future    Abnormal tympanic membrane of left ear  Concern for calcification, possible choleastoma. Will refer to ENT for further evaluation. Appreciate assistance.   - Adult ENT  Referral; Future      Pao Bull MD  [facility] - FADIA Meehan is a 83 year old, presenting for the following health issues:  Plugged Ears          HPI     Concern - plugged ears   Onset: over a week  Description: both ears feel plugged, feels like his left ear is ringing more than his right   Intensity: mild  Progression of Symptoms:  same  Accompanying Signs & Symptoms: ears ringing   Previous history of similar problem: none  Precipitating factors:        Worsened by: none  Alleviating factors:        Improved by: none  Therapies tried and outcome: has tried to put drops in his ears and flushed them, without any success they still feel very plugged.     Both ears feel full but left is worse.   >1 week of symptoms.   Came on over two days.   Has never had this before.   Some trouble hearing out of both ears.   Can barely hear out of the left ear.   No fevers, hasn't been sick.   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 Bilateral.         Objective    /60 (BP Location: Left arm, Patient Position: Sitting, Cuff Size: Adult Regular)   Pulse 77   Temp 97  F (36.1  C) (Tympanic)   Ht 1.829 m (6')   Wt 104.3 kg (230 lb)   SpO2 98%   BMI 31.19 kg/m    Body mass index is 31.19 kg/m .  Physical Exam  Constitutional:       General: He is not in acute distress.     Appearance: Normal appearance. He is not ill-appearing.   HENT:      Head: Normocephalic and atraumatic.      Right Ear: Ear canal and external ear normal. A middle ear effusion is present. Tympanic membrane is retracted. Tympanic membrane is not erythematous or bulging.      Ears:        Comments: Areas of air fluid level on right TM.   Left TM prior to cleaning occluded with wax. After cleaning, canal clear but Area of appearing calcification on the left.      Nose: Nose normal.   Eyes:      Extraocular Movements: Extraocular movements intact.      Conjunctiva/sclera: Conjunctivae normal.      Pupils: Pupils are equal, round, and reactive to light.   Musculoskeletal:      Cervical back: Normal range of motion and neck supple.   Lymphadenopathy:      Cervical: No cervical adenopathy.   Neurological:      Mental Status: He is alert.

## 2023-04-10 NOTE — TELEPHONE ENCOUNTER
8:50 AM    Reason for Call: OVERBOOK    Patient is having the following symptoms: plugged ears for 1 weeks.    The patient is requesting an appointment for 4-10-23 or 4-11-23 with ANY PCP in [place].    Was an appointment offered for this call? No  If yes : Appointment type              Date    Preferred method for responding to this message: Telephone Call  What is your phone number ? [phone]    If we cannot reach you directly, may we leave a detailed response at the number you provided? Yes    Can this message wait until your PCP/provider returns, if unavailable today? No,     Amy Fair

## 2023-04-26 NOTE — PROGRESS NOTES
[facility] Service    Outpatient Physical Therapy Discharge Note  Patient: Zoran Granado  : 1940    Beginning/End Dates of Reporting Period:  10/4/22 to     Referring Provider: Dr. Evelyn Olson    Therapy Diagnosis: dizziness with position changes     Client Self Report: Pt states he has had some dizziness with lying down and getting up but not all the time.  Pt states he was working in the basement yesterday and looking up for an extended time and got dizzy, describes it as his balance being off.  Pt states it's also that his balance feels off when he first sits up as well.    Objective Measurements:                                          Outcome Measures (most recent score):      Goals:  Goal Identifier STG 1   Goal Description Repeat positional testing to be completed.   Target Date 10/18/22   Date Met  10/11/22   Progress (detail required for progress note): met     Goal Identifier LTG 1   Goal Description Pt to tolerate all bed mobility without episodes of dizziness.   Target Date 22   Date Met      Progress (detail required for progress note): met - pt called to cancel remaining appointments because vertigo was gone         Plan:  Discharge from therapy.    Discharge:    Reason for Discharge: Patient has met all goals.    Equipment Issued:     Discharge Plan: follow up prn if dizziness returns

## 2023-05-05 NOTE — PROGRESS NOTES
Assessment & Plan     Chronic pain syndrome  Degeneration of lumbar or lumbosacral intervertebral disc  Stable.  Continue Lyrica 150 mg BID.  Contract and UDS up to date.  Continues to be independent and able to do ADls, household chores, etc.    Elevated serum creatinine  Variable 1.07-1.63.    Fairly recent COVID infection.  Recheck today.  - Basic metabolic panel; Future    Essential hypertension  Stable.  Continue regimen.  - Basic metabolic panel; Future    Hyperlipidemia, unspecified hyperlipidemia type  Stable.  Continue statin.    Elevated fasting glucose  Stable.  Annual a1c.    Atherosclerosis of native coronary artery of native heart without angina pectoris  Risk factor management.       ENT consult cancelled.  Patient has no ongoing concerns.  Exam benign.    BMI:   Estimated body mass index is 32.29 kg/m  as calculated from the following:    Height as of 4/10/23: 1.829 m (6').    Weight as of this encounter: 108 kg (238 lb 1 oz).   Weight management plan: Discussed healthy diet and exercise guidelines    See Patient Instructions    Return in about 3 months (around 8/8/2023) for chronic pain.    Evelyn Iniguez MD  [facility] - FADIA Meehan is a 83 year old, presenting for the following health issues:  Chronic Pain, Diabetes, and Hyperlipidemia      HPI     Pain History:  When did you first notice your pain? Chronic   Have you seen this provider for your pain in the past?   Yes   Where in your body do you have pain? Shoulders, hips, low back  Are you seeing anyone else for your pain? No        7/29/2020     8:35 AM 9/3/2021    11:00 AM 10/27/2022     9:00 AM   PHQ-9 SCORE   PHQ-9 Total Score 0 5 0           10/27/2022     9:00 AM 1/30/2023     9:34 AM 5/8/2023     9:27 AM   ROBYN-7 SCORE   Total Score 0 0 0           7/27/2022     9:37 AM 1/30/2023     9:34 AM 5/8/2023     9:29 AM   PEG Score   PEG Total Score 2 2 1.67         PDMP Review       Value Time User    State  PDMP site checked  Yes 5/8/2023  8:05 AM Evelyn Olson MD        Last CSA Agreement:   CSA -- Patient Level:     [Media Unavailable] Controlled Substance Agreement - Opioid - Scan on 2/14/2023  1:22 PM: OPIOID/OPIOID PLUS CONTROLLED SUBSTANCE AGREEMENT   [Media Unavailable] Controlled Substance Agreement - Opioid - Scan on 10/29/2021  9:35 AM: OPIOD/OPIOD PLUS CONTROLLED SUBSTANCE AGREEMENT   [Media Unavailable] Controlled Substance Agreement - Opioid - Scan on 10/30/2020 10:59 AM: OPIOD/OPIOD PLUS CONTROLLED SUBSTANCE AGREEMENT   [Media Unavailable] Controlled Substance Agreement - Opioid - Scan on 7/26/2019 12:01 PM: OPIOD/OPIOD PLUS CONTROLLED SUBSTANCE AGREEMENT       Last UDS: 10/31/2022    0956}    PDMP reviewed    Lyrica 150 mg BDID- due for refill    Contract in place    UDS up to date    Prepping for change of season    Bump in Cr after COVID.  Due for recheck.    Prediabetes Follow-up    How often are you checking your blood sugar? Not at all    What concerns do you have today about your diabetes? None     Do you have any of these symptoms? (Select all that apply)  Numbness in feet and Burning in feet    Hyperlipidemia Follow-Up    Are you regularly taking any medication or supplement to lower your cholesterol?   Yes- lipitor    Are you having muscle aches or other side effects that you think could be caused by your cholesterol lowering medication?  No    Hypertension Follow-up    Do you check your blood pressure regularly outside of the clinic? Yes     Are you following a low salt diet? Yes    Are your blood pressures ever more than 140 on the top number (systolic) OR more   than 90 on the bottom number (diastolic), for example 140/90? No    BP Readings from Last 2 Encounters:   05/08/23 136/66   04/10/23 134/60     Hemoglobin A1C (%)   Date Value   10/27/2022 5.8 (H)   10/25/2021 6.0 (H)   10/29/2020 5.9 (H)   10/25/2019 6.0 (H)     LDL Cholesterol Calculated (mg/dL)   Date Value   10/27/2022 34    10/25/2021 55   10/29/2020 56   10/25/2019 54       Scheduled with ENT - Nae - would like to cancel.   No ongoing concerns.  Had cerumen removed last month at visit with Dr Bull.  Did Flonase for a while.      Review of Systems   Constitutional, HEENT, cardiovascular, pulmonary, gi and gu systems are negative, except as otherwise noted.      Objective    /66   Pulse 82   Resp 16   Wt 108 kg (238 lb 1 oz)   SpO2 97%   BMI 32.29 kg/m    Body mass index is 32.29 kg/m .  Physical Exam   GENERAL: healthy, alert and no distress  EYES: Eyes grossly normal to inspection, PERRL and conjunctivae and sclerae normal  HENT: normal cephalic/atraumatic, right ear: small air/fluid level, left ear: normal: no effusions, no erythema, normal landmarks, nose and mouth without ulcers or lesions, oropharynx clear and oral mucous membranes moist  NECK: no adenopathy, no asymmetry, masses, or scars and thyroid normal to palpation  RESP: lungs clear to auscultation - no rales, rhonchi or wheezes  CV: regular rate and rhythm, normal S1 S2, no S3 or S4, no murmur, click or rub, no peripheral edema and peripheral pulses strong  MS: normal muscle tone, no edema, peripheral pulses normal and low back with old surgical scar; lumbar spine and para spinal non-tender to palpation; negative SLR bilaterally; symmetric strength and sensation and reflexes  SKIN: no suspicious lesions or rashes  NEURO: Normal strength and tone, mentation intact and speech normal  PSYCH: mentation appears normal, affect normal/bright    BMP pending.

## 2023-05-08 ENCOUNTER — OFFICE VISIT (OUTPATIENT)
Dept: FAMILY MEDICINE | Facility: OTHER | Age: 83
End: 2023-05-08
Attending: FAMILY MEDICINE
Payer: COMMERCIAL

## 2023-05-08 VITALS
HEART RATE: 82 BPM | BODY MASS INDEX: 32.29 KG/M2 | SYSTOLIC BLOOD PRESSURE: 136 MMHG | DIASTOLIC BLOOD PRESSURE: 66 MMHG | RESPIRATION RATE: 16 BRPM | WEIGHT: 238.06 LBS | OXYGEN SATURATION: 97 %

## 2023-05-08 DIAGNOSIS — M51.379 DEGENERATION OF LUMBAR OR LUMBOSACRAL INTERVERTEBRAL DISC: ICD-10-CM

## 2023-05-08 DIAGNOSIS — R73.01 ELEVATED FASTING GLUCOSE: ICD-10-CM

## 2023-05-08 DIAGNOSIS — G89.4 CHRONIC PAIN SYNDROME: Primary | ICD-10-CM

## 2023-05-08 DIAGNOSIS — I10 ESSENTIAL HYPERTENSION: ICD-10-CM

## 2023-05-08 DIAGNOSIS — E78.5 HYPERLIPIDEMIA, UNSPECIFIED HYPERLIPIDEMIA TYPE: ICD-10-CM

## 2023-05-08 DIAGNOSIS — R79.89 ELEVATED SERUM CREATININE: ICD-10-CM

## 2023-05-08 DIAGNOSIS — I25.10 ATHEROSCLEROSIS OF NATIVE CORONARY ARTERY OF NATIVE HEART WITHOUT ANGINA PECTORIS: ICD-10-CM

## 2023-05-08 LAB
ANION GAP SERPL CALCULATED.3IONS-SCNC: 7 MMOL/L (ref 7–15)
BUN SERPL-MCNC: 20.5 MG/DL (ref 8–23)
CALCIUM SERPL-MCNC: 10.2 MG/DL (ref 8.8–10.2)
CHLORIDE SERPL-SCNC: 104 MMOL/L (ref 98–107)
CREAT SERPL-MCNC: 1.26 MG/DL (ref 0.67–1.17)
DEPRECATED HCO3 PLAS-SCNC: 29 MMOL/L (ref 22–29)
GFR SERPL CREATININE-BSD FRML MDRD: 57 ML/MIN/1.73M2
GLUCOSE SERPL-MCNC: 103 MG/DL (ref 70–99)
POTASSIUM SERPL-SCNC: 4.4 MMOL/L (ref 3.4–5.3)
SODIUM SERPL-SCNC: 140 MMOL/L (ref 136–145)

## 2023-05-08 PROCEDURE — 99213 OFFICE O/P EST LOW 20 MIN: CPT | Performed by: FAMILY MEDICINE

## 2023-05-08 PROCEDURE — 82310 ASSAY OF CALCIUM: CPT | Mod: ZL | Performed by: FAMILY MEDICINE

## 2023-05-08 PROCEDURE — 36415 COLL VENOUS BLD VENIPUNCTURE: CPT | Mod: ZL | Performed by: FAMILY MEDICINE

## 2023-05-08 PROCEDURE — G0463 HOSPITAL OUTPT CLINIC VISIT: HCPCS

## 2023-05-08 ASSESSMENT — ANXIETY QUESTIONNAIRES
1. FEELING NERVOUS, ANXIOUS, OR ON EDGE: NOT AT ALL
6. BECOMING EASILY ANNOYED OR IRRITABLE: NOT AT ALL
GAD7 TOTAL SCORE: 0
IF YOU CHECKED OFF ANY PROBLEMS ON THIS QUESTIONNAIRE, HOW DIFFICULT HAVE THESE PROBLEMS MADE IT FOR YOU TO DO YOUR WORK, TAKE CARE OF THINGS AT HOME, OR GET ALONG WITH OTHER PEOPLE: NOT DIFFICULT AT ALL
7. FEELING AFRAID AS IF SOMETHING AWFUL MIGHT HAPPEN: NOT AT ALL
GAD7 TOTAL SCORE: 0
2. NOT BEING ABLE TO STOP OR CONTROL WORRYING: NOT AT ALL
5. BEING SO RESTLESS THAT IT IS HARD TO SIT STILL: NOT AT ALL
3. WORRYING TOO MUCH ABOUT DIFFERENT THINGS: NOT AT ALL
4. TROUBLE RELAXING: NOT AT ALL

## 2023-05-08 ASSESSMENT — PAIN SCALES - GENERAL: PAINLEVEL: NO PAIN (0)

## 2023-05-31 ENCOUNTER — DOCUMENTATION ONLY (OUTPATIENT)
Dept: OTHER | Facility: CLINIC | Age: 83
End: 2023-05-31

## 2023-08-01 NOTE — PROGRESS NOTES
Assessment & Plan     Chronic pain syndrome  UDS and contract in place.  Overall stable.    Degeneration of lumbar or lumbosacral intervertebral disc  Lumbar radiculopathy  DDD (degenerative disc disease), lumbar  Requesting to go back up on Lyrica due to right leg symptoms.  Tolerated prior dose of 150 mg TID.  Script changed.  Last MRI 11/2020 -with right disc.  Injections not helpful at the time.  If no relief with dose change or if progressive, symptoms, plan to update MRI to reassess.  - pregabalin (LYRICA) 150 MG capsule; Take 1 capsule (150 mg) by mouth 3 times daily      Essential hypertension  Overall stable.  Continue current regimen.  Refiills done.  - losartan (COZAAR) 50 MG tablet; Take 1 tablet (50 mg) by mouth daily  - furosemide (LASIX) 20 MG tablet; Take 1 tablet (20 mg) by mouth daily  - amLODIPine (NORVASC) 10 MG tablet; Take 1 tablet (10 mg) by mouth daily  - CBC with platelets and differential; Future  - Comprehensive metabolic panel (BMP + Alb, Alk Phos, ALT, AST, Total. Bili, TP); Future    Hyperlipidemia, unspecified hyperlipidemia type  - atorvastatin (LIPITOR) 80 MG tablet; Take 1 tablet (80 mg) by mouth daily  - Comprehensive metabolic panel (BMP + Alb, Alk Phos, ALT, AST, Total. Bili, TP); Future  - Lipid Profile (Chol, Trig, HDL, LDL calc); Future    Stage 3a chronic kidney disease (H)  Stable.  Due for lab next visit.  - CBC with platelets and differential; Future  - Comprehensive metabolic panel (BMP + Alb, Alk Phos, ALT, AST, Total. Bili, TP); Future  - Albumin Random Urine Quantitative with Creat Ratio; Future    Elevated fasting glucose  - Comprehensive metabolic panel (BMP + Alb, Alk Phos, ALT, AST, Total. Bili, TP); Future  - Hemoglobin A1c; Future    Neuropathy  - pregabalin (LYRICA) 150 MG capsule; Take 1 capsule (150 mg) by mouth 3 times daily    Edema, lower extremity  Stable.  - furosemide (LASIX) 20 MG tablet; Take 1 tablet (20 mg) by mouth daily       See Patient  Instructions    Return in about 3 months (around 11/9/2023) for chronic disease management; fasting labs.    Evelyn Iniguez MD  [facility] - FADIA Meehan is a 83 year old, presenting for the following health issues:  Pain Management    HPI     Hyperlipidemia Follow-Up  Are you regularly taking any medication or supplement to lower your cholesterol?   Yes- Lipitor   Are you having muscle aches or other side effects that you think could be caused by your cholesterol lowering medication?  No    Hypertension Follow-up  Do you check your blood pressure regularly outside of the clinic? No   Are you following a low salt diet? No  Are your blood pressures ever more than 140 on the top number (systolic) OR more   than 90 on the bottom number (diastolic), for example 140/90? Unknown as patient has not been checking BP outside of clinic   Norvasc 10 mg, Cozaar 50 mg, Lasix 20 mg.    Chronic Kidney Disease Follow-up  Do you take any over the counter pain medicine?: Yes  What over the counter medicine are you taking for your pain?:  Tylenol PM and Acetaminophen     How often do you take this medicine?:  One time daily    Chronic/Recurring Back Pain Follow Up  Where is your back pain located? (Select all that apply) low back right  How would you describe your back pain?  sharp  Where does your back pain spread? the right buttock, the right  thigh, the right  knee, and the right foot  Since your last clinic visit for back pain, how has your pain changed? always present, but gets better and worse  Does your back pain interfere with your job? Not applicable  Since your last visit, have you tried any new treatment? No  On Lyrica 150 mg BID  PDMP reviewed.  Worsening leg pain  No weakness  No falls  Back is ok  Last MRI 11/2020 - L4/5 right; had GINA - does not believe it was helpful  Progressive, not new  No incontinence; urinary urgency; no dysuria/hematuria; nocturia - x 1 at most  Coffee  daily    Pain History:  When did you first notice your pain? Chronic    Have you seen this provider for your pain in the past? Yes   Where in your body do you have pain? Low back and down right leg to the ankle   Are you seeing anyone else for your pain? No        7/29/2020     8:35 AM 9/3/2021    11:00 AM 10/27/2022     9:00 AM   PHQ-9 SCORE   PHQ-9 Total Score 0 5 0           10/27/2022     9:00 AM 1/30/2023     9:34 AM 5/8/2023     9:27 AM   ROBYN-7 SCORE   Total Score 0 0 0       Chronic Pain Follow Up:  Location of pain: Low back down the right leg to the ankle   Analgesia/pain control:    - Recent changes:  Gradually getting worse   - Overall control: Inadequate pain control    - Current treatments: Tylenol and Acetaminophen    Adherence:     - Do you ever take more pain medicine than prescribed? No    - When did you take your last dose of pain medicine?  N/A   Adverse effects: No   PDMP Review         Value Time User    State PDMP site checked  Yes 5/8/2023  9:43 AM Evelyn Olson MD          Last CSA Agreement:   CSA -- Patient Level:     [Media Unavailable] Controlled Substance Agreement - Opioid - Scan on 2/14/2023  1:22 PM: OPIOID/OPIOID PLUS CONTROLLED SUBSTANCE AGREEMENT   [Media Unavailable] Controlled Substance Agreement - Opioid - Scan on 10/29/2021  9:35 AM: OPIOD/OPIOD PLUS CONTROLLED SUBSTANCE AGREEMENT   [Media Unavailable] Controlled Substance Agreement - Opioid - Scan on 10/30/2020 10:59 AM: OPIOD/OPIOD PLUS CONTROLLED SUBSTANCE AGREEMENT   [Media Unavailable] Controlled Substance Agreement - Opioid - Scan on 7/26/2019 12:01 PM: OPIOD/OPIOD PLUS CONTROLLED SUBSTANCE AGREEMENT       Last UDS: 10/31/2022        Review of Systems   Constitutional, HEENT, cardiovascular, pulmonary, gi and gu systems are negative, except as otherwise noted.      Objective    BP (!) 146/71 (BP Location: Left arm, Patient Position: Sitting, Cuff Size: Adult Large)   Pulse 75   Temp 97.6  F (36.4  C) (Tympanic)    Wt 107.7 kg (237 lb 6.4 oz)   SpO2 95%   BMI 32.20 kg/m    Body mass index is 32.2 kg/m .  Physical Exam   GENERAL: healthy, alert and no distress  NECK: no adenopathy, no asymmetry, masses, or scars and thyroid normal to palpation  RESP: lungs clear to auscultation - no rales, rhonchi or wheezes  CV: regular rate and rhythm, normal S1 S2, no S3 or S4, no murmur, click or rub, no peripheral edema and peripheral pulses strong  MS: normal muscle tone, trace bilateral ankle edema, peripheral pulses normal, tenderness to palpation mild right para lumbar spine, and SLR positive right, negative left; normal gait; symmetric strength and sensation; normal forward flexion - can almost touch toes - with some tingling right leg  SKIN: no suspicious lesions or rashes  NEURO: Normal strength and tone, mentation intact and speech normal  PSYCH: mentation appears normal, affect normal/bright

## 2023-08-09 ENCOUNTER — OFFICE VISIT (OUTPATIENT)
Dept: FAMILY MEDICINE | Facility: OTHER | Age: 83
End: 2023-08-09
Attending: FAMILY MEDICINE
Payer: COMMERCIAL

## 2023-08-09 VITALS
BODY MASS INDEX: 32.2 KG/M2 | SYSTOLIC BLOOD PRESSURE: 138 MMHG | OXYGEN SATURATION: 95 % | HEART RATE: 75 BPM | TEMPERATURE: 97.6 F | DIASTOLIC BLOOD PRESSURE: 68 MMHG | WEIGHT: 237.4 LBS

## 2023-08-09 DIAGNOSIS — M51.369 DDD (DEGENERATIVE DISC DISEASE), LUMBAR: ICD-10-CM

## 2023-08-09 DIAGNOSIS — M54.16 LUMBAR RADICULOPATHY: ICD-10-CM

## 2023-08-09 DIAGNOSIS — N18.31 STAGE 3A CHRONIC KIDNEY DISEASE (H): ICD-10-CM

## 2023-08-09 DIAGNOSIS — R60.0 EDEMA, LOWER EXTREMITY: ICD-10-CM

## 2023-08-09 DIAGNOSIS — I10 ESSENTIAL HYPERTENSION: ICD-10-CM

## 2023-08-09 DIAGNOSIS — R73.01 ELEVATED FASTING GLUCOSE: ICD-10-CM

## 2023-08-09 DIAGNOSIS — M51.379 DEGENERATION OF LUMBAR OR LUMBOSACRAL INTERVERTEBRAL DISC: ICD-10-CM

## 2023-08-09 DIAGNOSIS — E78.5 HYPERLIPIDEMIA, UNSPECIFIED HYPERLIPIDEMIA TYPE: ICD-10-CM

## 2023-08-09 DIAGNOSIS — G89.4 CHRONIC PAIN SYNDROME: Primary | ICD-10-CM

## 2023-08-09 DIAGNOSIS — G62.9 NEUROPATHY: ICD-10-CM

## 2023-08-09 PROCEDURE — 99214 OFFICE O/P EST MOD 30 MIN: CPT | Performed by: FAMILY MEDICINE

## 2023-08-09 PROCEDURE — G0463 HOSPITAL OUTPT CLINIC VISIT: HCPCS | Performed by: FAMILY MEDICINE

## 2023-08-09 RX ORDER — FUROSEMIDE 20 MG
20 TABLET ORAL DAILY
Qty: 90 TABLET | Refills: 1 | Status: SHIPPED | OUTPATIENT
Start: 2023-08-09 | End: 2024-03-20

## 2023-08-09 RX ORDER — AMLODIPINE BESYLATE 10 MG/1
10 TABLET ORAL DAILY
Qty: 90 TABLET | Refills: 1 | Status: SHIPPED | OUTPATIENT
Start: 2023-08-09 | End: 2024-03-12

## 2023-08-09 RX ORDER — PREGABALIN 150 MG/1
150 CAPSULE ORAL 3 TIMES DAILY
Qty: 270 CAPSULE | Refills: 1 | Status: SHIPPED | OUTPATIENT
Start: 2023-08-09 | End: 2023-11-09

## 2023-08-09 RX ORDER — LOSARTAN POTASSIUM 50 MG/1
50 TABLET ORAL DAILY
Qty: 90 TABLET | Refills: 1 | Status: SHIPPED | OUTPATIENT
Start: 2023-08-09 | End: 2024-04-18

## 2023-08-09 RX ORDER — ATORVASTATIN CALCIUM 80 MG/1
80 TABLET, FILM COATED ORAL DAILY
Qty: 90 TABLET | Refills: 1 | Status: SHIPPED | OUTPATIENT
Start: 2023-08-09 | End: 2024-02-28

## 2023-08-09 ASSESSMENT — PAIN SCALES - GENERAL: PAINLEVEL: MODERATE PAIN (4)

## 2023-08-09 NOTE — PATIENT INSTRUCTIONS
Follow up 3 months - come fasting for annual labs.  Increase Lyrica back to 150 mg 3 times per day.  Consider updating MRI if not improving - last done 2020.  Refills sent.

## 2023-10-16 ENCOUNTER — TELEPHONE (OUTPATIENT)
Dept: FAMILY MEDICINE | Facility: OTHER | Age: 83
End: 2023-10-16

## 2023-10-16 NOTE — TELEPHONE ENCOUNTER
I know you are out after Wednesday, do you want to see him this week or have him see covering provider?

## 2023-10-16 NOTE — TELEPHONE ENCOUNTER
"1:14 PM    Reason for Call: OVERBOOK    Patient is having the following symptoms: Both Ears very plugged for \"a while\".    The patient is requesting an appointment for ASAP with Wesley.    Was an appointment offered for this call? No    Preferred method for responding to this message: Telephone Call  What is your phone number [phone] (Jeannette, wife)  or [phone]    If we cannot reach you directly, may we leave a detailed response at the number you provided? Yes    Can this message wait until your PCP/provider returns, if unavailable today? Not applicable    Neli Bowens  "

## 2023-10-17 NOTE — TELEPHONE ENCOUNTER
Future Appointments 10/17/2023 - 4/14/2024        Date Visit Type Length Department Provider     10/18/2023  9:45 AM SHORT 30 min [facility] Evelyn Olson MD    Location Instructions:     From [facility] ([place], [place]): Take [address]. Turn left at [address]. Turn left at the first stoplight on [address]. At the first stop sign, take a right onto [address]. The upper level parking lot will be on the left. East Entrance Door number 10.   From [place]: Take [address]. Take a right at [address]. At the first stop sign, take a right onto [address]. The upper level parking lot will be on the left. East Entrance Door number 10.   From [place]: Take [address]. Take the [address] ramp towards [place]. Turn left onto [address]. Take a slight right onto [address]. Turn left at the first stoplight on [address]. At the first stop sign, take a right onto [address]. The upper level parking lot will be on the left. East Entrance Door number 10.              11/9/2023  8:45 AM SHORT 30 min  [facility] Evelyn Olson MD    Location Instructions:     From [facility] ([place], [place]): Take [address]. Turn left at [address]. Turn left at the first stoplight on [address]. At the first stop sign, take a right onto [address]. The upper level parking lot will be on the left. East Entrance Door number 10.   From [place]: Take [address]. Take a right at [address]. At the first stop sign, take a right onto [address]. The upper level parking lot will be on the left. East Entrance Door number 10.   From [place]: Take [address]. Take the [address] ramp towards [place]. Turn left onto [address]. Take a slight right onto [address]. Turn left at the first stoplight on [address]. At the first stop sign, take a right onto [address]. The upper level parking lot  will be on the left. East Entrance Door number 10.

## 2023-10-18 ENCOUNTER — OFFICE VISIT (OUTPATIENT)
Dept: FAMILY MEDICINE | Facility: OTHER | Age: 83
End: 2023-10-18
Attending: FAMILY MEDICINE
Payer: COMMERCIAL

## 2023-10-18 VITALS
HEART RATE: 70 BPM | SYSTOLIC BLOOD PRESSURE: 130 MMHG | DIASTOLIC BLOOD PRESSURE: 60 MMHG | HEIGHT: 72 IN | OXYGEN SATURATION: 97 % | TEMPERATURE: 97.6 F | BODY MASS INDEX: 32.51 KG/M2 | WEIGHT: 240 LBS

## 2023-10-18 DIAGNOSIS — H69.93 ETD (EUSTACHIAN TUBE DYSFUNCTION), BILATERAL: Primary | ICD-10-CM

## 2023-10-18 PROCEDURE — G0463 HOSPITAL OUTPT CLINIC VISIT: HCPCS | Mod: 25

## 2023-10-18 PROCEDURE — 99213 OFFICE O/P EST LOW 20 MIN: CPT | Performed by: FAMILY MEDICINE

## 2023-10-18 PROCEDURE — 91320 SARSCV2 VAC 30MCG TRS-SUC IM: CPT

## 2023-10-18 ASSESSMENT — PAIN SCALES - GENERAL: PAINLEVEL: NO PAIN (0)

## 2023-10-18 NOTE — PATIENT INSTRUCTIONS
No wax on exam.  No infection.  Just clear fluid.  Suspect allergies - seasonal - symptoms were present in spring, and now back in the fall.    Add nightly antihistamine - 10 mg Claritin/loratine or Zyrtec/cetirizine or Allegra/fexofenadine.  Generic is ok.  Take that consistently with your Flonase.  Humidifier and nasal saline to help with dryness and bloody noses.    ENT referral.

## 2023-11-08 NOTE — PROGRESS NOTES
Assessment & Plan     Chronic pain syndrome  Stable.  PDMP reviewed.  Lab screen updated.  Continue current regimen.  Allows him to keep active, independent with ADLs.  - Drug Confirmation Panel Urine with Creat; Future    DDD (degenerative disc disease), lumbar  Refilled Lyrica.  - pregabalin (LYRICA) 150 MG capsule; Take 1 capsule (150 mg) by mouth 3 times daily    Essential hypertension  At goal.    Continue Losartan 50 mg and Norvasc 10 mg.    Hyperlipidemia, unspecified hyperlipidemia type  LDL at goal <70.  Continue high intensity statin - Lipitor 80 mg.    Elevated fasting glucose  Stable A1c.  Continue to work on healthy diet ,exercise, weight management.    Stage 3a chronic kidney disease (H)  Stable.    Neuropathy  Stable.   - pregabalin (LYRICA) 150 MG capsule; Take 1 capsule (150 mg) by mouth 3 times daily    OME (otitis media with effusion), right  No pain today.  No fever.  No acute infection.  ENT and audiology scheduled 11/14/23.        BMI:   Estimated body mass index is 33.39 kg/m  as calculated from the following:    Height as of 10/18/23: 1.829 m (6').    Weight as of this encounter: 111.7 kg (246 lb 3.2 oz).   Weight management plan: Discussed healthy diet and exercise guidelines    See Patient Instructions    Return in about 3 months (around 2/9/2024) for chronic pain, diabetes.    Evelyn Iniguez MD  [facility] - FADIA Meehan is a 83 year old, presenting for the following health issues:  Lipids, Depression, Anxiety, and Pain      HPI     Ear ongoing - pulsating/gushing, or water dripping; just right sided.  Audiology/ENT appointment scheduled 11/14/23.  No OTC medications helpful.    Hyperlipidemia Follow-Up  Are you regularly taking any medication or supplement to lower your cholesterol?   Yes- Lipitor   Are you having muscle aches or other side effects that you think could be caused by your cholesterol lowering medication?  No    Depression and Anxiety  Follow-Up  How are you doing with your depression since your last visit? Improved   How are you doing with your anxiety since your last visit?  Improved   Are you having other symptoms that might be associated with depression or anxiety? No  Have you had a significant life event? No   Do you have any concerns with your use of alcohol or other drugs? No    Social History     Tobacco Use    Smoking status: Former     Types: Cigarettes     Quit date: 1992     Years since quittin.8     Passive exposure: Never    Smokeless tobacco: Never   Vaping Use    Vaping Use: Never used   Substance Use Topics    Alcohol use: Never    Drug use: No         9/3/2021    11:00 AM 10/27/2022     9:00 AM 2023     8:49 AM   PHQ   PHQ-9 Total Score 5 0 4   Q9: Thoughts of better off dead/self-harm past 2 weeks Not at all Not at all Not at all         2023     9:34 AM 2023     9:27 AM 2023     8:51 AM   ROBYN-7 SCORE   Total Score   0 (minimal anxiety)   Total Score 0 0 0         2023     8:49 AM   Last PHQ-9   1.  Little interest or pleasure in doing things 0   2.  Feeling down, depressed, or hopeless 0   3.  Trouble falling or staying asleep, or sleeping too much 1   4.  Feeling tired or having little energy 1   5.  Poor appetite or overeating 1   6.  Feeling bad about yourself 0   7.  Trouble concentrating 1   8.  Moving slowly or restless 0   Q9: Thoughts of better off dead/self-harm past 2 weeks 0   PHQ-9 Total Score 4         2023     8:51 AM   ROBYN-7    1. Feeling nervous, anxious, or on edge 0   2. Not being able to stop or control worrying 0   3. Worrying too much about different things 0   4. Trouble relaxing 0   5. Being so restless that it is hard to sit still 0   6. Becoming easily annoyed or irritable 0   7. Feeling afraid, as if something awful might happen 0   ROBYN-7 Total Score 0   If you checked any problems, how difficult have they made it for you to do your work, take care of things at  home, or get along with other people? Not difficult at all       Suicide Assessment Five-step Evaluation and Treatment (SAFE-T)      Pain History:  When did you first notice your pain? Chronic    Have you seen this provider for your pain in the past? Yes   Where in your body do you have pain? Shoulder, hips and low back   Are you seeing anyone else for your pain? No  No falls or injuries.  No medication side effects.  Dose is adequate.          1/30/2023     9:34 AM 5/8/2023     9:27 AM 11/9/2023     8:51 AM   ROBYN-7 SCORE   Total Score   0 (minimal anxiety)   Total Score 0 0 0           1/30/2023     9:34 AM 5/8/2023     9:29 AM 11/9/2023     8:46 AM   PEG Score   PEG Total Score 2 1.67 2.33       Chronic Pain Follow Up:    Location of pain: Shoulder, hip and low back   Analgesia/pain control:    - Recent changes:  No changes     - Overall control: Tolerable with discomfort    - Current treatments: Tylenol PRN   Adherence:     - Do you ever take more pain medicine than prescribed? No    - When did you take your last dose of pain medicine?  N/A  Adverse effects: No   PDMP Review         Value Time User    State PDMP site checked  Yes 8/9/2023  8:33 AM Evelyn Olson MD          Last CSA Agreement:   CSA -- Patient Level:     [Media Unavailable] Controlled Substance Agreement - Opioid - Scan on 2/14/2023  1:22 PM: OPIOID/OPIOID PLUS CONTROLLED SUBSTANCE AGREEMENT   [Media Unavailable] Controlled Substance Agreement - Opioid - Scan on 10/29/2021  9:35 AM: OPIOD/OPIOD PLUS CONTROLLED SUBSTANCE AGREEMENT   [Media Unavailable] Controlled Substance Agreement - Opioid - Scan on 10/30/2020 10:59 AM: OPIOD/OPIOD PLUS CONTROLLED SUBSTANCE AGREEMENT   [Media Unavailable] Controlled Substance Agreement - Opioid - Scan on 7/26/2019 12:01 PM: OPIOD/OPIOD PLUS CONTROLLED SUBSTANCE AGREEMENT       Last UDS: 10/31/2022    How many servings of fruits and vegetables do you eat daily?  0-1  On average, how many sweetened beverages  do you drink each day (Examples: soda, juice, sweet tea, etc.  Do NOT count diet or artificially sweetened beverages)?   1  How many days per week do you exercise enough to make your heart beat faster? 3 or less  How many minutes a day do you exercise enough to make your heart beat faster? 9 or less  How many days per week do you miss taking your medication? 0      Review of Systems   Constitutional, HEENT, cardiovascular, pulmonary, gi and gu systems are negative, except as otherwise noted.      Objective    /80 (BP Location: Left arm, Patient Position: Sitting, Cuff Size: Adult Large)   Pulse 66   Temp 97.7  F (36.5  C) (Tympanic)   Wt 111.7 kg (246 lb 3.2 oz)   SpO2 97%   BMI 33.39 kg/m    Body mass index is 33.39 kg/m .  Physical Exam   GENERAL: healthy, alert, no distress, and over weight  EYES: Eyes grossly normal to inspection, PERRL and conjunctivae and sclerae normal  HENT: normal cephalic/atraumatic, right ear: clear effusion, left ear: normal: no effusions, no erythema, normal landmarks, nose and mouth without ulcers or lesions, oropharynx clear, and oral mucous membranes moist  NECK: no adenopathy, no asymmetry, masses, or scars and thyroid normal to palpation  RESP: lungs clear to auscultation - no rales, rhonchi or wheezes  CV: regular rate and rhythm, normal S1 S2, no S3 or S4, no murmur, click or rub, no peripheral edema and peripheral pulses strong  ABDOMEN: soft, nontender, no hepatosplenomegaly, no masses and bowel sounds normal  MS: normal muscle tone, trace edema to bilateral ankles, and peripheral pulses normal  PSYCH: mentation appears normal, affect normal/bright    Results for orders placed or performed in visit on 11/09/23 (from the past 24 hour(s))   CBC with platelets and differential    Narrative    The following orders were created for panel order CBC with platelets and differential.  Procedure                               Abnormality         Status                      ---------                               -----------         ------                     CBC with platelets and d...[[id number]]                      Final result                 Please view results for these tests on the individual orders.   Comprehensive metabolic panel (BMP + Alb, Alk Phos, ALT, AST, Total. Bili, TP)   Result Value Ref Range    Sodium 140 135 - 145 mmol/L    Potassium 4.9 3.4 - 5.3 mmol/L    Carbon Dioxide (CO2) 26 22 - 29 mmol/L    Anion Gap 8 7 - 15 mmol/L    Urea Nitrogen 22.7 8.0 - 23.0 mg/dL    Creatinine 1.21 (H) 0.67 - 1.17 mg/dL    GFR Estimate 60 (L) >60 mL/min/1.73m2    Calcium 9.5 8.8 - 10.2 mg/dL    Chloride 106 98 - 107 mmol/L    Glucose 106 (H) 70 - 99 mg/dL    Alkaline Phosphatase 80 40 - 129 U/L    AST 23 0 - 45 U/L    ALT 15 0 - 70 U/L    Protein Total 7.1 6.4 - 8.3 g/dL    Albumin 4.1 3.5 - 5.2 g/dL    Bilirubin Total 0.3 <=1.2 mg/dL   Lipid Profile (Chol, Trig, HDL, LDL calc)   Result Value Ref Range    Cholesterol 113 <200 mg/dL    Triglycerides 160 (H) <150 mg/dL    Direct Measure HDL 29 (L) >=40 mg/dL    LDL Cholesterol Calculated 52 <=100 mg/dL    Non HDL Cholesterol 84 <130 mg/dL    Narrative    Cholesterol  Desirable:  <200 mg/dL    Triglycerides  Normal:  Less than 150 mg/dL  Borderline High:  150-199 mg/dL  High:  200-499 mg/dL  Very High:  Greater than or equal to 500 mg/dL    Direct Measure HDL  Female:  Greater than or equal to 50 mg/dL   Male:  Greater than or equal to 40 mg/dL    LDL Cholesterol  Desirable:  <100mg/dL  Above Desirable:  100-129 mg/dL   Borderline High:  130-159 mg/dL   High:  160-189 mg/dL   Very High:  >= 190 mg/dL    Non HDL Cholesterol  Desirable:  130 mg/dL  Above Desirable:  130-159 mg/dL  Borderline High:  160-189 mg/dL  High:  190-219 mg/dL  Very High:  Greater than or equal to 220 mg/dL   Hemoglobin A1c   Result Value Ref Range    Estimated Average Glucose 120 mg/dL    Hemoglobin A1C 5.8 (H) <5.7 %   CBC with platelets and differential    Result Value Ref Range    WBC Count 6.4 4.0 - 11.0 10e3/uL    RBC Count 4.63 4.40 - 5.90 10e6/uL    Hemoglobin 13.9 13.3 - 17.7 g/dL    Hematocrit 42.0 40.0 - 53.0 %    MCV 91 78 - 100 fL    MCH 30.0 26.5 - 33.0 pg    MCHC 33.1 31.5 - 36.5 g/dL    RDW 13.2 10.0 - 15.0 %    Platelet Count 245 150 - 450 10e3/uL    % Neutrophils 51 %    % Lymphocytes 31 %    % Monocytes 11 %    % Eosinophils 6 %    % Basophils 1 %    % Immature Granulocytes 0 %    NRBCs per 100 WBC 0 <1 /100    Absolute Neutrophils 3.1 1.6 - 8.3 10e3/uL    Absolute Lymphocytes 2.0 0.8 - 5.3 10e3/uL    Absolute Monocytes 0.7 0.0 - 1.3 10e3/uL    Absolute Eosinophils 0.4 0.0 - 0.7 10e3/uL    Absolute Basophils 0.1 0.0 - 0.2 10e3/uL    Absolute Immature Granulocytes 0.0 <=0.4 10e3/uL    Absolute NRBCs 0.0 10e3/uL   Extra Tube    Narrative    The following orders were created for panel order Extra Tube.  Procedure                               Abnormality         Status                     ---------                               -----------         ------                     Extra Urine Collection[[id number]]                           Final result                 Please view results for these tests on the individual orders.   Extra Urine Collection   Result Value Ref Range    Hold Specimen in fridge    Drug Confirmation Panel Urine with Creat    Narrative    The following orders were created for panel order Drug Confirmation Panel Urine with Creat.  Procedure                               Abnormality         Status                     ---------                               -----------         ------                     Urine Drug Confirmation ...[[id number]]                      In process                 Urine Creatinine for Willy...[[id number]]                      In process                   Please view results for these tests on the individual orders.                   Answers submitted by the patient for this visit:  Patient Health Questionnaire  (Submitted on 11/9/2023)  If you checked off any problems, how difficult have these problems made it for you to do your work, take care of things at home, or get along with other people?: Not difficult at all  PHQ9 TOTAL SCORE: 4  ROBYN-7 (Submitted on 11/9/2023)  ROBYN 7 TOTAL SCORE: 0

## 2023-11-09 ENCOUNTER — OFFICE VISIT (OUTPATIENT)
Dept: FAMILY MEDICINE | Facility: OTHER | Age: 83
End: 2023-11-09
Attending: FAMILY MEDICINE
Payer: COMMERCIAL

## 2023-11-09 ENCOUNTER — LAB (OUTPATIENT)
Dept: LAB | Facility: OTHER | Age: 83
End: 2023-11-09
Attending: FAMILY MEDICINE
Payer: COMMERCIAL

## 2023-11-09 VITALS
OXYGEN SATURATION: 97 % | BODY MASS INDEX: 33.39 KG/M2 | HEART RATE: 66 BPM | WEIGHT: 246.2 LBS | SYSTOLIC BLOOD PRESSURE: 114 MMHG | DIASTOLIC BLOOD PRESSURE: 80 MMHG | TEMPERATURE: 97.7 F

## 2023-11-09 DIAGNOSIS — M51.369 DDD (DEGENERATIVE DISC DISEASE), LUMBAR: ICD-10-CM

## 2023-11-09 DIAGNOSIS — R73.01 ELEVATED FASTING GLUCOSE: ICD-10-CM

## 2023-11-09 DIAGNOSIS — G62.9 NEUROPATHY: ICD-10-CM

## 2023-11-09 DIAGNOSIS — E78.5 HYPERLIPIDEMIA, UNSPECIFIED HYPERLIPIDEMIA TYPE: ICD-10-CM

## 2023-11-09 DIAGNOSIS — N18.31 STAGE 3A CHRONIC KIDNEY DISEASE (H): ICD-10-CM

## 2023-11-09 DIAGNOSIS — G89.4 CHRONIC PAIN SYNDROME: ICD-10-CM

## 2023-11-09 DIAGNOSIS — I10 ESSENTIAL HYPERTENSION: ICD-10-CM

## 2023-11-09 DIAGNOSIS — H65.91 OME (OTITIS MEDIA WITH EFFUSION), RIGHT: ICD-10-CM

## 2023-11-09 DIAGNOSIS — G89.4 CHRONIC PAIN SYNDROME: Primary | ICD-10-CM

## 2023-11-09 LAB
ALBUMIN SERPL BCG-MCNC: 4.1 G/DL (ref 3.5–5.2)
ALP SERPL-CCNC: 80 U/L (ref 40–129)
ALT SERPL W P-5'-P-CCNC: 15 U/L (ref 0–70)
ANION GAP SERPL CALCULATED.3IONS-SCNC: 8 MMOL/L (ref 7–15)
AST SERPL W P-5'-P-CCNC: 23 U/L (ref 0–45)
BASOPHILS # BLD AUTO: 0.1 10E3/UL (ref 0–0.2)
BASOPHILS NFR BLD AUTO: 1 %
BILIRUB SERPL-MCNC: 0.3 MG/DL
BUN SERPL-MCNC: 22.7 MG/DL (ref 8–23)
CALCIUM SERPL-MCNC: 9.5 MG/DL (ref 8.8–10.2)
CHLORIDE SERPL-SCNC: 106 MMOL/L (ref 98–107)
CHOLEST SERPL-MCNC: 113 MG/DL
CREAT SERPL-MCNC: 1.21 MG/DL (ref 0.67–1.17)
CREAT UR-MCNC: 158.7 MG/DL
CREAT UR-MCNC: 159 MG/DL
DEPRECATED HCO3 PLAS-SCNC: 26 MMOL/L (ref 22–29)
EGFRCR SERPLBLD CKD-EPI 2021: 60 ML/MIN/1.73M2
EOSINOPHIL # BLD AUTO: 0.4 10E3/UL (ref 0–0.7)
EOSINOPHIL NFR BLD AUTO: 6 %
ERYTHROCYTE [DISTWIDTH] IN BLOOD BY AUTOMATED COUNT: 13.2 % (ref 10–15)
EST. AVERAGE GLUCOSE BLD GHB EST-MCNC: 120 MG/DL
GLUCOSE SERPL-MCNC: 106 MG/DL (ref 70–99)
HBA1C MFR BLD: 5.8 %
HCT VFR BLD AUTO: 42 % (ref 40–53)
HDLC SERPL-MCNC: 29 MG/DL
HGB BLD-MCNC: 13.9 G/DL (ref 13.3–17.7)
HOLD SPECIMEN: NORMAL
IMM GRANULOCYTES # BLD: 0 10E3/UL
IMM GRANULOCYTES NFR BLD: 0 %
LDLC SERPL CALC-MCNC: 52 MG/DL
LYMPHOCYTES # BLD AUTO: 2 10E3/UL (ref 0.8–5.3)
LYMPHOCYTES NFR BLD AUTO: 31 %
MCH RBC QN AUTO: 30 PG (ref 26.5–33)
MCHC RBC AUTO-ENTMCNC: 33.1 G/DL (ref 31.5–36.5)
MCV RBC AUTO: 91 FL (ref 78–100)
MICROALBUMIN UR-MCNC: <12 MG/L
MICROALBUMIN/CREAT UR: NORMAL MG/G{CREAT}
MONOCYTES # BLD AUTO: 0.7 10E3/UL (ref 0–1.3)
MONOCYTES NFR BLD AUTO: 11 %
NEUTROPHILS # BLD AUTO: 3.1 10E3/UL (ref 1.6–8.3)
NEUTROPHILS NFR BLD AUTO: 51 %
NONHDLC SERPL-MCNC: 84 MG/DL
NRBC # BLD AUTO: 0 10E3/UL
NRBC BLD AUTO-RTO: 0 /100
PLATELET # BLD AUTO: 245 10E3/UL (ref 150–450)
POTASSIUM SERPL-SCNC: 4.9 MMOL/L (ref 3.4–5.3)
PROT SERPL-MCNC: 7.1 G/DL (ref 6.4–8.3)
RBC # BLD AUTO: 4.63 10E6/UL (ref 4.4–5.9)
SODIUM SERPL-SCNC: 140 MMOL/L (ref 135–145)
TRIGL SERPL-MCNC: 160 MG/DL
WBC # BLD AUTO: 6.4 10E3/UL (ref 4–11)

## 2023-11-09 PROCEDURE — G0463 HOSPITAL OUTPT CLINIC VISIT: HCPCS

## 2023-11-09 PROCEDURE — 36415 COLL VENOUS BLD VENIPUNCTURE: CPT | Mod: ZL

## 2023-11-09 PROCEDURE — 80355 GABAPENTIN NON-BLOOD: CPT | Mod: ZL

## 2023-11-09 PROCEDURE — 80348 DRUG SCREENING BUPRENORPHINE: CPT | Mod: ZL

## 2023-11-09 PROCEDURE — 80053 COMPREHEN METABOLIC PANEL: CPT | Mod: ZL

## 2023-11-09 PROCEDURE — 85004 AUTOMATED DIFF WBC COUNT: CPT | Mod: ZL

## 2023-11-09 PROCEDURE — 80061 LIPID PANEL: CPT | Mod: ZL

## 2023-11-09 PROCEDURE — 82570 ASSAY OF URINE CREATININE: CPT | Mod: ZL

## 2023-11-09 PROCEDURE — 80353 DRUG SCREENING COCAINE: CPT | Mod: ZL

## 2023-11-09 PROCEDURE — 83036 HEMOGLOBIN GLYCOSYLATED A1C: CPT | Mod: ZL

## 2023-11-09 PROCEDURE — 99214 OFFICE O/P EST MOD 30 MIN: CPT | Performed by: FAMILY MEDICINE

## 2023-11-09 RX ORDER — PREGABALIN 150 MG/1
150 CAPSULE ORAL 3 TIMES DAILY
Qty: 270 CAPSULE | Refills: 1 | Status: SHIPPED | OUTPATIENT
Start: 2023-11-09 | End: 2024-02-09

## 2023-11-09 ASSESSMENT — ANXIETY QUESTIONNAIRES
4. TROUBLE RELAXING: NOT AT ALL
IF YOU CHECKED OFF ANY PROBLEMS ON THIS QUESTIONNAIRE, HOW DIFFICULT HAVE THESE PROBLEMS MADE IT FOR YOU TO DO YOUR WORK, TAKE CARE OF THINGS AT HOME, OR GET ALONG WITH OTHER PEOPLE: NOT DIFFICULT AT ALL
6. BECOMING EASILY ANNOYED OR IRRITABLE: NOT AT ALL
3. WORRYING TOO MUCH ABOUT DIFFERENT THINGS: NOT AT ALL
2. NOT BEING ABLE TO STOP OR CONTROL WORRYING: NOT AT ALL
5. BEING SO RESTLESS THAT IT IS HARD TO SIT STILL: NOT AT ALL
1. FEELING NERVOUS, ANXIOUS, OR ON EDGE: NOT AT ALL
GAD7 TOTAL SCORE: 0
GAD7 TOTAL SCORE: 0
7. FEELING AFRAID AS IF SOMETHING AWFUL MIGHT HAPPEN: NOT AT ALL

## 2023-11-09 ASSESSMENT — PATIENT HEALTH QUESTIONNAIRE - PHQ9
10. IF YOU CHECKED OFF ANY PROBLEMS, HOW DIFFICULT HAVE THESE PROBLEMS MADE IT FOR YOU TO DO YOUR WORK, TAKE CARE OF THINGS AT HOME, OR GET ALONG WITH OTHER PEOPLE: NOT DIFFICULT AT ALL
SUM OF ALL RESPONSES TO PHQ QUESTIONS 1-9: 4
SUM OF ALL RESPONSES TO PHQ QUESTIONS 1-9: 4

## 2023-11-09 ASSESSMENT — PAIN SCALES - GENERAL: PAINLEVEL: MODERATE PAIN (5)

## 2023-11-13 LAB — PREGABALIN UR QL CFM: PRESENT

## 2023-11-14 ENCOUNTER — OFFICE VISIT (OUTPATIENT)
Dept: AUDIOLOGY | Facility: OTHER | Age: 83
End: 2023-11-14
Attending: AUDIOLOGIST
Payer: COMMERCIAL

## 2023-11-14 ENCOUNTER — OFFICE VISIT (OUTPATIENT)
Dept: OTOLARYNGOLOGY | Facility: OTHER | Age: 83
End: 2023-11-14
Attending: AUDIOLOGIST
Payer: COMMERCIAL

## 2023-11-14 VITALS
OXYGEN SATURATION: 91 % | TEMPERATURE: 98 F | HEIGHT: 72 IN | BODY MASS INDEX: 32.51 KG/M2 | HEART RATE: 78 BPM | SYSTOLIC BLOOD PRESSURE: 130 MMHG | WEIGHT: 240 LBS | DIASTOLIC BLOOD PRESSURE: 70 MMHG

## 2023-11-14 DIAGNOSIS — H90.A31 MIXED CONDUCTIVE AND SENSORINEURAL HEARING LOSS OF RIGHT EAR WITH RESTRICTED HEARING OF LEFT EAR: Primary | ICD-10-CM

## 2023-11-14 DIAGNOSIS — H65.91 OME (OTITIS MEDIA WITH EFFUSION), RIGHT: Primary | ICD-10-CM

## 2023-11-14 DIAGNOSIS — H90.A22 SENSORINEURAL HEARING LOSS (SNHL) OF LEFT EAR WITH RESTRICTED HEARING OF RIGHT EAR: ICD-10-CM

## 2023-11-14 DIAGNOSIS — H69.93 ETD (EUSTACHIAN TUBE DYSFUNCTION), BILATERAL: ICD-10-CM

## 2023-11-14 DIAGNOSIS — H69.91 DYSFUNCTION OF RIGHT EUSTACHIAN TUBE: ICD-10-CM

## 2023-11-14 DIAGNOSIS — Z96.22 S/P MYRINGOTOMY WITH INSERTION OF TUBE: ICD-10-CM

## 2023-11-14 PROCEDURE — 92557 COMPREHENSIVE HEARING TEST: CPT | Performed by: AUDIOLOGIST

## 2023-11-14 PROCEDURE — G0463 HOSPITAL OUTPT CLINIC VISIT: HCPCS | Mod: 25

## 2023-11-14 PROCEDURE — 31231 NASAL ENDOSCOPY DX: CPT | Performed by: OTOLARYNGOLOGY

## 2023-11-14 PROCEDURE — 99203 OFFICE O/P NEW LOW 30 MIN: CPT | Mod: 25 | Performed by: OTOLARYNGOLOGY

## 2023-11-14 PROCEDURE — 92567 TYMPANOMETRY: CPT | Performed by: AUDIOLOGIST

## 2023-11-14 PROCEDURE — 92511 NASOPHARYNGOSCOPY: CPT | Performed by: OTOLARYNGOLOGY

## 2023-11-14 PROCEDURE — 69433 CREATE EARDRUM OPENING: CPT | Performed by: OTOLARYNGOLOGY

## 2023-11-14 PROCEDURE — 69420 INCISION OF EARDRUM: CPT | Mod: RT | Performed by: OTOLARYNGOLOGY

## 2023-11-14 RX ORDER — OFLOXACIN 3 MG/ML
5 SOLUTION AURICULAR (OTIC) 2 TIMES DAILY
Qty: 5 ML | Refills: 1 | Status: SHIPPED | OUTPATIENT
Start: 2023-11-14 | End: 2023-11-21

## 2023-11-14 RX ORDER — CIPROFLOXACIN AND DEXAMETHASONE 3; 1 MG/ML; MG/ML
4 SUSPENSION/ DROPS AURICULAR (OTIC) ONCE
Status: COMPLETED | OUTPATIENT
Start: 2023-11-14 | End: 2023-11-14

## 2023-11-14 RX ADMIN — CIPROFLOXACIN AND DEXAMETHASONE 4 DROP: 3; 1 SUSPENSION/ DROPS AURICULAR (OTIC) at 15:25

## 2023-11-14 ASSESSMENT — PAIN SCALES - GENERAL: PAINLEVEL: MILD PAIN (2)

## 2023-11-14 NOTE — PROGRESS NOTES
Audiology Evaluation Completed. Please refer SCANNED AUDIOGRAM and/or TYMPANOGRAM for BACKGROUND, RESULTS, RECOMMENDATIONS.      Mary OVALLE, [facility]  Audiologist #[id number]

## 2023-11-14 NOTE — PATIENT INSTRUCTIONS
Thank you for allowing Dr. Fine and our ENT team to participate in your care.  If your medications are too expensive, please give the nurse a call.  We can possibly change this medication.  If you have a scheduling or an appointment question please contact our Health Unit Coordinator at their direct line [phone].   ALL nursing questions or concerns can be directed to your ENT nurse, Bryan, at: [phone]    Follow-up in one month for a repeat hearing test.   Follow-up with ENT in 1-2 months.    Instructions for Myringotomy Tubes ( Ear Tubes)      Take one dose of liquid or chewable TYLENOL based on weight the morning of surgery.   If you can swallow pills you may take tylenol tablets with a small sip of water.  If you have any dosing questions ask your pharmacist.         Recovery - The placement of ear tubes is a brief operation, and therefore the recovery from the anesthetic is usually less than a day.  However, in young children the sleep patterns, feeding, and behavior can be altered for several days.  Try to return to the daily routine as soon as possible and this issue will resolve without problems.  There are no restrictions to diet or activity after ear tube placement.    Medications - Children and adults can return to all preoperative medications after this procedure, including blood thinners.  You were sent home with ear drops, please use them as directed to assist in the rapid healing of the ear drum around the tube.  Pain medication may have been sent home with you, but a vast majority of the time, over the counter Tylenol or ibuprofen (advil) I sufficient. Finish prescription ear drops (4 drops twice a day).     Complications - A low grade fever (up to 100 degrees ) is not unusual in the day after tubes are placed.  Treat this with cool wash cloths to the forehead and Tylenol.  If the fever is higher, or does not respond to medication, call the Doctor s office or call service after hours.   A small amount of bloody drainage can occur for a day or two after ear tubes, and is perfectly normal, continue the ear drops as directed and it will clear up.    Water Precautions - Recent clinical research has shown that absolute water precautions are not always necessary.  Ear plugs or water head bands are not necessary unless the ear is actively draining, or if your child does not like the sensation of water in the ear.    Follow up - Approximately 1 month after the tubes are placed I like to examine the ears to make sure there are no signs of complications, which are extremely rare.  You should already have an appointment in 1 month with ENT PA and audiology.  If not, call our office at [phone].  In some unusual cases the ears  reject  the tubes.  Depending on the situation, a hearing test may or may not be performed at that time.  Afterwards, follow up is done every 6 months, but of course earlier if there are any issues or problems.    Advantages of Tubes - After ear tube placement, there are certain benefits from having a direct communication of the middle ear space with the ear canal.  In the event of drainage from the ears with ear tubes in place ( which is common with colds and flus ) use the ear drops you were discharged home with using the same dosage and instructions.  This will clear up the ears without the need for oral antibiotics a majority of the time.  Another advantage is that with tubes in place, the ears automatically adjust to changes in atmospheric pressure ( such as in airplanes or elevation ).  In other words, if the tubes are open the ears will not hurt or pop!

## 2023-11-14 NOTE — LETTER
11/14/2023         RE: Zoran Granado  [address]  [address]  [facility] [address]        Dear Colleague,    Thank you for referring your patient, Zoran Granado, to the [facility]. Please see a copy of my visit note below.    Otolaryngology Note         Chief Complaint:     Patient presents with:  Ear Problem: HEA/ETD bilateral. Referred by, Dr. Evelyn Olson           History of Present Illness:     Zoran Granado is a 83 year old male seen today for concerns for a plugged sensation in the right ear with associated decreased hearing. Symptoms have been present for 2 months.  There was a preceding URI.  He has been using sudafed, ah, nasal spray and rinses without resolution.    He has a history of fluid in his ears that has cleared on it's own in the past.    No history of recurrent OM.   No history of ear surgery.   He has right sided pain and pressure in the ear that ebbs and flows.    + history of noise exposure in the past.  Didn't always wear HP.    + family history of hearing loss, brothers and child.    Former smoker - quit in 1992.  Smoked for about 40 years, 1.5 packs per day.  60 year pack history   Patient has no history of otological surgeries or procedures  no family hx of congential hearing loss, COM  no current concerns with otalgia, otorrhea.    Significant occupational history of noise exposure  + history of BPPV improved with Epley Maneuvers, no recent vertigo  No facial numbness or tingling.      Audiogram completed today:  Right type B tymp, small volume, left type A  SRT right 40 dB, left 30 dB  Thresholds are moderate to mild mixed loss right and sensorineural left sloping to severe range left and profound right.  Speech reception thresholds are in good agreement with pure tone average.  Word discrimination scores are excellent at supra-thresholds level         Medications:     Current Outpatient Rx   Medication Sig Dispense Refill     Acetaminophen 325 MG  CAPS Take 325 mg by mouth 3 times daily       amLODIPine (NORVASC) 10 MG tablet Take 1 tablet (10 mg) by mouth daily 90 tablet 1     aspirin 81 MG tablet Take by mouth daily       atorvastatin (LIPITOR) 80 MG tablet Take 1 tablet (80 mg) by mouth daily 90 tablet 1     calcium carbonate-vitamin D 600-200 MG-UNIT CAPS Take by mouth 2 times daily        diphenhydrAMINE-acetaminophen (TYLENOL PM)  MG tablet Take 1 tablet by mouth nightly as needed for sleep       fluticasone (FLONASE) 50 MCG/ACT nasal spray Spray 2 sprays into both nostrils daily 15.8 mL 0     furosemide (LASIX) 20 MG tablet Take 1 tablet (20 mg) by mouth daily 90 tablet 1     losartan (COZAAR) 50 MG tablet Take 1 tablet (50 mg) by mouth daily 90 tablet 1     Multiple Vitamins-Minerals (PRESERVISION AREDS 2 PO) Take 1 capsule by mouth 2 times daily       pregabalin (LYRICA) 150 MG capsule Take 1 capsule (150 mg) by mouth 3 times daily 270 capsule 1            Allergies:     Allergies: Seasonal allergies          Past Medical History:     Past Medical History:   Diagnosis Date     Cataract     both eyes     Chronic back pain      DDD (degenerative disc disease), cervical      DDD (degenerative disc disease), lumbar      GERD (gastroesophageal reflux disease)      Hyperlipidemia      Lumbar radiculopathy     Dr. Brown; [facility]; plan revision decompression and fusion L4/5     Macular degeneration     receives injections; Dr. Taveras in [place]     Prediabetes      Trochanteric bursitis of both hips     [facility]/p bilateral [facility] - [facility]            Past Surgical History:     Past Surgical History:   Procedure Laterality Date     APPENDECTOMY       ARTHROSCOPY KNEE BILATERAL       BACK SURGERY  2000    lumbar L5/S1     CATARACT IOL, RT/LT Bilateral      COLONOSCOPY       COLONOSCOPY - HIM SCAN  09/09/2004    Colonoscopy & Polypectomy - a small polyp.  Path - benign colonic mucosa     disc replacement  2001    lumbar; single level L5/S1;   "[facility] ECP WITH CATARACT SURGERY  2012    both eyes     NISSEN FUNDOPLICATION       SHOULDER SURGERY      Bilateral; twice left, once right; arthroscopies     SPINE SURGERY  2021    decompression hemilaminotomy descectomy L4/5; posterior spine fusion L4/5, trasnforaminal interbody fusion L4/5, hardware removal L5/S1; Dr Brown [facility]     TONSILLECTOMY         ENT family history reviewed         Social History:     Social History     Tobacco Use     Smoking status: Former     Types: Cigarettes     Quit date: 1992     Years since quittin.8     Passive exposure: Never     Smokeless tobacco: Never   Vaping Use     Vaping Use: Never used   Substance Use Topics     Alcohol use: Never     Drug use: No            Review of Systems:     ROS: See HPI         Physical Exam:     /70 (BP Location: Right arm, Patient Position: Sitting, Cuff Size: Adult Large)   Pulse 78   Temp 98  F (36.7  C) (Temporal)   Ht 1.829 m (6' 0.01\")   Wt 108.9 kg (240 lb)   SpO2 91%   BMI 32.54 kg/m    General - The patient is well nourished and well developed, and appears to have good nutritional status.  Alert and oriented to person and place, answers questions and cooperates with examination appropriately.   Head and Face - Normocephalic and atraumatic, with no gross asymmetry noted.  The facial nerve is intact, with strong symmetric movements.  Voice and Breathing - The patient was breathing comfortably without the use of accessory muscles. There was no wheezing, stridor. The patients voice was clear and strong, and had appropriate pitch and quality.  Ears - examined under microscopy bilaterally  External ear normal. Canals are patent. Right tympanic membrane is intact with serous effusion, no retraction or mass. Left tympanic membrane is intact without effusion, retraction or mass.  Eyes - Extraocular movements intact, sclera were not icteric or injected, conjunctiva were pink and " moist.  Mouth - Examination of the oral cavity showed pink, healthy oral mucosa. Dentition in good condition, upper dentures removed for exam.   No lesions or ulcerations noted. The tongue was mobile and midline.   Throat - The walls of the oropharynx were smooth, pink, moist, symmetric, and had no lesions or ulcerations.  The tonsillar pillars and soft palate were symmetric. The uvula was midline on elevation.  Tonsils are surgically absent.   Neck - Normal midline excursion of the laryngotracheal complex during swallowing.  Full range of motion on passive movement.  Palpation of the occipital, submental, submandibular, internal jugular chain, and supraclavicular nodes did not demonstrate any abnormal lymph nodes or masses.  Palpation of the thyroid was soft and smooth, with no nodules or goiter appreciated.  The trachea was mobile and midline.  Nose - External contour is symmetric, no gross deflection or scars.  Nasal mucosa is pink and moist with no abnormal mucus.  The septum and turbinates were evaluated with nasal speculum, no polyps, masses, or purulence noted on examination.    To evaluate the nose and sinuses, I performed rigid nasal endoscopy.  I sprayed both nares with 2 sprays lidocaine and neosynephrine.     I began with the RIGHT side using a 0 degree rigid nasal endoscope, and then similarly examined the LEFT side     Findings:  Inferior turbinates:  moderately enlarged  Middle turbinate and middle meatus: unremarkable  The superior meatus is examined and unremarkable  The sphenoethmoidal recess is examined bilaterally and unremarkable  Mucosa is healthy throughout,  no polyps nor polypoid degeneration  Nasopharynx clear, ET patent, no edema  The patient tolerated the procedure well     CT head reviewed on 9/14/22, no middle ear mass or canal erosion, no bone erosions, ossicular chain intact    PROCEDURE  RIGHT Myringotomy    After consent was obtained and risks including hearing loss tympanic  membrane perforation cholesteatoma and need for further surgery or tube insertion were discussed the patient was laid in a comfortable and supine position.  I then use topical phenol and anesthetized the anterior inferior quadrants. I used a myringotomy blade to make a right incision.  I used a 3 Mckinnon to ensure the middle ear was clear.  Otic drops were placed and a cotton ball was placed.  The patient tolerated the procedure well.           Assessment and Plan:       ICD-10-CM    1. OME (otitis media with effusion), right  H65.91 phenol 89 % swab 1 Swab     ciprofloxacin-dexAMETHasone (CIPRODEX) 0.3-0.1 % otic suspension 4 drop      2. ETD (Eustachian tube dysfunction), bilateral  H69.93 Adult ENT  Referral        Complete floxin  Post myringotomy instructions provided  Follow-up in one month for a repeat hearing test.   Follow-up with ENT in 1-2 months with repeat ear check may require tube if recurrent          Again, thank you for allowing me to participate in the care of your patient.        Sincerely,        Mila Fine MD

## 2023-11-14 NOTE — PROGRESS NOTES
Otolaryngology Note         Chief Complaint:     Patient presents with:  Ear Problem: HEA/ETD bilateral. Referred by, Dr. Evelyn Olson           History of Present Illness:     Zoran Granado is a 83 year old male seen today for concerns for a plugged sensation in the right ear with associated decreased hearing. Symptoms have been present for 2 months.  There was a preceding URI.  He has been using sudafed, ah, nasal spray and rinses without resolution.    He has a history of fluid in his ears that has cleared on it's own in the past.    No history of recurrent OM.   No history of ear surgery.   He has right sided pain and pressure in the ear that ebbs and flows.    + history of noise exposure in the past.  Didn't always wear HP.    + family history of hearing loss, brothers and child.    Former smoker - quit in 1992.  Smoked for about 40 years, 1.5 packs per day.  60 year pack history   Patient has no history of otological surgeries or procedures  no family hx of congential hearing loss, COM  no current concerns with otalgia, otorrhea.    Significant occupational history of noise exposure  + history of BPPV improved with Epley Maneuvers, no recent vertigo  No facial numbness or tingling.      Audiogram completed today:  Right type B tymp, small volume, left type A  SRT right 40 dB, left 30 dB  Thresholds are moderate to mild mixed loss right and sensorineural left sloping to severe range left and profound right.  Speech reception thresholds are in good agreement with pure tone average.  Word discrimination scores are excellent at supra-thresholds level         Medications:     Current Outpatient Rx   Medication Sig Dispense Refill    Acetaminophen 325 MG CAPS Take 325 mg by mouth 3 times daily      amLODIPine (NORVASC) 10 MG tablet Take 1 tablet (10 mg) by mouth daily 90 tablet 1    aspirin 81 MG tablet Take by mouth daily      atorvastatin (LIPITOR) 80 MG tablet Take 1 tablet (80 mg) by mouth daily 90 tablet  1    calcium carbonate-vitamin D 600-200 MG-UNIT CAPS Take by mouth 2 times daily       diphenhydrAMINE-acetaminophen (TYLENOL PM)  MG tablet Take 1 tablet by mouth nightly as needed for sleep      fluticasone (FLONASE) 50 MCG/ACT nasal spray Spray 2 sprays into both nostrils daily 15.8 mL 0    furosemide (LASIX) 20 MG tablet Take 1 tablet (20 mg) by mouth daily 90 tablet 1    losartan (COZAAR) 50 MG tablet Take 1 tablet (50 mg) by mouth daily 90 tablet 1    Multiple Vitamins-Minerals (PRESERVISION AREDS 2 PO) Take 1 capsule by mouth 2 times daily      pregabalin (LYRICA) 150 MG capsule Take 1 capsule (150 mg) by mouth 3 times daily 270 capsule 1            Allergies:     Allergies: Seasonal allergies          Past Medical History:     Past Medical History:   Diagnosis Date    Cataract     both eyes    Chronic back pain     DDD (degenerative disc disease), cervical     DDD (degenerative disc disease), lumbar     GERD (gastroesophageal reflux disease)     Hyperlipidemia     Lumbar radiculopathy     Dr. Brown;  [facility]; plan revision decompression and fusion L4/5    Macular degeneration     receives injections; Dr. Taveras in [place]    Prediabetes     Trochanteric bursitis of both hips     s/p bilateral [facility] - [facility]            Past Surgical History:     Past Surgical History:   Procedure Laterality Date    APPENDECTOMY      ARTHROSCOPY KNEE BILATERAL      BACK SURGERY  2000    lumbar L5/S1    CATARACT IOL, RT/LT Bilateral     COLONOSCOPY      COLONOSCOPY - HIM SCAN  09/09/2004    Colonoscopy & Polypectomy - a small polyp.  Path - benign colonic mucosa    disc replacement  2001    lumbar; single level L5/S1; Dr Cage - [facility] ECP WITH CATARACT SURGERY  2012    both eyes    NISSEN FUNDOPLICATION      SHOULDER SURGERY      Bilateral; twice left, once right; arthroscopies    SPINE SURGERY  02/03/2021    decompression hemilaminotomy descectomy L4/5; posterior spine fusion L4/5,  "trasnforaminal interbody fusion L4/5, hardware removal L5/S1; Dr Brown [facility]    TONSILLECTOMY         ENT family history reviewed         Social History:     Social History     Tobacco Use    Smoking status: Former     Types: Cigarettes     Quit date: 1992     Years since quittin.8     Passive exposure: Never    Smokeless tobacco: Never   Vaping Use    Vaping Use: Never used   Substance Use Topics    Alcohol use: Never    Drug use: No            Review of Systems:     ROS: See HPI         Physical Exam:     /70 (BP Location: Right arm, Patient Position: Sitting, Cuff Size: Adult Large)   Pulse 78   Temp 98  F (36.7  C) (Temporal)   Ht 1.829 m (6' 0.01\")   Wt 108.9 kg (240 lb)   SpO2 91%   BMI 32.54 kg/m    General - The patient is well nourished and well developed, and appears to have good nutritional status.  Alert and oriented to person and place, answers questions and cooperates with examination appropriately.   Head and Face - Normocephalic and atraumatic, with no gross asymmetry noted.  The facial nerve is intact, with strong symmetric movements.  Voice and Breathing - The patient was breathing comfortably without the use of accessory muscles. There was no wheezing, stridor. The patients voice was clear and strong, and had appropriate pitch and quality.  Ears - examined under microscopy bilaterally  External ear normal. Canals are patent. Right tympanic membrane is intact with serous effusion, no retraction or mass. Left tympanic membrane is intact without effusion, retraction or mass.  Eyes - Extraocular movements intact, sclera were not icteric or injected, conjunctiva were pink and moist.  Mouth - Examination of the oral cavity showed pink, healthy oral mucosa. Dentition in good condition, upper dentures removed for exam.   No lesions or ulcerations noted. The tongue was mobile and midline.   Throat - The walls of the oropharynx were smooth, pink, moist, symmetric, and had no " lesions or ulcerations.  The tonsillar pillars and soft palate were symmetric. The uvula was midline on elevation.  Tonsils are surgically absent.   Neck - Normal midline excursion of the laryngotracheal complex during swallowing.  Full range of motion on passive movement.  Palpation of the occipital, submental, submandibular, internal jugular chain, and supraclavicular nodes did not demonstrate any abnormal lymph nodes or masses.  Palpation of the thyroid was soft and smooth, with no nodules or goiter appreciated.  The trachea was mobile and midline.  Nose - External contour is symmetric, no gross deflection or scars.  Nasal mucosa is pink and moist with no abnormal mucus.  The septum and turbinates were evaluated with nasal speculum, no polyps, masses, or purulence noted on examination.    To evaluate the nose and sinuses, I performed rigid nasal endoscopy.  I sprayed both nares with 2 sprays lidocaine and neosynephrine.     I began with the RIGHT side using a 0 degree rigid nasal endoscope, and then similarly examined the LEFT side     Findings:  Inferior turbinates:  moderately enlarged  Middle turbinate and middle meatus: unremarkable  The superior meatus is examined and unremarkable  The sphenoethmoidal recess is examined bilaterally and unremarkable  Mucosa is healthy throughout,  no polyps nor polypoid degeneration  Nasopharynx clear, ET patent, no edema  The patient tolerated the procedure well     CT head reviewed on 9/14/22, no middle ear mass or canal erosion, no bone erosions, ossicular chain intact    PROCEDURE  RIGHT Myringotomy    After consent was obtained and risks including hearing loss tympanic membrane perforation cholesteatoma and need for further surgery or tube insertion were discussed the patient was laid in a comfortable and supine position.  I then use topical phenol and anesthetized the anterior inferior quadrants. I used a myringotomy blade to make a right incision.  I used a 3 Mckinnon  to ensure the middle ear was clear.  Otic drops were placed and a cotton ball was placed.  The patient tolerated the procedure well.           Assessment and Plan:       ICD-10-CM    1. OME (otitis media with effusion), right  H65.91 phenol 89 % swab 1 Swab     ciprofloxacin-dexAMETHasone (CIPRODEX) 0.3-0.1 % otic suspension 4 drop      2. ETD (Eustachian tube dysfunction), bilateral  H69.93 Adult ENT  Referral        Complete floxin  Post myringotomy instructions provided  Follow-up in one month for a repeat hearing test.   Follow-up with ENT in 1-2 months with repeat ear check may require tube if recurrent

## 2023-12-06 DIAGNOSIS — H91.93 DECREASED HEARING OF BOTH EARS: Primary | ICD-10-CM

## 2023-12-12 ENCOUNTER — OFFICE VISIT (OUTPATIENT)
Dept: AUDIOLOGY | Facility: OTHER | Age: 83
End: 2023-12-12
Attending: AUDIOLOGIST
Payer: COMMERCIAL

## 2023-12-12 DIAGNOSIS — H91.93 DECREASED HEARING OF BOTH EARS: ICD-10-CM

## 2023-12-12 DIAGNOSIS — H69.91 DYSFUNCTION OF RIGHT EUSTACHIAN TUBE: ICD-10-CM

## 2023-12-12 DIAGNOSIS — H90.A31 MIXED CONDUCTIVE AND SENSORINEURAL HEARING LOSS OF RIGHT EAR WITH RESTRICTED HEARING OF LEFT EAR: Primary | ICD-10-CM

## 2023-12-12 PROCEDURE — 92567 TYMPANOMETRY: CPT | Mod: RT | Performed by: AUDIOLOGIST

## 2023-12-12 PROCEDURE — 92557 COMPREHENSIVE HEARING TEST: CPT | Mod: 52,RT | Performed by: AUDIOLOGIST

## 2023-12-12 NOTE — PROGRESS NOTES
Audiology Evaluation Completed. Please refer SCANNED AUDIOGRAM and/or TYMPANOGRAM for BACKGROUND, RESULTS, RECOMMENDATIONS.      Mary OVALLE, [facility]  Audiologist #[id number]

## 2023-12-13 ENCOUNTER — OFFICE VISIT (OUTPATIENT)
Dept: OTOLARYNGOLOGY | Facility: OTHER | Age: 83
End: 2023-12-13
Attending: NURSE PRACTITIONER
Payer: COMMERCIAL

## 2023-12-13 ENCOUNTER — TELEPHONE (OUTPATIENT)
Dept: OTOLARYNGOLOGY | Facility: OTHER | Age: 83
End: 2023-12-13

## 2023-12-13 VITALS
BODY MASS INDEX: 32.43 KG/M2 | SYSTOLIC BLOOD PRESSURE: 135 MMHG | DIASTOLIC BLOOD PRESSURE: 66 MMHG | OXYGEN SATURATION: 98 % | HEIGHT: 72 IN | WEIGHT: 239.42 LBS | TEMPERATURE: 95.8 F | HEART RATE: 62 BPM

## 2023-12-13 DIAGNOSIS — H65.91 OME (OTITIS MEDIA WITH EFFUSION), RIGHT: ICD-10-CM

## 2023-12-13 DIAGNOSIS — H69.93 ETD (EUSTACHIAN TUBE DYSFUNCTION), BILATERAL: Primary | ICD-10-CM

## 2023-12-13 DIAGNOSIS — H90.2 CONDUCTIVE HEARING LOSS, UNILATERAL: ICD-10-CM

## 2023-12-13 PROCEDURE — 92504 EAR MICROSCOPY EXAMINATION: CPT | Performed by: NURSE PRACTITIONER

## 2023-12-13 PROCEDURE — 99213 OFFICE O/P EST LOW 20 MIN: CPT | Mod: 25 | Performed by: NURSE PRACTITIONER

## 2023-12-13 PROCEDURE — G0463 HOSPITAL OUTPT CLINIC VISIT: HCPCS

## 2023-12-13 ASSESSMENT — PAIN SCALES - GENERAL: PAINLEVEL: NO PAIN (0)

## 2023-12-13 NOTE — PROGRESS NOTES
Otolaryngology Note         Chief Complaint:     Patient presents with:  Follow Up: 1 month follow up/ Otitis Media with effusion; right            History of Present Illness:     Zoran Granado is a 83 year old male seen today for follow-up otitis media with effusion status post right myringotomy completed by Dr. Fine on 11/14/2023.    He states that he had improvement in hearing and plugged sensation for approximately a week, the right ear eventually became more plugged and hearing continued to decrease.  No concerns for significant pain, bothersome tinnitus, otorrhea, vertigo, flux hearing.      Nasopharyngeal exam completed 11/14/2023 showed clear nasopharynx with bilateral eustachian tubes patent    Follow-up audiogram completed yesterday:  Right tympanogram Type B right ear suggesting restricted eardrum mobility.  Right thresholds are decreased-mixed loss mild to profound.  Speech reception thresholds are in good agreement with pure tone average.  Word discrimination scores are excellent at supra-thresholds level.         Medications:     Current Outpatient Rx   Medication Sig Dispense Refill    Acetaminophen 325 MG CAPS Take 325 mg by mouth 3 times daily      amLODIPine (NORVASC) 10 MG tablet Take 1 tablet (10 mg) by mouth daily 90 tablet 1    aspirin 81 MG tablet Take by mouth daily      atorvastatin (LIPITOR) 80 MG tablet Take 1 tablet (80 mg) by mouth daily 90 tablet 1    calcium carbonate-vitamin D 600-200 MG-UNIT CAPS Take by mouth 2 times daily       diphenhydrAMINE-acetaminophen (TYLENOL PM)  MG tablet Take 1 tablet by mouth nightly as needed for sleep      fluticasone (FLONASE) 50 MCG/ACT nasal spray Spray 2 sprays into both nostrils daily 15.8 mL 0    furosemide (LASIX) 20 MG tablet Take 1 tablet (20 mg) by mouth daily 90 tablet 1    losartan (COZAAR) 50 MG tablet Take 1 tablet (50 mg) by mouth daily 90 tablet 1    Multiple Vitamins-Minerals (PRESERVISION AREDS 2 PO) Take 1 capsule by  mouth 2 times daily      pregabalin (LYRICA) 150 MG capsule Take 1 capsule (150 mg) by mouth 3 times daily 270 capsule 1    ofloxacin (FLOXIN) 0.3 % otic solution Place 5 drops into the right ear daily for 7 days 5 mL 0            Allergies:     Allergies: Seasonal allergies          Past Medical History:     Past Medical History:   Diagnosis Date    Cataract     both eyes    Chronic back pain     DDD (degenerative disc disease), cervical     DDD (degenerative disc disease), lumbar     GERD (gastroesophageal reflux disease)     Hyperlipidemia     Lumbar radiculopathy     Dr. Brown;  Spine; plan revision decompression and fusion L4/5    Macular degeneration     receives injections; Dr. Taveras in [place]    Prediabetes     Trochanteric bursitis of both hips     s/p bilateral injections - [facility]            Past Surgical History:     Past Surgical History:   Procedure Laterality Date    APPENDECTOMY      ARTHROSCOPY KNEE BILATERAL      BACK SURGERY      lumbar L5/S1    CATARACT IOL, RT/LT Bilateral     COLONOSCOPY      COLONOSCOPY - HIM SCAN  2004    Colonoscopy & Polypectomy - a small polyp.  Path - benign colonic mucosa    disc replacement      lumbar; single level L5/S1; Dr Cage - [facility] ECP WITH CATARACT SURGERY      both eyes    NISSEN FUNDOPLICATION      SHOULDER SURGERY      Bilateral; twice left, once right; arthroscopies    SPINE SURGERY  2021    decompression hemilaminotomy descectomy L4/5; posterior spine fusion L4/5, trasnforaminal interbody fusion L4/5, hardware removal L5/S1; [facility]    TONSILLECTOMY         ENT family history reviewed         Social History:     Social History     Tobacco Use    Smoking status: Former     Types: Cigarettes     Quit date: 1992     Years since quittin.9     Passive exposure: Never    Smokeless tobacco: Never   Vaping Use    Vaping Use: Never used   Substance Use Topics    Alcohol use: Never     "Drug use: No            Review of Systems:     ROS: See HPI         Physical Exam:     /66 (BP Location: Right arm, Patient Position: Sitting, Cuff Size: Adult Large)   Pulse 62   Temp (!) 95.8  F (35.4  C) (Temporal)   Ht 1.829 m (6' 0.01\")   Wt 108.6 kg (239 lb 6.7 oz)   SpO2 98%   BMI 32.46 kg/m      General - The patient is well nourished and well developed, and appears to have good nutritional status.  Alert and oriented to person and place, answers questions and cooperates with examination appropriately.   Head and Face - Normocephalic and atraumatic, with no gross asymmetry noted.  The facial nerve is intact, with strong symmetric movements.  Voice and Breathing - The patient was breathing comfortably without the use of accessory muscles. There was no wheezing, stridor. The patients voice was clear and strong, and had appropriate pitch and quality.  Ears - examined under microscopy bilaterally.  The right canal is patent, right TM is intact with serous effusion.  The left canal is patent, left TM intact without effusion or retraction.  Eyes - Extraocular movements intact, sclera were not icteric or injected, conjunctiva were pink and moist.  Neck - Normal ROM, no palpable lymphadenopathy  Nose - External contour is symmetric, no gross deflection or scars.  Nasal mucosa is pink and moist with no abnormal mucus.      NP exam negative on 11/14/23         Assessment and Plan:       ICD-10-CM    1. ETD (Eustachian tube dysfunction), bilateral  H69.93       2. OME (otitis media with effusion), right  H65.91       3. Conductive hearing loss, unilateral  H90.2         Follow up for tube placement    Joselin Mauro NP-C  [facility] ENT    "

## 2023-12-13 NOTE — TELEPHONE ENCOUNTER
Message left for patient to let him know he has an appointment 12/14/23, with Dr. Fine, check in @ 1:00 p.m. Call back number left for ENT dept. for patient to receive more information about appointment.

## 2023-12-13 NOTE — TELEPHONE ENCOUNTER
Zoran, returned call from [employer] received.  He is aware he has an appointment with Dr. Fine, 12/14/23, check in @ 1:00 p.m. for tube insertion, patient verbalized understanding.

## 2023-12-13 NOTE — LETTER
12/13/2023         RE: Zoran Granado  [address]  [address]  [facility] [address]        Dear Colleague,    Thank you for referring your patient, Zoran Granado, to the [facility]. Please see a copy of my visit note below.    Otolaryngology Note         Chief Complaint:     Patient presents with:  Follow Up: 1 month follow up/ Otitis Media with effusion; right            History of Present Illness:     Zoran Granado is a 83 year old male seen today for follow-up otitis media with effusion status post right myringotomy completed by Dr. Fine on 11/14/2023.    He states that he had improvement in hearing and plugged sensation for approximately a week, the right ear eventually became more plugged and hearing continued to decrease.  No concerns for significant pain, bothersome tinnitus, otorrhea, vertigo, flux hearing.      Nasopharyngeal exam completed 11/14/2023 showed clear nasopharynx with bilateral eustachian tubes patent    Follow-up audiogram completed yesterday:  Right tympanogram Type B right ear suggesting restricted eardrum mobility.  Right thresholds are decreased-mixed loss mild to profound.  Speech reception thresholds are in good agreement with pure tone average.  Word discrimination scores are excellent at supra-thresholds level.         Medications:     Current Outpatient Rx   Medication Sig Dispense Refill     Acetaminophen 325 MG CAPS Take 325 mg by mouth 3 times daily       amLODIPine (NORVASC) 10 MG tablet Take 1 tablet (10 mg) by mouth daily 90 tablet 1     aspirin 81 MG tablet Take by mouth daily       atorvastatin (LIPITOR) 80 MG tablet Take 1 tablet (80 mg) by mouth daily 90 tablet 1     calcium carbonate-vitamin D 600-200 MG-UNIT CAPS Take by mouth 2 times daily        diphenhydrAMINE-acetaminophen (TYLENOL PM)  MG tablet Take 1 tablet by mouth nightly as needed for sleep       fluticasone (FLONASE) 50 MCG/ACT nasal spray Spray 2 sprays into both  nostrils daily 15.8 mL 0     furosemide (LASIX) 20 MG tablet Take 1 tablet (20 mg) by mouth daily 90 tablet 1     losartan (COZAAR) 50 MG tablet Take 1 tablet (50 mg) by mouth daily 90 tablet 1     Multiple Vitamins-Minerals (PRESERVISION AREDS 2 PO) Take 1 capsule by mouth 2 times daily       pregabalin (LYRICA) 150 MG capsule Take 1 capsule (150 mg) by mouth 3 times daily 270 capsule 1     ofloxacin (FLOXIN) 0.3 % otic solution Place 5 drops into the right ear daily for 7 days 5 mL 0            Allergies:     Allergies: Seasonal allergies          Past Medical History:     Past Medical History:   Diagnosis Date     Cataract     both eyes     Chronic back pain      DDD (degenerative disc disease), cervical      DDD (degenerative disc disease), lumbar      GERD (gastroesophageal reflux disease)      Hyperlipidemia      Lumbar radiculopathy     Dr. Borwn;  Spine; plan revision decompression and fusion L4/5     Macular degeneration     receives injections; Dr. Taveras in [place]     Prediabetes      Trochanteric bursitis of both hips     [facility]/p [facility]            Past Surgical History:     Past Surgical History:   Procedure Laterality Date     APPENDECTOMY       ARTHROSCOPY KNEE BILATERAL       BACK SURGERY  2000    lumbar L5/S1     CATARACT IOL, RT/LT Bilateral      COLONOSCOPY       COLONOSCOPY - HIM SCAN  09/09/2004    Colonoscopy & Polypectomy - a small polyp.  Path - benign colonic mucosa     disc replacement  2001    lumbar; single level L5/S1; Dr Cage  [facility] ECP WITH CATARACT SURGERY  2012    both eyes     NISSEN FUNDOPLICATION       SHOULDER SURGERY      Bilateral; twice left, once right; arthroscopies     SPINE SURGERY  02/03/2021    decompression hemilaminotomy descectomy L4/5; posterior spine fusion L4/5, trasnforaminal interbody fusion L4/5, hardware removal L5/S1; [facility]     TONSILLECTOMY         ENT family history reviewed          "Social History:     Social History     Tobacco Use     Smoking status: Former     Types: Cigarettes     Quit date: 1992     Years since quittin.9     Passive exposure: Never     Smokeless tobacco: Never   Vaping Use     Vaping Use: Never used   Substance Use Topics     Alcohol use: Never     Drug use: No            Review of Systems:     ROS: See HPI         Physical Exam:     /66 (BP Location: Right arm, Patient Position: Sitting, Cuff Size: Adult Large)   Pulse 62   Temp (!) 95.8  F (35.4  C) (Temporal)   Ht 1.829 m (6' 0.01\")   Wt 108.6 kg (239 lb 6.7 oz)   SpO2 98%   BMI 32.46 kg/m      General - The patient is well nourished and well developed, and appears to have good nutritional status.  Alert and oriented to person and place, answers questions and cooperates with examination appropriately.   Head and Face - Normocephalic and atraumatic, with no gross asymmetry noted.  The facial nerve is intact, with strong symmetric movements.  Voice and Breathing - The patient was breathing comfortably without the use of accessory muscles. There was no wheezing, stridor. The patients voice was clear and strong, and had appropriate pitch and quality.  Ears - examined under microscopy bilaterally.  The right canal is patent, right TM is intact with serous effusion.  The left canal is patent, left TM intact without effusion or retraction.  Eyes - Extraocular movements intact, sclera were not icteric or injected, conjunctiva were pink and moist.  Neck - Normal ROM, no palpable lymphadenopathy  Nose - External contour is symmetric, no gross deflection or scars.  Nasal mucosa is pink and moist with no abnormal mucus.      NP exam negative on 23         Assessment and Plan:       ICD-10-CM    1. ETD (Eustachian tube dysfunction), bilateral  H69.93       2. OME (otitis media with effusion), right  H65.91       3. Conductive hearing loss, unilateral  H90.2         Follow up for tube placement    Joselin" Nj BACK  [facility] ENT      Again, thank you for allowing me to participate in the care of your patient.        Sincerely,        Joselin Mauro NP

## 2023-12-13 NOTE — PROGRESS NOTES
"Otolaryngology Progress Note          Zoran Granado is a 83 year old male  Follow-up of right otitis media with effusion.  He had a right myringotomy on 11/14/2023.    He saw Joselin on 1213 and the effusion had reformed.    He noted improved hearing temporarily after the myringotomy and hearing loss has recurred.  He denies fluctuating hearing loss vertigo otorrhea or otalgia.    Negative nasopharyngoscopy on 11/14/2023    Right flat type B tympanogram with SRT 50 dB on 12/12/2023      Physical Exam  /75 (BP Location: Right arm, Patient Position: Sitting, Cuff Size: Adult Large)   Pulse 75   Temp 98  F (36.7  C) (Temporal)   Ht 1.829 m (6' 0.01\")   Wt 108.6 kg (239 lb 6.7 oz)   SpO2 97%   BMI 32.46 kg/m    General - The patient is well nourished and well developed, and appears to have good nutritional status.  Alert and oriented to person and place, interactive.    Ears- examined under microscopy bilaterally  External auditory canals are patent, left tympanic membrane is intact without effusion or worrisome retraction  Right with serous effusion, no retractions    RIGHT Myringotomy with Tube Placement    Procedure - I discussed the risks and complications of  RIGHT tympanostomy tube insertion  Including topical anesthesia, bleeding, infection, change in hearing or hearing loss, tympanic membrane perforation, need for additional surgery, chronic ear drainage, tube occlusion or need for tube reinsertion, cholesteatoma.   All questions were answered and the patient/and or guardian wishes are to proceed with surgical intervention.   After discussion of the risks and benefits of myringotomy.    I proceeded to position the patient in a supine position in the examination chair.  Using the binocular surgical microscope, I then proceeded to clean the  RIGHT canal of cerumen and squamous debris.  I was able to see the tympanic membrane.  Using a small cotton tipped applicator, I applied a tiny coating of " phenol onto the tympanic membrane.  After visualizing a good randi, I then proceeded to use a myringotomy knife to make a radially oriented incision in the tympanic membrane.  Thick glue ear noted.  Serous effusion removed with 5 and 3 suction.  Next, I proceeded to place a 1.14 mm tube through the incision.  After confirming good positioning and a clearly visible open tube, otic drops were applied and a cotton ball was inserted into the canal.              Impression/Plan  Zoran Granado is a 83 year old male    ICD-10-CM    1. COME (chronic otitis media with effusion), right  H65.491 phenol 89 % swab 1 Swab     ofloxacin (FLOXIN) 0.3 % otic solution 5 drop     DISCONTINUED: ofloxacin (OCUFLOX) 0.3 % ophthalmic solution 1 drop      2. S/P myringotomy with insertion of tube  Z96.22               Post tube written and verbal instructions provided     Complete otic drops as prescribed    Follow-up for tube check and audiogram as directed    Mila Fine D.O.  Otolaryngology/Head and Neck Surgery  Allergy

## 2023-12-14 ENCOUNTER — OFFICE VISIT (OUTPATIENT)
Dept: OTOLARYNGOLOGY | Facility: OTHER | Age: 83
End: 2023-12-14
Attending: OTOLARYNGOLOGY
Payer: COMMERCIAL

## 2023-12-14 VITALS
DIASTOLIC BLOOD PRESSURE: 75 MMHG | SYSTOLIC BLOOD PRESSURE: 137 MMHG | HEART RATE: 75 BPM | TEMPERATURE: 98 F | WEIGHT: 239.42 LBS | BODY MASS INDEX: 32.43 KG/M2 | OXYGEN SATURATION: 97 % | HEIGHT: 72 IN

## 2023-12-14 DIAGNOSIS — H65.491 COME (CHRONIC OTITIS MEDIA WITH EFFUSION), RIGHT: Primary | ICD-10-CM

## 2023-12-14 DIAGNOSIS — Z96.22 S/P MYRINGOTOMY WITH INSERTION OF TUBE: ICD-10-CM

## 2023-12-14 PROCEDURE — 69433 CREATE EARDRUM OPENING: CPT | Performed by: OTOLARYNGOLOGY

## 2023-12-14 PROCEDURE — 99213 OFFICE O/P EST LOW 20 MIN: CPT | Mod: 25 | Performed by: OTOLARYNGOLOGY

## 2023-12-14 PROCEDURE — G0463 HOSPITAL OUTPT CLINIC VISIT: HCPCS | Mod: 25

## 2023-12-14 PROCEDURE — 250N000009 HC RX 250: Performed by: OTOLARYNGOLOGY

## 2023-12-14 RX ORDER — OFLOXACIN 3 MG/ML
1 SOLUTION/ DROPS OPHTHALMIC ONCE
Status: DISCONTINUED | OUTPATIENT
Start: 2023-12-14 | End: 2023-12-14

## 2023-12-14 RX ORDER — OFLOXACIN 3 MG/ML
5 SOLUTION AURICULAR (OTIC) DAILY
Status: ACTIVE | OUTPATIENT
Start: 2023-12-14

## 2023-12-14 RX ADMIN — OFLOXACIN 5 DROP: 3 SOLUTION AURICULAR (OTIC) at 15:49

## 2023-12-14 ASSESSMENT — PAIN SCALES - GENERAL: PAINLEVEL: NO PAIN (0)

## 2023-12-14 NOTE — PATIENT INSTRUCTIONS
Thank you for allowing Dr. Fine and our ENT team to participate in your care.  If your medications are too expensive, please give the nurse a call.  We can possibly change this medication.  If you have a scheduling or an appointment question please contact our Health Unit Coordinator at their direct line [phone].   ALL nursing questions or concerns can be directed to your ENT nurse, Bryan, at: [phone]    Instructions for Myringotomy Tubes ( Ear Tubes)      Take one dose of liquid or chewable TYLENOL based on weight the morning of surgery.   If you can swallow pills you may take tylenol tablets with a small sip of water.  If you have any dosing questions ask your pharmacist.         Recovery - The placement of ear tubes is a brief operation, and therefore the recovery from the anesthetic is usually less than a day.  However, in young children the sleep patterns, feeding, and behavior can be altered for several days.  Try to return to the daily routine as soon as possible and this issue will resolve without problems.  There are no restrictions to diet or activity after ear tube placement.    Medications - Children and adults can return to all preoperative medications after this procedure, including blood thinners.  You were sent home with ear drops, please use them as directed to assist in the rapid healing of the ear drum around the tube.  Pain medication may have been sent home with you, but a vast majority of the time, over the counter Tylenol or ibuprofen (advil) I sufficient. Finish prescription ear drops (4 drops twice a day).     Complications - A low grade fever (up to 100 degrees ) is not unusual in the day after tubes are placed.  Treat this with cool wash cloths to the forehead and Tylenol.  If the fever is higher, or does not respond to medication, call the Doctor s office or call service after hours.  A small amount of bloody drainage can occur for a day or two after ear tubes, and is  perfectly normal, continue the ear drops as directed and it will clear up.    Water Precautions - Recent clinical research has shown that absolute water precautions are not always necessary.  Ear plugs or water head bands are not necessary unless the ear is actively draining, or if your child does not like the sensation of water in the ear.    Follow up - Approximately 1 month after the tubes are placed I like to examine the ears to make sure there are no signs of complications, which are extremely rare.  You should already have an appointment in 1 month with ENT PA and audiology.  If not, call our office at [phone].  In some unusual cases the ears  reject  the tubes.  Depending on the situation, a hearing test may or may not be performed at that time.  Afterwards, follow up is done every 6 months, but of course earlier if there are any issues or problems.    Advantages of Tubes - After ear tube placement, there are certain benefits from having a direct communication of the middle ear space with the ear canal.  In the event of drainage from the ears with ear tubes in place ( which is common with colds and flus ) use the ear drops you were discharged home with using the same dosage and instructions.  This will clear up the ears without the need for oral antibiotics a majority of the time.  Another advantage is that with tubes in place, the ears automatically adjust to changes in atmospheric pressure ( such as in airplanes or elevation ).  In other words, if the tubes are open the ears will not hurt or pop!

## 2023-12-14 NOTE — LETTER
"    12/14/2023         RE: Zoran Granado  [address]  [address]  [facility] [address]        Dear Colleague,    Thank you for referring your patient, Zoran Granado, to the [facility]. Please see a copy of my visit note below.    Otolaryngology Progress Note          Zoran Granado is a 83 year old male  Follow-up of right otitis media with effusion.  He had a right myringotomy on 11/14/2023.    He saw Joselin on 1213 and the effusion had reformed.    He noted improved hearing temporarily after the myringotomy and hearing loss has recurred.  He denies fluctuating hearing loss vertigo otorrhea or otalgia.    Negative nasopharyngoscopy on 11/14/2023    Right flat type B tympanogram with SRT 50 dB on 12/12/2023      Physical Exam  /75 (BP Location: Right arm, Patient Position: Sitting, Cuff Size: Adult Large)   Pulse 75   Temp 98  F (36.7  C) (Temporal)   Ht 1.829 m (6' 0.01\")   Wt 108.6 kg (239 lb 6.7 oz)   SpO2 97%   BMI 32.46 kg/m    General - The patient is well nourished and well developed, and appears to have good nutritional status.  Alert and oriented to person and place, interactive.    Ears- examined under microscopy bilaterally  External auditory canals are patent, left tympanic membrane is intact without effusion or worrisome retraction  Right with serous effusion, no retractions    RIGHT Myringotomy with Tube Placement    Procedure - I discussed the risks and complications of  RIGHT tympanostomy tube insertion  Including topical anesthesia, bleeding, infection, change in hearing or hearing loss, tympanic membrane perforation, need for additional surgery, chronic ear drainage, tube occlusion or need for tube reinsertion, cholesteatoma.   All questions were answered and the patient/and or guardian wishes are to proceed with surgical intervention.   After discussion of the risks and benefits of myringotomy.    I proceeded to position the patient in a supine position " in the examination chair.  Using the binocular surgical microscope, I then proceeded to clean the  RIGHT canal of cerumen and squamous debris.  I was able to see the tympanic membrane.  Using a small cotton tipped applicator, I applied a tiny coating of phenol onto the tympanic membrane.  After visualizing a good randi, I then proceeded to use a myringotomy knife to make a radially oriented incision in the tympanic membrane.  Thick glue ear noted.  Serous effusion removed with 5 and 3 suction.  Next, I proceeded to place a 1.14 mm tube through the incision.  After confirming good positioning and a clearly visible open tube, otic drops were applied and a cotton ball was inserted into the canal.              Impression/Plan  Zoran Granado is a 83 year old male    ICD-10-CM    1. COME (chronic otitis media with effusion), right  H65.491 phenol 89 % swab 1 Swab     ofloxacin (FLOXIN) 0.3 % otic solution 5 drop     DISCONTINUED: ofloxacin (OCUFLOX) 0.3 % ophthalmic solution 1 drop      2. S/P myringotomy with insertion of tube  Z96.22               Post tube written and verbal instructions provided     Complete otic drops as prescribed    Follow-up for tube check and audiogram as directed    Mila Fine D.O.  Otolaryngology/Head and Neck Surgery  Allergy                    Again, thank you for allowing me to participate in the care of your patient.        Sincerely,        Mila Fine MD

## 2023-12-15 ENCOUNTER — TELEPHONE (OUTPATIENT)
Dept: OTOLARYNGOLOGY | Facility: OTHER | Age: 83
End: 2023-12-15

## 2023-12-15 RX ORDER — OFLOXACIN 3 MG/ML
5 SOLUTION AURICULAR (OTIC) DAILY
Qty: 5 ML | Refills: 0 | Status: SHIPPED | OUTPATIENT
Start: 2023-12-15 | End: 2023-12-22

## 2023-12-15 NOTE — TELEPHONE ENCOUNTER
Patient's wife, Jeannette, called asking if patient was supposed to have a prescription for ear drops. Patient is s/p Right Myringotomy with tube placement 12/14. Verified that patient should have had drops sent to [facility], but no prescription was sent. Will defer to provider for prescription and call Jeannette back when sent.

## 2024-01-09 DIAGNOSIS — H91.93 DECREASED HEARING OF BOTH EARS: Primary | ICD-10-CM

## 2024-01-16 ENCOUNTER — OFFICE VISIT (OUTPATIENT)
Dept: OTOLARYNGOLOGY | Facility: OTHER | Age: 84
End: 2024-01-16
Attending: NURSE PRACTITIONER
Payer: COMMERCIAL

## 2024-01-16 ENCOUNTER — OFFICE VISIT (OUTPATIENT)
Dept: AUDIOLOGY | Facility: OTHER | Age: 84
End: 2024-01-16
Attending: NURSE PRACTITIONER
Payer: COMMERCIAL

## 2024-01-16 VITALS
OXYGEN SATURATION: 92 % | BODY MASS INDEX: 32.51 KG/M2 | SYSTOLIC BLOOD PRESSURE: 120 MMHG | HEART RATE: 74 BPM | DIASTOLIC BLOOD PRESSURE: 70 MMHG | TEMPERATURE: 97.7 F | WEIGHT: 240 LBS | HEIGHT: 72 IN

## 2024-01-16 DIAGNOSIS — H90.3 SENSORINEURAL HEARING LOSS, BILATERAL: ICD-10-CM

## 2024-01-16 DIAGNOSIS — H90.3 SENSORINEURAL HEARING LOSS (SNHL) OF BOTH EARS: Primary | ICD-10-CM

## 2024-01-16 DIAGNOSIS — Z96.22 STATUS POST MYRINGOTOMY WITH INSERTION OF TUBE: Primary | ICD-10-CM

## 2024-01-16 DIAGNOSIS — H91.93 DECREASED HEARING OF BOTH EARS: ICD-10-CM

## 2024-01-16 PROCEDURE — G0463 HOSPITAL OUTPT CLINIC VISIT: HCPCS | Mod: 25

## 2024-01-16 PROCEDURE — 99212 OFFICE O/P EST SF 10 MIN: CPT | Performed by: NURSE PRACTITIONER

## 2024-01-16 PROCEDURE — 92556 SPEECH AUDIOMETRY COMPLETE: CPT | Mod: 52 | Performed by: AUDIOLOGIST

## 2024-01-16 PROCEDURE — 92552 PURE TONE AUDIOMETRY AIR: CPT | Mod: 52 | Performed by: AUDIOLOGIST

## 2024-01-16 ASSESSMENT — PAIN SCALES - GENERAL: PAINLEVEL: NO PAIN (0)

## 2024-01-16 NOTE — LETTER
1/16/2024         RE: Zoran Granado  [address]  [address]  [facility] [address]        Dear Colleague,    Thank you for referring your patient, Zoran Granado, to the [facility]. Please see a copy of my visit note below.    Otolaryngology Note         Chief Complaint:     Patient presents with:  Surgical Followup: S/P 12/14 Tubes            History of Present Illness:     Zoran Granado is a 84 year old male seen today for follow-up bilateral myringotomy with tube placement completed on 12/14/2023 by Dr. Fine.    No otalgia or otorrhea.    He feels like his hearing has improved significantly   No otorrhea  Or bothersome tinnitus, vertigo, flux hearing      Audiogram (post operative) completed 1/16/2024:  Tympanogram could not be completed due to unable to pressurize.   Thresholds are resolved to sensorineural heairng loss. levels. Binaural hearing aids recommended.  Speech reception thresholds are in good agreement with pure tone average.  Word discrimination scores are excellent at supra-thresholds level.    Audiogram completed 11/14/2023 (preoperative)  Thresholds are noderate to mild mixed loss right and sensorineura left sloping to severe range left and profound right.  Speech reception thresholds are in good agreement with pure tone average.  Word discrimination scores are excellent at supra-thresholds level.         Medications:     Current Outpatient Rx   Medication Sig Dispense Refill     Acetaminophen 325 MG CAPS Take 325 mg by mouth 3 times daily       amLODIPine (NORVASC) 10 MG tablet Take 1 tablet (10 mg) by mouth daily 90 tablet 1     aspirin 81 MG tablet Take by mouth daily       atorvastatin (LIPITOR) 80 MG tablet Take 1 tablet (80 mg) by mouth daily 90 tablet 1     calcium carbonate-vitamin D 600-200 MG-UNIT CAPS Take by mouth 2 times daily        diphenhydrAMINE-acetaminophen (TYLENOL PM)  MG tablet Take 1 tablet by mouth nightly as needed for sleep        fluticasone (FLONASE) 50 MCG/ACT nasal spray Spray 2 sprays into both nostrils daily 15.8 mL 0     furosemide (LASIX) 20 MG tablet Take 1 tablet (20 mg) by mouth daily 90 tablet 1     losartan (COZAAR) 50 MG tablet Take 1 tablet (50 mg) by mouth daily 90 tablet 1     Multiple Vitamins-Minerals (PRESERVISION AREDS 2 PO) Take 1 capsule by mouth 2 times daily       pregabalin (LYRICA) 150 MG capsule Take 1 capsule (150 mg) by mouth 3 times daily 270 capsule 1            Allergies:     Allergies: Seasonal allergies          Past Medical History:     Past Medical History:   Diagnosis Date     Cataract     both eyes     Chronic back pain      DDD (degenerative disc disease), cervical      DDD (degenerative disc disease), lumbar      GERD (gastroesophageal reflux disease)      Hyperlipidemia      Lumbar radiculopathy     Dr. Brown;  Spine; plan revision decompression and fusion L4/5     Macular degeneration     receives injections; Dr. Taveras in [place]     Prediabetes      Trochanteric bursitis of both hips     s/p bilateral [facility] - [facility]            Past Surgical History:     Past Surgical History:   Procedure Laterality Date     APPENDECTOMY       ARTHROSCOPY KNEE BILATERAL       BACK SURGERY  2000    lumbar L5/S1     CATARACT IOL, RT/LT Bilateral      COLONOSCOPY       COLONOSCOPY - HIM SCAN  09/09/2004    Colonoscopy & Polypectomy - a small polyp.  Path - benign colonic mucosa     disc replacement  2001    lumbar; single level L5/S1; Dr Cage - [facility] ECP WITH CATARACT SURGERY  2012    both eyes     NISSEN FUNDOPLICATION       SHOULDER SURGERY      Bilateral; twice left, once right; arthroscopies     SPINE SURGERY  02/03/2021    decompression hemilaminotomy descectomy L4/5; posterior spine fusion L4/5, trasnforaminal interbody fusion L4/5, hardware removal L5/S1; [facility]     TONSILLECTOMY         ENT family history reviewed         Social History:     Social  History     Tobacco Use     Smoking status: Former     Types: Cigarettes     Quit date: 1992     Years since quittin.0     Passive exposure: Never     Smokeless tobacco: Never   Vaping Use     Vaping Use: Never used   Substance Use Topics     Alcohol use: Never     Drug use: No            Review of Systems:     ROS: See HPI         Physical Exam:     /70 (BP Location: Right arm, Patient Position: Sitting, Cuff Size: Adult Large)   Pulse 74   Temp 97.7  F (36.5  C) (Tympanic)   Ht 1.829 m (6')   Wt 108.9 kg (240 lb)   SpO2 92%   BMI 32.55 kg/m      General - The patient is well nourished and well developed, and appears to have good nutritional status.  Alert and oriented to person and place, answers questions and cooperates with examination appropriately.   Head and Face - Normocephalic and atraumatic, with no gross asymmetry noted.  The facial nerve is intact, with strong symmetric movements.  Voice and Breathing - The patient was breathing comfortably without the use of accessory muscles. There was no wheezing, stridor. The patients voice was clear and strong, and had appropriate pitch and quality.  Ears - External ear normal.  Right canal is patent, right tympanic membrane has patent appearing PE tube.  No concerns for effusion or retraction.  The left canal is patent, left tympanic membrane is intact without ju effusion or retraction.  Eyes - Extraocular movements intact, sclera were not icteric or injected, conjunctiva were pink and moist.         Assessment and Plan:       ICD-10-CM    1. Status post myringotomy with insertion of tube  Z96.22     Right      2. Sensorineural hearing loss, bilateral  H90.3         Follow-up in 6 months for tube check  Up sooner with concerns, otorrhea, changes in hearing    Joselin Mauro NP-C  [facility] ENT    Again, thank you for allowing me to participate in the care of your patient.        Sincerely,        Joselin Mauro NP

## 2024-01-16 NOTE — PATIENT INSTRUCTIONS
Thank you for allowing Joselin Mauro and our ENT team to participate in your care.  If your medications are too expensive, please give the nurse a call.  We can possibly change this medication.  If you have a scheduling or an appointment question please contact our Health Unit Coordinator at their direct line [phone].   ALL nursing questions or concerns can be directed to your ENT nurse at: [phone] - Qgt

## 2024-01-16 NOTE — PROGRESS NOTES
Audiology Evaluation Completed. Please refer SCANNED AUDIOGRAM and/or TYMPANOGRAM for BACKGROUND, RESULTS, RECOMMENDATIONS.      Mary OVALLE, [facility]  Audiologist #[id number]

## 2024-01-19 NOTE — PROGRESS NOTES
Otolaryngology Note         Chief Complaint:     Patient presents with:  Surgical Followup: S/P 12/14 Tubes            History of Present Illness:     Zoran Granado is a 84 year old male seen today for follow-up bilateral myringotomy with tube placement completed on 12/14/2023 by Dr. Fine.    No otalgia or otorrhea.    He feels like his hearing has improved significantly   No otorrhea  Or bothersome tinnitus, vertigo, flux hearing      Audiogram (post operative) completed 1/16/2024:  Tympanogram could not be completed due to unable to pressurize.   Thresholds are resolved to sensorineural heairng loss. levels. Binaural hearing aids recommended.  Speech reception thresholds are in good agreement with pure tone average.  Word discrimination scores are excellent at supra-thresholds level.    Audiogram completed 11/14/2023 (preoperative)  Thresholds are noderate to mild mixed loss right and sensorineura left sloping to severe range left and profound right.  Speech reception thresholds are in good agreement with pure tone average.  Word discrimination scores are excellent at supra-thresholds level.         Medications:     Current Outpatient Rx   Medication Sig Dispense Refill    Acetaminophen 325 MG CAPS Take 325 mg by mouth 3 times daily      amLODIPine (NORVASC) 10 MG tablet Take 1 tablet (10 mg) by mouth daily 90 tablet 1    aspirin 81 MG tablet Take by mouth daily      atorvastatin (LIPITOR) 80 MG tablet Take 1 tablet (80 mg) by mouth daily 90 tablet 1    calcium carbonate-vitamin D 600-200 MG-UNIT CAPS Take by mouth 2 times daily       diphenhydrAMINE-acetaminophen (TYLENOL PM)  MG tablet Take 1 tablet by mouth nightly as needed for sleep      fluticasone (FLONASE) 50 MCG/ACT nasal spray Spray 2 sprays into both nostrils daily 15.8 mL 0    furosemide (LASIX) 20 MG tablet Take 1 tablet (20 mg) by mouth daily 90 tablet 1    losartan (COZAAR) 50 MG tablet Take 1 tablet (50 mg) by mouth daily 90 tablet  1    Multiple Vitamins-Minerals (PRESERVISION AREDS 2 PO) Take 1 capsule by mouth 2 times daily      pregabalin (LYRICA) 150 MG capsule Take 1 capsule (150 mg) by mouth 3 times daily 270 capsule 1            Allergies:     Allergies: Seasonal allergies          Past Medical History:     Past Medical History:   Diagnosis Date    Cataract     both eyes    Chronic back pain     DDD (degenerative disc disease), cervical     DDD (degenerative disc disease), lumbar     GERD (gastroesophageal reflux disease)     Hyperlipidemia     Lumbar radiculopathy     Dr. Brown;  Spine; plan revision decompression and fusion L4/5    Macular degeneration     receives injections; Dr. Taveras in [place]    Prediabetes     Trochanteric bursitis of both hips     s/p bilateral [facility] - [facility]            Past Surgical History:     Past Surgical History:   Procedure Laterality Date    APPENDECTOMY      ARTHROSCOPY KNEE BILATERAL      BACK SURGERY      lumbar L5/S1    CATARACT IOL, RT/LT Bilateral     COLONOSCOPY      COLONOSCOPY - HIM SCAN  2004    Colonoscopy & Polypectomy - a small polyp.  Path - benign colonic mucosa    disc replacement      lumbar; single level L5/S1; Dr Cage - [facility]    HC ECP WITH CATARACT SURGERY      both eyes    NISSEN FUNDOPLICATION      SHOULDER SURGERY      Bilateral; twice left, once right; arthroscopies    SPINE SURGERY  2021    decompression hemilaminotomy descectomy L4/5; posterior spine fusion L4/5, trasnforaminal interbody fusion L4/5, hardware removal L5/S1; Dr [facility]    TONSILLECTOMY         ENT family history reviewed         Social History:     Social History     Tobacco Use    Smoking status: Former     Types: Cigarettes     Quit date: 1992     Years since quittin.0     Passive exposure: Never    Smokeless tobacco: Never   Vaping Use    Vaping Use: Never used   Substance Use Topics    Alcohol use: Never    Drug use: No             Review of Systems:     ROS: See HPI         Physical Exam:     /70 (BP Location: Right arm, Patient Position: Sitting, Cuff Size: Adult Large)   Pulse 74   Temp 97.7  F (36.5  C) (Tympanic)   Ht 1.829 m (6')   Wt 108.9 kg (240 lb)   SpO2 92%   BMI 32.55 kg/m      General - The patient is well nourished and well developed, and appears to have good nutritional status.  Alert and oriented to person and place, answers questions and cooperates with examination appropriately.   Head and Face - Normocephalic and atraumatic, with no gross asymmetry noted.  The facial nerve is intact, with strong symmetric movements.  Voice and Breathing - The patient was breathing comfortably without the use of accessory muscles. There was no wheezing, stridor. The patients voice was clear and strong, and had appropriate pitch and quality.  Ears - External ear normal.  Right canal is patent, right tympanic membrane has patent appearing PE tube.  No concerns for effusion or retraction.  The left canal is patent, left tympanic membrane is intact without ju effusion or retraction.  Eyes - Extraocular movements intact, sclera were not icteric or injected, conjunctiva were pink and moist.         Assessment and Plan:       ICD-10-CM    1. Status post myringotomy with insertion of tube  Z96.22     Right      2. Sensorineural hearing loss, bilateral  H90.3         Follow-up in 6 months for tube check  Up sooner with concerns, otorrhea, changes in hearing    Joselin Mauro NP-C  [facility]

## 2024-02-08 NOTE — PROGRESS NOTES
Assessment & Plan     Chronic pain syndrome  Stable with Lyrica.  Back pain, right leg radicular pain with sitting - resolves with laying down, resting.  Feels pain control is adequate.  Contract updated.  Prefers to avoid any surgery/procedure.  If pain worsening or if other deficits - weakness, etc - plan to update MRI    DDD (degenerative disc disease), lumbar  As above  - pregabalin (LYRICA) 150 MG capsule; Take 1 capsule (150 mg) by mouth 3 times daily    Hyperlipidemia, unspecified hyperlipidemia type  Stable.  Continue statin.  Fasting labs due 11/2024    Essential hypertension  Stable.    Prediabetes  Stable.   Annual A1c due11/2024  - Hemoglobin A1c; Future  - Basic metabolic panel; Future    Atherosclerosis of native coronary artery of native heart without angina pectoris  Secondary prevention, risk reduction    Stage 3a chronic kidney disease (H)  Stable.  - Hemoglobin A1c; Future  - Basic metabolic panel; Future    Neuropathy  stable  - pregabalin (LYRICA) 150 MG capsule; Take 1 capsule (150 mg) by mouth 3 times daily        See Patient Instructions    Return in about 3 months (around 5/9/2024) for chronic pain; diabetes; non fasting labs.    Rosalio Meehan is a 84 year old, presenting for the following health issues:  Pain, Diabetes, Hypertension, Lipids, Depression, Anxiety, and Kidney Problem (CKD)    HPI     Hyperlipidemia Follow-Up and prediabetes  Are you regularly taking any medication or supplement to lower your cholesterol?   Yes- Atorvastatin  Are you having muscle aches or other side effects that you think could be caused by your cholesterol lowering medication?  No    Hypertension Follow-up  Do you check your blood pressure regularly outside of the clinic? Yes sometimes  Are you following a low salt diet? No  Are your blood pressures ever more than 140 on the top number (systolic) OR more   than 90 on the bottom number (diastolic), for example 140/90? No    Chronic Kidney Disease  Follow-up  Do you take any over the counter pain medicine?: Yes  What over the counter medicine are you taking for your pain?:  Apap, Ibuprofen    How often do you take this medicine?:  One time daily for each    Depression and Anxiety Follow-Up  How are you doing with your depression since your last visit? No change  How are you doing with your anxiety since your last visit?  No change  Are you having other symptoms that might be associated with depression or anxiety? No  Have you had a significant life event? No   Do you have any concerns with your use of alcohol or other drugs? No    Social History     Tobacco Use    Smoking status: Former     Types: Cigarettes     Quit date: 1992     Years since quittin.1     Passive exposure: Never    Smokeless tobacco: Never   Vaping Use    Vaping Use: Never used   Substance Use Topics    Alcohol use: Never    Drug use: No         9/3/2021    11:00 AM 10/27/2022     9:00 AM 2023     8:49 AM   PHQ   PHQ-9 Total Score 5 0 4   Q9: Thoughts of better off dead/self-harm past 2 weeks Not at all Not at all Not at all         2023     9:34 AM 2023     9:27 AM 2023     8:51 AM   ROBYN-7 SCORE   Total Score   0 (minimal anxiety)   Total Score 0 0 0       Suicide Assessment Five-step Evaluation and Treatment (SAFE-T)    Pain History:  When did you first notice your pain? Many years ago, fell from scaffolding   Have you seen this provider for your pain in the past? Yes   Where in your body do you have pain? Low Back  Are you seeing anyone else for your pain? No  Just right leg - to ankle  Constant - all day  Ok at night - not interfering with sleep.  Only with sitting.  Prefers to avoid surgery   Naps in the afternoon.   Surgery 2021        9/3/2021    11:00 AM 10/27/2022     9:00 AM 2023     8:49 AM   PHQ-9 SCORE   PHQ-9 Total Score MyChart   4 (Minimal depression)   PHQ-9 Total Score 5 0 4           2023     9:34 AM 2023     9:27 AM 2023      8:51 AM   ROBYN-7 SCORE   Total Score   0 (minimal anxiety)   Total Score 0 0 0         PDMP Review         Value Time User    State PDMP site checked  Yes 2/9/2024  7:53 AM Evelyn Olson MD          Last CSA Agreement:   CSA -- Patient Level:     [Media Unavailable] Controlled Substance Agreement - Opioid - Scan on 2/14/2023  1:22 PM: OPIOID/OPIOID PLUS CONTROLLED SUBSTANCE AGREEMENT   [Media Unavailable] Controlled Substance Agreement - Opioid - Scan on 10/29/2021  9:35 AM: OPIOD/OPIOD PLUS CONTROLLED SUBSTANCE AGREEMENT   [Media Unavailable] Controlled Substance Agreement - Opioid - Scan on 10/30/2020 10:59 AM: OPIOD/OPIOD PLUS CONTROLLED SUBSTANCE AGREEMENT   [Media Unavailable] Controlled Substance Agreement - Opioid - Scan on 7/26/2019 12:01 PM: OPIOD/OPIOD PLUS CONTROLLED SUBSTANCE AGREEMENT       Last UDS: 11/13/2023        Review of Systems  Constitutional, HEENT, cardiovascular, pulmonary, gi and gu systems are negative, except as otherwise noted.      Objective    /64   Pulse 62   Temp 97.4  F (36.3  C) (Tympanic)   Resp 16   Ht 1.829 m (6')   Wt 108.8 kg (239 lb 12.8 oz)   SpO2 95%   BMI 32.52 kg/m    Body mass index is 32.52 kg/m .  Physical Exam   GENERAL: alert, no distress, and over weight  EYES: Eyes grossly normal to inspection, PERRL and conjunctivae and sclerae normal  NECK: no adenopathy, no asymmetry, masses, or scars  RESP: lungs clear to auscultation - no rales, rhonchi or wheezes  CV: regular rate and rhythm, normal S1 S2, no S3 or S4, no murmur, click or rub, no peripheral edema  MS: normal muscle tone, no edema, and spine non tender to palpation; bilateral SLR negative today; symmetric strength, reflexes, and sensation  SKIN: no suspicious lesions or rashes  NEURO: Normal strength and tone, mentation intact and speech normal  PSYCH: mentation appears normal, affect normal/bright          Signed Electronically by: Evelyn Olson MD

## 2024-02-09 ENCOUNTER — OFFICE VISIT (OUTPATIENT)
Dept: FAMILY MEDICINE | Facility: OTHER | Age: 84
End: 2024-02-09
Attending: FAMILY MEDICINE
Payer: COMMERCIAL

## 2024-02-09 VITALS
HEART RATE: 62 BPM | SYSTOLIC BLOOD PRESSURE: 134 MMHG | TEMPERATURE: 97.4 F | BODY MASS INDEX: 32.48 KG/M2 | HEIGHT: 72 IN | DIASTOLIC BLOOD PRESSURE: 64 MMHG | WEIGHT: 239.8 LBS | OXYGEN SATURATION: 95 % | RESPIRATION RATE: 16 BRPM

## 2024-02-09 DIAGNOSIS — G89.4 CHRONIC PAIN SYNDROME: Primary | ICD-10-CM

## 2024-02-09 DIAGNOSIS — G62.9 NEUROPATHY: ICD-10-CM

## 2024-02-09 DIAGNOSIS — I25.10 ATHEROSCLEROSIS OF NATIVE CORONARY ARTERY OF NATIVE HEART WITHOUT ANGINA PECTORIS: ICD-10-CM

## 2024-02-09 DIAGNOSIS — E78.5 HYPERLIPIDEMIA, UNSPECIFIED HYPERLIPIDEMIA TYPE: ICD-10-CM

## 2024-02-09 DIAGNOSIS — I10 ESSENTIAL HYPERTENSION: ICD-10-CM

## 2024-02-09 DIAGNOSIS — M51.369 DDD (DEGENERATIVE DISC DISEASE), LUMBAR: ICD-10-CM

## 2024-02-09 DIAGNOSIS — R73.03 PREDIABETES: ICD-10-CM

## 2024-02-09 DIAGNOSIS — N18.31 STAGE 3A CHRONIC KIDNEY DISEASE (H): ICD-10-CM

## 2024-02-09 PROCEDURE — 99213 OFFICE O/P EST LOW 20 MIN: CPT | Performed by: FAMILY MEDICINE

## 2024-02-09 PROCEDURE — G0463 HOSPITAL OUTPT CLINIC VISIT: HCPCS

## 2024-02-09 RX ORDER — PREGABALIN 150 MG/1
150 CAPSULE ORAL 3 TIMES DAILY
Qty: 270 CAPSULE | Refills: 1 | Status: SHIPPED | OUTPATIENT
Start: 2024-02-09 | End: 2024-05-13 | Stop reason: DRUGHIGH

## 2024-02-09 ASSESSMENT — PAIN SCALES - GENERAL: PAINLEVEL: MODERATE PAIN (5)

## 2024-02-09 NOTE — LETTER

## 2024-02-27 DIAGNOSIS — E78.5 HYPERLIPIDEMIA, UNSPECIFIED HYPERLIPIDEMIA TYPE: ICD-10-CM

## 2024-02-28 RX ORDER — ATORVASTATIN CALCIUM 80 MG/1
80 TABLET, FILM COATED ORAL DAILY
Qty: 90 TABLET | Refills: 1 | Status: SHIPPED | OUTPATIENT
Start: 2024-02-28 | End: 2024-08-14

## 2024-02-28 NOTE — TELEPHONE ENCOUNTER
Lipitor       Last Written Prescription Date:  8/09/2023  Last Fill Quantity: 90,   # refills: 1  Last Office Visit: 2/09/2024  Future Office visit:    Next 5 appointments (look out 90 days)      May 13, 2024  9:15 AM  (Arrive by 9:00 AM)  SHORT with Evelyn Olson MD  [facility] [place] ([facility] [place] ) [address]  [place] [address]  [phone]

## 2024-03-12 DIAGNOSIS — I10 ESSENTIAL HYPERTENSION: ICD-10-CM

## 2024-03-12 RX ORDER — AMLODIPINE BESYLATE 10 MG/1
10 TABLET ORAL DAILY
Qty: 90 TABLET | Refills: 0 | Status: SHIPPED | OUTPATIENT
Start: 2024-03-12 | End: 2024-06-11

## 2024-03-12 NOTE — TELEPHONE ENCOUNTER
Amlodipine 10 mg       Last Written Prescription Date:  8/9/23  Last Fill Quantity: 90,   # refills: 1  Last Office Visit: 2/9/24  Future Office visit:    Next 5 appointments (look out 90 days)      May 13, 2024  9:15 AM  (Arrive by 9:00 AM)  SHORT with Evelyn Olson MD  [facility] ([facility] ) [address]  [place] [address]  [phone]             Routing refill request to provider for review/approval because:  Calcium Channel Blockers Protocol  Failed      Normal serum creatinine on file in past 12 months        Recent Labs   Lab Test 11/09/23  0836   CR 1.21*

## 2024-03-19 DIAGNOSIS — R60.0 EDEMA, LOWER EXTREMITY: ICD-10-CM

## 2024-03-19 DIAGNOSIS — I10 ESSENTIAL HYPERTENSION: ICD-10-CM

## 2024-03-19 NOTE — TELEPHONE ENCOUNTER
Lasix 20 mg      Last Written Prescription Date:  8/9/23  Last Fill Quantity: 90,   # refills: 1  Last Office Visit: 2/9/24  Future Office visit:    Next 5 appointments (look out 90 days)      May 13, 2024  9:15 AM  (Arrive by 9:00 AM)  SHORT with Evelyn Olson MD  [facility] ([facility] [place] ) [address]  [place] [address]  [phone]             Routing refill request to provider for review/approval because:  Diuretics (Including Combos) Protocol Failed      Has GFR on file in past 12 months and most recent value is normal

## 2024-03-20 RX ORDER — FUROSEMIDE 20 MG
20 TABLET ORAL DAILY
Qty: 90 TABLET | Refills: 0 | Status: SHIPPED | OUTPATIENT
Start: 2024-03-20 | End: 2024-06-11

## 2024-04-18 DIAGNOSIS — I10 ESSENTIAL HYPERTENSION: ICD-10-CM

## 2024-04-18 RX ORDER — LOSARTAN POTASSIUM 50 MG/1
50 TABLET ORAL DAILY
Qty: 90 TABLET | Refills: 0 | Status: SHIPPED | OUTPATIENT
Start: 2024-04-18 | End: 2024-07-16

## 2024-04-18 NOTE — TELEPHONE ENCOUNTER
Losartan Potassium (Cozaar) 50 mg tablet  Take 1 tablet (50 mg) by mouth daily     Last Written Prescription Date:  8-9-2023  Last Fill Quantity: 90 tablet,   # refills: 1  Last Office Visit: 2-9-2024  Future Office visit:    Next 5 appointments (look out 90 days)      May 13, 2024  9:15 AM  (Arrive by 9:00 AM)  SHORT with Evelyn Olson MD  [facility] [place] ([facility] [place] ) [address]  [place] [address]  [phone]

## 2024-05-13 ENCOUNTER — ANCILLARY PROCEDURE (OUTPATIENT)
Dept: GENERAL RADIOLOGY | Facility: OTHER | Age: 84
End: 2024-05-13
Attending: FAMILY MEDICINE
Payer: COMMERCIAL

## 2024-05-13 ENCOUNTER — OFFICE VISIT (OUTPATIENT)
Dept: FAMILY MEDICINE | Facility: OTHER | Age: 84
End: 2024-05-13
Attending: FAMILY MEDICINE
Payer: COMMERCIAL

## 2024-05-13 VITALS
OXYGEN SATURATION: 96 % | DIASTOLIC BLOOD PRESSURE: 60 MMHG | WEIGHT: 242.7 LBS | SYSTOLIC BLOOD PRESSURE: 128 MMHG | HEART RATE: 62 BPM | TEMPERATURE: 97.1 F | BODY MASS INDEX: 32.87 KG/M2 | HEIGHT: 72 IN

## 2024-05-13 DIAGNOSIS — M54.16 LUMBAR RADICULOPATHY: ICD-10-CM

## 2024-05-13 DIAGNOSIS — M79.674 PAIN OF TOE OF RIGHT FOOT: ICD-10-CM

## 2024-05-13 DIAGNOSIS — M51.369 DDD (DEGENERATIVE DISC DISEASE), LUMBAR: ICD-10-CM

## 2024-05-13 DIAGNOSIS — G62.9 NEUROPATHY: ICD-10-CM

## 2024-05-13 DIAGNOSIS — E78.5 HYPERLIPIDEMIA, UNSPECIFIED HYPERLIPIDEMIA TYPE: ICD-10-CM

## 2024-05-13 DIAGNOSIS — N18.31 STAGE 3A CHRONIC KIDNEY DISEASE (H): ICD-10-CM

## 2024-05-13 DIAGNOSIS — R73.03 PREDIABETES: ICD-10-CM

## 2024-05-13 DIAGNOSIS — I10 ESSENTIAL HYPERTENSION: ICD-10-CM

## 2024-05-13 DIAGNOSIS — G89.4 CHRONIC PAIN SYNDROME: ICD-10-CM

## 2024-05-13 DIAGNOSIS — G89.4 CHRONIC PAIN SYNDROME: Primary | ICD-10-CM

## 2024-05-13 LAB
ANION GAP SERPL CALCULATED.3IONS-SCNC: 9 MMOL/L (ref 7–15)
BUN SERPL-MCNC: 19.8 MG/DL (ref 8–23)
CALCIUM SERPL-MCNC: 9.8 MG/DL (ref 8.8–10.2)
CHLORIDE SERPL-SCNC: 104 MMOL/L (ref 98–107)
CREAT SERPL-MCNC: 1.27 MG/DL (ref 0.67–1.17)
DEPRECATED HCO3 PLAS-SCNC: 25 MMOL/L (ref 22–29)
EGFRCR SERPLBLD CKD-EPI 2021: 56 ML/MIN/1.73M2
EST. AVERAGE GLUCOSE BLD GHB EST-MCNC: 120 MG/DL
GLUCOSE SERPL-MCNC: 99 MG/DL (ref 70–99)
HBA1C MFR BLD: 5.8 %
POTASSIUM SERPL-SCNC: 4.5 MMOL/L (ref 3.4–5.3)
SODIUM SERPL-SCNC: 138 MMOL/L (ref 135–145)

## 2024-05-13 PROCEDURE — 99214 OFFICE O/P EST MOD 30 MIN: CPT | Performed by: FAMILY MEDICINE

## 2024-05-13 PROCEDURE — 36415 COLL VENOUS BLD VENIPUNCTURE: CPT | Mod: ZL | Performed by: FAMILY MEDICINE

## 2024-05-13 PROCEDURE — 83036 HEMOGLOBIN GLYCOSYLATED A1C: CPT | Mod: ZL | Performed by: FAMILY MEDICINE

## 2024-05-13 PROCEDURE — G0463 HOSPITAL OUTPT CLINIC VISIT: HCPCS

## 2024-05-13 PROCEDURE — 84295 ASSAY OF SERUM SODIUM: CPT | Mod: ZL | Performed by: FAMILY MEDICINE

## 2024-05-13 PROCEDURE — 73660 X-RAY EXAM OF TOE(S): CPT | Mod: TC,RT

## 2024-05-13 RX ORDER — PREGABALIN 200 MG/1
200 CAPSULE ORAL 3 TIMES DAILY
Qty: 90 CAPSULE | Refills: 1 | Status: SHIPPED | OUTPATIENT
Start: 2024-05-13 | End: 2024-05-13

## 2024-05-13 RX ORDER — PREGABALIN 150 MG/1
150 CAPSULE ORAL 3 TIMES DAILY
Qty: 270 CAPSULE | Refills: 1 | Status: CANCELLED | OUTPATIENT
Start: 2024-05-13

## 2024-05-13 RX ORDER — PREGABALIN 200 MG/1
200 CAPSULE ORAL 3 TIMES DAILY
Qty: 270 CAPSULE | Refills: 1 | Status: SHIPPED | OUTPATIENT
Start: 2024-05-13 | End: 2024-08-14

## 2024-05-13 ASSESSMENT — PAIN SCALES - GENERAL: PAINLEVEL: MODERATE PAIN (4)

## 2024-05-13 ASSESSMENT — PAIN SCALES - PAIN ENJOYMENT GENERAL ACTIVITY SCALE (PEG)
INTERFERED_ENJOYMENT_LIFE: 0
PEG_TOTALSCORE: 1.33
INTERFERED_GENERAL_ACTIVITY: 0
INTERFERED_ENJOYMENT_LIFE: 0 - DOES NOT INTERFERE
INTERFERED_GENERAL_ACTIVITY: 0 - DOES NOT INTERFERE
AVG_PAIN_PASTWEEK: 4

## 2024-05-13 NOTE — TELEPHONE ENCOUNTER
Spoke with patient, he stated that he normally gets a 3 months supply at a time of the Pregabalin and was wondering if it could be changed, he stated he only got a 2 month supply, or if this is related to insurance.

## 2024-05-13 NOTE — PATIENT INSTRUCTIONS
Lab today - A1c and metabolic panel - will notify of results.    Xray right great toe/foot today - evaluate for arthritis.    MRI lumbar spine ordered - radiology will call to schedule.    Trial of increasing Heather from 150 mg to 200 mg 3 times per day.  Monitor for sedation, other side effects.

## 2024-05-13 NOTE — PROGRESS NOTES
Assessment & Plan     Chronic pain syndrome  UDS up to date and contract.  Continue Lyrica - but increase to 200 mg TID - given frequent neuropathic pain.  Update MRI lumbar spine.  - MR Lumbar Spine w/o & w Contrast; Future  - Pregabalin (LYRICA) 200 MG capsule; Take 1 capsule (200 mg) by mouth 3 times daily    DDD (degenerative disc disease), lumbar  As above.  - MR Lumbar Spine w/o & w Contrast; Future  - Pregabalin (LYRICA) 200 MG capsule; Take 1 capsule (200 mg) by mouth 3 times daily    Lumbar radiculopathy  As above - right sided to foot.  - MR Lumbar Spine w/o & w Contrast; Future  - Pregabalin (LYRICA) 200 MG capsule; Take 1 capsule (200 mg) by mouth 3 times daily    Neuropathy  As above.    Pain of toe of right foot  Is it radicular from spine?  Or arthritis?    Xray obtained - read pending.  Unlikely gout history.  - XR Toe Right G/E 2 Views; Future    Essential hypertension  Stable.  At goal    Hyperlipidemia, unspecified hyperlipidemia type  Stable. Annual labs due in the fall.    Prediabetes  Updating A1c.  - Hemoglobin A1c  - Basic metabolic panel    Stage 3a chronic kidney disease (H)  Updating BMP.  Avoid NSAIDs.  - Hemoglobin A1c  - Basic metabolic panel        BMI  Estimated body mass index is 32.92 kg/m  as calculated from the following:    Height as of this encounter: 1.829 m (6').    Weight as of this encounter: 110.1 kg (242 lb 11.2 oz).   Weight management plan: Discussed healthy diet and exercise guidelines      See Patient Instructions    Return in about 3 months (around 8/13/2024) for chronic pain.    Rosalio Meehan is a 84 year old, presenting for the following health issues:  Back Pain, Lipids, and Hypertension      HPI     11th great grandbaby - baby girl - today.  Will be 60th year anniversary in 1/2025.    Right great toe - painful at times; but no swelling, rash, redness, bruising.  Dull pain.      Chronic/Recurring Back Pain Follow Up  Where is your back pain located? (Select  all that apply) low back bilateral  How would you describe your back pain?  Burning/sciatica problem   Where does your back pain spread? the right buttock, the right  thigh, the right  knee, the right foot, the right shoulder, and the right and left neck  Since your last clinic visit for back pain, how has your pain changed? always present, but gets better and worse  Does your back pain interfere with your job? Not applicable  Since your last visit, have you tried any new treatment? No  PDMP reviewed  Lyrica stable  Contract in place  UDS 11/2023 as expected  More sciatica - right leg -   Last MRI 2020  No weakness or numbness  Lightening bolt down the leg all the way to the foot; several times per day  Last surgery 2021 - Dr Brown - [facility] spine    Hyperlipidemia Follow-Up and prediabetes - fasting labs due 11/2023  Are you regularly taking any medication or supplement to lower your cholesterol?   Yes- lipitor   Are you having muscle aches or other side effects that you think could be caused by your cholesterol lowering medication?  No    Hypertension Follow-up  Do you check your blood pressure regularly outside of the clinic? Yes   Are you following a low salt diet? No  Are your blood pressures ever more than 140 on the top number (systolic) OR more   than 90 on the bottom number (diastolic), for example 140/90? No    Chronic Kidney Disease Follow-up  Do you take any over the counter pain medicine?: Yes  What over the counter medicine are you taking for your pain?:  Tylenol    How often do you take this medicine?:   as needed      Review of Systems  Constitutional, HEENT, cardiovascular, pulmonary, gi and gu systems are negative, except as otherwise noted.      Objective    /60 (BP Location: Right arm, Patient Position: Sitting, Cuff Size: Adult Regular)   Pulse 62   Temp 97.1  F (36.2  C) (Tympanic)   Ht 1.829 m (6')   Wt 110.1 kg (242 lb 11.2 oz)   SpO2 96%   BMI 32.92 kg/m    Body mass index  is 32.92 kg/m .  Physical Exam   GENERAL: alert and no distress  NECK: no adenopathy, no asymmetry, masses, or scars  RESP: lungs clear to auscultation - no rales, rhonchi or wheezes  CV: regular rate and rhythm, normal S1 S2, no S3 or S4, no murmur, click or rub, no peripheral edema  ABDOMEN: soft, nontender, no hepatosplenomegaly, no masses and bowel sounds normal  MS: normal muscle tone, trace edema to bilateral ankles, and tenderness to palpation right paralumbar spine; no midline spine tenderness to palpation; SLR negative bilaterally; symmetric strength and reflexes; right foot - great toe without swelling or skin change; no bunion; mildly tender to palpation 1st MTP joint  NEURO: Normal strength and tone, mentation intact and speech normal  PSYCH: mentation appears normal, affect normal/bright    Labs pending.  Xray right toe pending        Signed Electronically by: Evelyn Iniguez MD

## 2024-05-13 NOTE — TELEPHONE ENCOUNTER
----- Message from Evelyn Olson MD sent at 5/13/2024 11:06 AM CDT -----  Notify of results - big toe appears normal.  3rd toe has spot - possibly old injury - but not site of symptoms.

## 2024-05-20 ENCOUNTER — HOSPITAL ENCOUNTER (OUTPATIENT)
Dept: MRI IMAGING | Facility: HOSPITAL | Age: 84
Discharge: HOME | End: 2024-05-20
Attending: FAMILY MEDICINE | Admitting: FAMILY MEDICINE
Payer: COMMERCIAL

## 2024-05-20 DIAGNOSIS — M48.061 SPINAL STENOSIS OF LUMBAR REGION, UNSPECIFIED WHETHER NEUROGENIC CLAUDICATION PRESENT: ICD-10-CM

## 2024-05-20 DIAGNOSIS — M54.16 LUMBAR RADICULOPATHY: ICD-10-CM

## 2024-05-20 DIAGNOSIS — G89.4 CHRONIC PAIN SYNDROME: ICD-10-CM

## 2024-05-20 DIAGNOSIS — M51.369 DDD (DEGENERATIVE DISC DISEASE), LUMBAR: Primary | ICD-10-CM

## 2024-05-20 DIAGNOSIS — M51.369 DDD (DEGENERATIVE DISC DISEASE), LUMBAR: ICD-10-CM

## 2024-05-20 PROCEDURE — 72148 MRI LUMBAR SPINE W/O DYE: CPT

## 2024-06-06 ENCOUNTER — TELEPHONE (OUTPATIENT)
Dept: INTERVENTIONAL RADIOLOGY/VASCULAR | Facility: HOSPITAL | Age: 84
End: 2024-06-06

## 2024-06-06 NOTE — TELEPHONE ENCOUNTER
Called patient to remind them of their appt on 6/10. Also reminded patient to not take any antibiotics, steroids, or immunizations two weeks before and after this appt.  And they also need a .     JOVANI DEL ROSARIO

## 2024-06-10 ENCOUNTER — HOSPITAL ENCOUNTER (OUTPATIENT)
Facility: HOSPITAL | Age: 84
Discharge: HOME | End: 2024-06-10
Attending: RADIOLOGY | Admitting: RADIOLOGY
Payer: COMMERCIAL

## 2024-06-10 ENCOUNTER — HOSPITAL ENCOUNTER (OUTPATIENT)
Dept: GENERAL RADIOLOGY | Facility: HOSPITAL | Age: 84
Discharge: HOME | End: 2024-06-10
Attending: FAMILY MEDICINE
Payer: COMMERCIAL

## 2024-06-10 DIAGNOSIS — M48.061 SPINAL STENOSIS OF LUMBAR REGION, UNSPECIFIED WHETHER NEUROGENIC CLAUDICATION PRESENT: ICD-10-CM

## 2024-06-10 DIAGNOSIS — M54.16 LUMBAR RADICULOPATHY: ICD-10-CM

## 2024-06-10 DIAGNOSIS — M51.369 DDD (DEGENERATIVE DISC DISEASE), LUMBAR: ICD-10-CM

## 2024-06-10 PROCEDURE — 250N000011 HC RX IP 250 OP 636: Mod: JZ | Performed by: RADIOLOGY

## 2024-06-10 PROCEDURE — 64483 NJX AA&/STRD TFRM EPI L/S 1: CPT | Mod: RT

## 2024-06-10 RX ORDER — DEXAMETHASONE SODIUM PHOSPHATE 10 MG/ML
10 INJECTION, SOLUTION INTRAMUSCULAR; INTRAVENOUS ONCE
Status: COMPLETED | OUTPATIENT
Start: 2024-06-10 | End: 2024-06-10

## 2024-06-10 RX ORDER — IOPAMIDOL 612 MG/ML
15 INJECTION, SOLUTION INTRATHECAL ONCE
Status: COMPLETED | OUTPATIENT
Start: 2024-06-10 | End: 2024-06-10

## 2024-06-10 RX ADMIN — IOPAMIDOL 3 ML: 612 INJECTION, SOLUTION INTRATHECAL at 13:55

## 2024-06-10 RX ADMIN — DEXAMETHASONE SODIUM PHOSPHATE 10 MG: 10 INJECTION, SOLUTION INTRAMUSCULAR; INTRAVENOUS at 13:55

## 2024-06-10 NOTE — DISCHARGE INSTRUCTIONS
Cell number on file:    Telephone Information:   Mobile [phone]     Is it ok to leave a message at this number(s)? Yes    DR FLANNERY completed your procedure on 6/10/2024.    Current Pain Level (0-10 Scale): 6/10  Post Pain Level (0-10):  0/10    Radiology Discharge instructions for Steroid Injection    Activity Level:     Do not do any heavy activity or exercise for 24 hours.   Do not drive for 4 hours after your injection.  Diet:   Return to your normal diet.  Medications:   If you have stopped taking your Aspirin, Coumadin/Warfarin, Ibuprofen, or any   other blood thinner for this procedure you may resume in the morning unless   your primary care provider has given you other instructions.    Diabetics may see an increase in blood sugar after steroid injections. If you are concerned about your blood sugar, please contact your family doctor.    Site Care:  Remove the bandage and bathe or shower the morning after the procedure.      This is a Pain Management procedure.  You will be contacted in two weeks for follow up.    Call your Primary Care Provider if you have the following (if your primary care provider is not available please seek emergency care):   Nausea with vomiting   Severe headache   Drowsiness or confusion   Redness or drainage at the injection or puncture site   Temperature over 101 degrees F   Other concerns   Worsening back pain   Stiff neck

## 2024-06-10 NOTE — PROGRESS NOTES
"After injection pt c/o right leg feeling numb and difficulty putting wt on right leg. At 1415 pt was able to stand and put wt on right leg. Stated foot still felt numb. Pt able to walk a few steps with standby assistance but continued to state \"it doesn't feel like normal.\" Pt states he has a few deck steps to walk up and that the deck has a hand rail. Pt instructed to hold on to hand rail with a good  today when at home. Pt agreed to stay for another 15 minutes for another leg /foot/mobility check. 1430 pt able to stand and walk unassisted. Pt stated leg and foot do not feel numb anymore. Pt assisted out to vehicle by Mary Jo arguelles. Wife was with pt during checks and also listened to instructions.  "

## 2024-06-11 DIAGNOSIS — R60.0 EDEMA, LOWER EXTREMITY: ICD-10-CM

## 2024-06-11 DIAGNOSIS — I10 ESSENTIAL HYPERTENSION: ICD-10-CM

## 2024-06-11 RX ORDER — AMLODIPINE BESYLATE 10 MG/1
10 TABLET ORAL DAILY
Qty: 90 TABLET | Refills: 0 | Status: SHIPPED | OUTPATIENT
Start: 2024-06-11 | End: 2024-08-14

## 2024-06-11 RX ORDER — FUROSEMIDE 20 MG
20 TABLET ORAL DAILY
Qty: 90 TABLET | Refills: 0 | Status: SHIPPED | OUTPATIENT
Start: 2024-06-11 | End: 2024-08-14

## 2024-06-11 NOTE — TELEPHONE ENCOUNTER
Amlodipine 10 mg       Last Written Prescription Date:  3/12/24  Last Fill Quantity: 90,   # refills: 0  Last Office Visit: 5/13/24  Future Office visit:    Next 5 appointments (look out 90 days)      Aug 14, 2024  9:15 AM  (Arrive by 9:00 AM)  Provider Visit with Evelyn Olson MD  [facility] ([facility] ) [address]  [facility] [address]  [phone]             Routing refill request to provider for review/approval because:  Calcium Channel Blockers Protocol  Failed      GFR is on file in the past 12 months and most recent GFR is normal        Lasix 20 mg       Last Written Prescription Date:  3/20/24  Last Fill Quantity: 90,   # refills: 0  Last Office Visit: 5/13/24  Future Office visit:    Next 5 appointments (look out 90 days)      Aug 14, 2024  9:15 AM  (Arrive by 9:00 AM)  Provider Visit with Evelyn [facility] MD  [facility] ([facility] ) [address]  [place] [address]  [phone]             Routing refill request to provider for review/approval because:  Diuretics (Including Combos) Protocol Failed      Has GFR on file in past 12 months and most recent value is normal

## 2024-06-17 PROBLEM — Z71.89 ADVANCED DIRECTIVES, COUNSELING/DISCUSSION: Status: RESOLVED | Noted: 2017-08-22 | Resolved: 2024-06-17

## 2024-06-24 ENCOUNTER — TELEPHONE (OUTPATIENT)
Dept: INTERVENTIONAL RADIOLOGY/VASCULAR | Facility: HOSPITAL | Age: 84
End: 2024-06-24

## 2024-06-24 NOTE — TELEPHONE ENCOUNTER
INJECTION POST CALL    Was this an IR Referral? YES    Procedure: Epidural TF RT S1  Radiologist(s): DR. BRAD FLANNERY  Date of Procedure: 6/10/24    The patient had no questions or concerns.    Relief of pain from this injection    A = 90%  A- = 85%  B = 80%  B- =75%  C = 70%  C- = 65%  D = 60%  D- = 50%  F = less than 50%       Did you get any relief from this injection in the past 2 weeks? Yes, patient is feeling 80% of relief.     If yes, how long did the relief last? Patient is still feeling this relief at the two week f/up    Are you still feeling relief? (2 weeks out) Yes  Would you say this injection has been beneficial? Yes      Was there one injection that worked better than the other? Patient states this injection was lamar then the last one he had in 2020    Where is the pain? He will feel some pain in the left an right toes, specifically the big toes, and the two next to it.  Can you describe the pain? Numbness, and tingling   Does the pain radiate anywhere? No   If yes, where does it radiate and where does the pain stop? N/A    Is this new? No    Patient would not like to pursue another injection at this time.    Instruct patient to follow up with their provider for any further care they may need.    Katey Bustamante

## 2024-07-15 DIAGNOSIS — I10 ESSENTIAL HYPERTENSION: ICD-10-CM

## 2024-07-16 RX ORDER — LOSARTAN POTASSIUM 50 MG/1
50 TABLET ORAL DAILY
Qty: 90 TABLET | Refills: 3 | Status: SHIPPED | OUTPATIENT
Start: 2024-07-16

## 2024-07-16 NOTE — TELEPHONE ENCOUNTER
Failed protocol    GFR Estimate   Date Value Ref Range Status   05/13/2024 56 (L) >60 mL/min/1.73m2 Final   01/13/2021 80 >60 mL/min/[1.73_m2] Final     Comment:     Non  GFR Calc  Starting 12/18/2018, serum creatinine based estimated GFR (eGFR) will be   calculated using the Chronic Kidney Disease Epidemiology Collaboration   (CKD-EPI) equation.          losartan (COZAAR) 50 MG tablet       Last Written Prescription Date:  4/18/24  Last Fill Quantity: 90,   # refills: 0  Last Office Visit: 5/13/24  Future Office visit:    Next 5 appointments (look out 90 days)      Aug 14, 2024 9:15 AM  (Arrive by 9:00 AM)  Provider Visit with Evelyn Olson MD  [facility] ([facility] ) [address] [place] [address]  [phone]             Routing refill request to provider for review/approval because:

## 2024-08-08 NOTE — PROGRESS NOTES
{PROVIDER CHARTING PREFERENCE:653897}    Rosalio Meehan is a 84 year old, presenting for the following health issues:  No chief complaint on file.  {(!) Visit Details have not yet been documented.  Please enter Visit Details and then use this list to pull in documentation. (Optional):927679}  HPI     Hyperlipidemia Follow-Up    Are you regularly taking any medication or supplement to lower your cholesterol?   { :869977}  Are you having muscle aches or other side effects that you think could be caused by your cholesterol lowering medication?  { :949138}    Hypertension Follow-up    Do you check your blood pressure regularly outside of the clinic? { :463013}   Are you following a low salt diet? { :815672}  Are your blood pressures ever more than 140 on the top number (systolic) OR more   than 90 on the bottom number (diastolic), for example 140/90? { :941162}    Depression and Anxiety   How are you doing with your depression since your last visit? { :187337}  How are you doing with your anxiety since your last visit?  { :053921}  Are you having other symptoms that might be associated with depression or anxiety? { :594658}  Have you had a significant life event? { :156714}   Do you have any concerns with your use of alcohol or other drugs? { :873445}    Social History     Tobacco Use    Smoking status: Former     Current packs/day: 0.00     Types: Cigarettes     Quit date: 1992     Years since quittin.6     Passive exposure: Never    Smokeless tobacco: Never   Vaping Use    Vaping status: Never Used   Substance Use Topics    Alcohol use: Never    Drug use: No         9/3/2021    11:00 AM 10/27/2022     9:00 AM 2023     8:49 AM   PHQ   PHQ-9 Total Score 5 0 4   Q9: Thoughts of better off dead/self-harm past 2 weeks Not at all Not at all Not at all         2023     9:34 AM 2023     9:27 AM 2023     8:51 AM   ROBYN-7 SCORE   Total Score   0 (minimal anxiety)   Total Score 0 0 0     {Last  PHQ9 or GAD7 Responses (Optional):983870}    Suicide Assessment Five-step Evaluation and Treatment (SAFE-T)  {Provider  Link to Depression Care Package SmartSet :022812}  Pain History:  When did you first notice your pain? {Duration of pain :849562}    {additonal problems for provider to add (Optional):993440}    {ROS Picklists (Optional):525993}      Objective    There were no vitals taken for this visit.  There is no height or weight on file to calculate BMI.  Physical Exam   {Exam List (Optional):599092}    {Diagnostic Test Results (Optional):369314}        Signed Electronically by: Evelyn Iniguez MD  {Email feedback regarding this note to [email or web address]   :859780}

## 2024-08-08 NOTE — PROGRESS NOTES
Assessment & Plan     Chronic pain syndrome  Stable overall.  Increased dose of Lyrica not helpful - reduce back to 150 mg TID.  Continue Tylenol  Limit NSAIDs due to CKD.  Injection to back was helpful - for 1-2 months.  Consider repeat and consult with radiology.  Patient defers at this time.  - Pregabalin (LYRICA) 150 MG capsule; Take 1 capsule (150 mg) by mouth 3 times daily    DDD (degenerative disc disease), lumbar  As above.  - Pregabalin (LYRICA) 150 MG capsule; Take 1 capsule (150 mg) by mouth 3 times daily    Lumbar radiculopathy  As above.  Right sided.  - Pregabalin (LYRICA) 150 MG capsule; Take 1 capsule (150 mg) by mouth 3 times daily    Edema, lower extremity  Stable with daily Lasix.  Encouraged elevation, compression, limiting salt.  - furosemide (LASIX) 20 MG tablet; Take 1 tablet (20 mg) by mouth daily    Essential hypertension  At goal.  Continue Norvasc 10 mg.  - amLODIPine (NORVASC) 10 MG tablet; Take 1 tablet (10 mg) by mouth daily  - furosemide (LASIX) 20 MG tablet; Take 1 tablet (20 mg) by mouth daily  - CBC with platelets and differential; Future  - Comprehensive metabolic panel (BMP + Alb, Alk Phos, ALT, AST, Total. Bili, TP); Future    Hyperlipidemia, unspecified hyperlipidemia type  Stable.  Fasting labs due 11/2024.  Continue Lipitor 80 mg.  - atorvastatin (LIPITOR) 80 MG tablet; Take 1 tablet (80 mg) by mouth daily  - Lipid Profile (Chol, Trig, HDL, LDL calc); Future  - Comprehensive metabolic panel (BMP + Alb, Alk Phos, ALT, AST, Total. Bili, TP); Future    Dysfunction of right eustachian tube  Right PE tube in place.  Consider ENT follow up due to pain.  Patient defers at this time.  Continue ear plugs when water exposure.    Prediabetes  Future lab ordered.  - Hemoglobin A1c; Future    Stage 3a chronic kidney disease (H)  Stable.  Avoid NSAIDs if possible.  - CBC with platelets and differential; Future  - Comprehensive metabolic panel (BMP + Alb, Alk Phos, ALT, AST, Total. Bili,  TP); Future    Right ear pain  As above.        See Patient Instructions    Return in about 3 months (around 2024) for CDM and fasting labs.    The longitudinal plan of care for the diagnosis(es)/condition(s) as documented were addressed during this visit. Due to the added complexity in care, I will continue to support Zoran in the subsequent management and with ongoing continuity of care.  Rosalio Meehan is a 84 year old, presenting for the following health issues:  Hypertension, Depression, Anxiety, and chronic pain         2024     8:51 AM   Additional Questions   Roomed by herminio pyle   Accompanied by self         2024     8:51 AM   Patient Reported Additional Medications   Patient reports taking the following new medications none     HPI     Right ear - PE tube in place.  Painful if water touches it. Wears ear plugs.  No drainage.    Hypertension Follow-up  Do you check your blood pressure regularly outside of the clinic? No   Are you following a low salt diet? No  Are your blood pressures ever more than 140 on the top number (systolic) OR more   than 90 on the bottom number (diastolic), for example 140/90? No  Home readings 130s/60s.  No symptoms.  Lasix for swelling.    Depression and Anxiety   How are you doing with your depression since your last visit? Improved   How are you doing with your anxiety since your last visit?  Improved   Are you having other symptoms that might be associated with depression or anxiety? No  Have you had a significant life event? No   Do you have any concerns with your use of alcohol or other drugs? No    Social History     Tobacco Use    Smoking status: Former     Current packs/day: 0.00     Types: Cigarettes     Quit date: 1992     Years since quittin.6     Passive exposure: Never    Smokeless tobacco: Never   Vaping Use    Vaping status: Never Used   Substance Use Topics    Alcohol use: Never    Drug use: No         9/3/2021    11:00 AM 10/27/2022      9:00 AM 11/9/2023     8:49 AM   PHQ   PHQ-9 Total Score 5 0 4   Q9: Thoughts of better off dead/self-harm past 2 weeks Not at all Not at all Not at all         1/30/2023     9:34 AM 5/8/2023     9:27 AM 11/9/2023     8:51 AM   ROBYN-7 SCORE   Total Score   0 (minimal anxiety)   Total Score 0 0 0         11/9/2023     8:49 AM   Last PHQ-9   1.  Little interest or pleasure in doing things 0   2.  Feeling down, depressed, or hopeless 0   3.  Trouble falling or staying asleep, or sleeping too much 1   4.  Feeling tired or having little energy 1   5.  Poor appetite or overeating 1   6.  Feeling bad about yourself 0   7.  Trouble concentrating 1   8.  Moving slowly or restless 0   Q9: Thoughts of better off dead/self-harm past 2 weeks 0   PHQ-9 Total Score 4         11/9/2023     8:51 AM   ROBYN-7    1. Feeling nervous, anxious, or on edge 0   2. Not being able to stop or control worrying 0   3. Worrying too much about different things 0   4. Trouble relaxing 0   5. Being so restless that it is hard to sit still 0   6. Becoming easily annoyed or irritable 0   7. Feeling afraid, as if something awful might happen 0   ROBYN-7 Total Score 0   If you checked any problems, how difficult have they made it for you to do your work, take care of things at home, or get along with other people? Not difficult at all       Suicide Assessment Five-step Evaluation and Treatment (SAFE-T)    Pain History:  When did you first notice your pain? years   Have you seen this provider for your pain in the past? Yes   Where in your body do you have pain? Lowback right side; his toes hurts when he walks   Are you seeing anyone else for your pain? No  Xray right toe 5/2024 - negative for acute pain; pain is across toes and pad of foot; nerve pain?    MRI lumbar spine - mod-severe spinal stenosis L2/3.  Injection done 6/10/24 - right S1 transforaminal - painful injection; short term relief - helpful for 1-2 months.    Lyrica increased from 150 to 200  mg TID 5/2024 - no improvement - will go back down; no side effects.  Pain is across low back pain, down right leg.  Tylenol at bedtime as well as Ibuprofen.  Not taken during the day.  Defers additional injection at this time.        9/3/2021    11:00 AM 10/27/2022     9:00 AM 11/9/2023     8:49 AM   PHQ-9 SCORE   PHQ-9 Total Score MyChart   4 (Minimal depression)   PHQ-9 Total Score 5 0 4           1/30/2023     9:34 AM 5/8/2023     9:27 AM 11/9/2023     8:51 AM   ROBYN-7 SCORE   Total Score   0 (minimal anxiety)   Total Score 0 0 0           Chronic Pain Follow Up:  Location of pain: back and right leg and right foot  Analgesia/pain control:    - Recent changes:  none    - Overall control: Tolerable with discomfort    - Current treatments: Lyrica, tylenol, limited ibuprofen   Adherence:     - Do you ever take more pain medicine than prescribed? No    - When did you take your last dose of pain medicine?  today   Adverse effects: No   PDMP Review         Value Time User    State PDMP site checked  Yes 8/14/2024  9:15 AM Evelyn Olson MD          Last CSA Agreement:   CSA -- Patient Level:     [Media Unavailable] Controlled Substance Agreement - Opioid - Scan on 2/12/2024  2:18 PM: OPIOID/OPIOID PLUS CONTROLLED SUBSTANCE AGREEMENT   [Media Unavailable] Controlled Substance Agreement - Opioid - Scan on 2/14/2023  1:22 PM: OPIOID/OPIOID PLUS CONTROLLED SUBSTANCE AGREEMENT   [Media Unavailable] Controlled Substance Agreement - Opioid - Scan on 10/29/2021  9:35 AM: OPIOD/OPIOD PLUS CONTROLLED SUBSTANCE AGREEMENT   [Media Unavailable] Controlled Substance Agreement - Opioid - Scan on 10/30/2020 10:59 AM: OPIOD/OPIOD PLUS CONTROLLED SUBSTANCE AGREEMENT   [Media Unavailable] Controlled Substance Agreement - Opioid - Scan on 7/26/2019 12:01 PM: OPIOD/OPIOD PLUS CONTROLLED SUBSTANCE AGREEMENT       Last UDS: 11/13/2023      Review of Systems  Constitutional, HEENT, cardiovascular, pulmonary, gi and gu systems are  negative, except as otherwise noted.      Objective    /62 (BP Location: Right arm, Patient Position: Sitting, Cuff Size: Adult Regular)   Pulse 75   Temp 97.7  F (36.5  C) (Tympanic)   Ht 1.829 m (6')   Wt 110.6 kg (243 lb 12.8 oz)   SpO2 95%   BMI 33.07 kg/m    Body mass index is 33.07 kg/m .  Physical Exam   GENERAL: alert, no distress, and elderly  EYES: Eyes grossly normal to inspection, PERRL and conjunctivae and sclerae normal  HENT: normal cephalic/atraumatic, right ear: PE tube in place, small amount of wax, left ear: normal: no effusions, no erythema, normal landmarks, nose and mouth without ulcers or lesions, oropharynx clear, and oral mucous membranes moist  NECK: no adenopathy, no asymmetry, masses, or scars  RESP: lungs clear to auscultation - no rales, rhonchi or wheezes  CV: regular rate and rhythm, normal S1 S2, no S3 or S4, no murmur, click or rub, no peripheral edema  MS: normal muscle tone, 1+ edema to bilateral ankles, lower legs, peripheral pulses normal, and tenderness to palpation lumbar spine region; negative SLR bilaterally; symmetric strength, sensation, reflexes  SKIN: no suspicious lesions or rashes  NEURO: Normal strength and tone, mentation intact and speech normal  PSYCH: mentation appears normal, affect normal/bright          Signed Electronically by: Evelyn Iniguez MD

## 2024-08-14 ENCOUNTER — OFFICE VISIT (OUTPATIENT)
Dept: FAMILY MEDICINE | Facility: OTHER | Age: 84
End: 2024-08-14
Attending: FAMILY MEDICINE
Payer: COMMERCIAL

## 2024-08-14 VITALS
TEMPERATURE: 97.7 F | BODY MASS INDEX: 33.02 KG/M2 | HEIGHT: 72 IN | HEART RATE: 75 BPM | WEIGHT: 243.8 LBS | DIASTOLIC BLOOD PRESSURE: 62 MMHG | SYSTOLIC BLOOD PRESSURE: 130 MMHG | OXYGEN SATURATION: 95 %

## 2024-08-14 DIAGNOSIS — E78.5 HYPERLIPIDEMIA, UNSPECIFIED HYPERLIPIDEMIA TYPE: ICD-10-CM

## 2024-08-14 DIAGNOSIS — M54.16 LUMBAR RADICULOPATHY: ICD-10-CM

## 2024-08-14 DIAGNOSIS — M51.369 DDD (DEGENERATIVE DISC DISEASE), LUMBAR: ICD-10-CM

## 2024-08-14 DIAGNOSIS — I10 ESSENTIAL HYPERTENSION: ICD-10-CM

## 2024-08-14 DIAGNOSIS — H92.01 RIGHT EAR PAIN: ICD-10-CM

## 2024-08-14 DIAGNOSIS — N18.31 STAGE 3A CHRONIC KIDNEY DISEASE (H): ICD-10-CM

## 2024-08-14 DIAGNOSIS — R73.03 PREDIABETES: ICD-10-CM

## 2024-08-14 DIAGNOSIS — G89.4 CHRONIC PAIN SYNDROME: Primary | ICD-10-CM

## 2024-08-14 DIAGNOSIS — R60.0 EDEMA, LOWER EXTREMITY: ICD-10-CM

## 2024-08-14 DIAGNOSIS — H69.91 DYSFUNCTION OF RIGHT EUSTACHIAN TUBE: ICD-10-CM

## 2024-08-14 PROCEDURE — G0463 HOSPITAL OUTPT CLINIC VISIT: HCPCS

## 2024-08-14 PROCEDURE — G2211 COMPLEX E/M VISIT ADD ON: HCPCS | Performed by: FAMILY MEDICINE

## 2024-08-14 PROCEDURE — 99214 OFFICE O/P EST MOD 30 MIN: CPT | Performed by: FAMILY MEDICINE

## 2024-08-14 RX ORDER — ATORVASTATIN CALCIUM 80 MG/1
80 TABLET, FILM COATED ORAL DAILY
Qty: 90 TABLET | Refills: 1 | Status: SHIPPED | OUTPATIENT
Start: 2024-08-14

## 2024-08-14 RX ORDER — FUROSEMIDE 20 MG
20 TABLET ORAL DAILY
Qty: 90 TABLET | Refills: 1 | Status: SHIPPED | OUTPATIENT
Start: 2024-08-14

## 2024-08-14 RX ORDER — PREGABALIN 150 MG/1
150 CAPSULE ORAL 3 TIMES DAILY
Qty: 270 CAPSULE | Refills: 1 | Status: SHIPPED | OUTPATIENT
Start: 2024-08-14

## 2024-08-14 RX ORDER — AMLODIPINE BESYLATE 10 MG/1
10 TABLET ORAL DAILY
Qty: 90 TABLET | Refills: 1 | Status: SHIPPED | OUTPATIENT
Start: 2024-08-14

## 2024-08-14 RX ORDER — PREGABALIN 200 MG/1
200 CAPSULE ORAL 3 TIMES DAILY
Qty: 270 CAPSULE | Refills: 1 | Status: CANCELLED | OUTPATIENT
Start: 2024-08-14

## 2024-08-14 ASSESSMENT — PAIN SCALES - GENERAL: PAINLEVEL: MODERATE PAIN (5)

## 2024-08-14 NOTE — PATIENT INSTRUCTIONS
Refills sent.  Reduce Lyrica back to 150 mg dose.  3 times per day.  Come fasting for labs in 3 months.  Follow up with ENT if needed for right ear.  Consider repeat back injections and consult with radiologist.

## 2024-11-08 ENCOUNTER — TELEPHONE (OUTPATIENT)
Dept: OTOLARYNGOLOGY | Facility: OTHER | Age: 84
End: 2024-11-08

## 2024-11-08 NOTE — TELEPHONE ENCOUNTER
Patient's spouse reports he has had bleeding from his right ear. Wakes up with a drop of blood on his pillow in the morning for the last few days. Patient is currently using ear drops. Tube placed in his right ear on 12/14/2023. Patient denies pain. No providers in today, and no available appointments next week. Will see if patient can be overbooked. In the meantime, patient will complete 7 days of ear drops per prescription and will follow-up in the UC or ED if symptoms worsen or pain occurs. Patient's spouse verbalizes understanding.

## 2024-11-13 NOTE — PROGRESS NOTES
Assessment & Plan     Hyperlipidemia, unspecified hyperlipidemia type  Stable.  LDL at goal.  Continue statin.    Elevated fasting glucose  Stable prediabetes at at this point  Recheck annually.    Essential hypertension  At goal.    Atherosclerosis of native coronary artery of native heart without angina pectoris  Risk factor management - stable.  No exertional symptoms.    Stage 3a chronic kidney disease (H)  Stable renal function.  Avoid/limit NSAIDs.    Obesity (BMI 30.0-34.9)  Stable weight.    Sleep apnea, unspecified type    Chronic pain syndrome  Unclear benefit of pain medications over time.  Was on Percocet for a while - then off.  Lyrica on - then off - then back on.  Prior Neurontin - side effects.  Has tried topical agents, prior back surgery, recent GINA not helpful.    Multilevel degenerative changes.  Limited activity - worse with car rides such as 30 min to [place].  Unable to walk as much.  Revisiting opiate options - trial of Oxycodone BID 5 mg Prn and reassess.  Discussed goal of improving function.  Follow up 3 months to reassess.  - Drug Confirmation Panel Urine with Creat; Future  - oxyCODONE (ROXICODONE) 5 MG tablet; Take 1 tablet (5 mg) by mouth 2 times daily as needed for pain.    Degeneration of intervertebral disc of lumbar region with discogenic back pain  As above  - oxyCODONE (ROXICODONE) 5 MG tablet; Take 1 tablet (5 mg) by mouth 2 times daily as needed for pain.          BMI  Estimated body mass index is 32.55 kg/m  as calculated from the following:    Height as of this encounter: 1.829 m (6').    Weight as of this encounter: 108.9 kg (240 lb).   Weight management plan: Discussed healthy diet and exercise guidelines      See Patient Instructions    Return in about 3 months (around 2/18/2025) for chronic pain.    The longitudinal plan of care for the diagnosis(es)/condition(s) as documented were addressed during this visit. Due to the added complexity in care, I will continue to  support Zoran in the subsequent management and with ongoing continuity of care.  Rosalio Meehan is a 84 year old, presenting for the following health issues:  Lipids, Hypertension, Kidney Problem, Depression, Anxiety, and chronic pain         2024     9:36 AM   Additional Questions   Roomed by herminio pyle   Accompanied by self         2024     9:36 AM   Patient Reported Additional Medications   Patient reports taking the following new medications none     HPI     Labs first - fasting - blood and urine - pending    Right ear - had some bleeding/infection - saw ENT - did drops; had PE tubes.  Better.      Hyperlipidemia Follow-Up  Are you regularly taking any medication or supplement to lower your cholesterol?   Yes- lipitor 80mg  Are you having muscle aches or other side effects that you think could be caused by your cholesterol lowering medication?  No    Hypertension Follow-up  Do you check your blood pressure regularly outside of the clinic? Sometimes    Are you following a low salt diet? No  Are your blood pressures ever more than 140 on the top number (systolic) OR more   than 90 on the bottom number (diastolic), for example 140/90? N/A    Chronic Kidney Disease Follow-up  Do you take any over the counter pain medicine?: Yes  What over the counter medicine are you taking for your pain?:  tylenol 325mg TID    How often do you take this medicine?:  Three times daily    Depression and Anxiety   How are you doing with your depression since your last visit? Improved   How are you doing with your anxiety since your last visit?  Improved   Are you having other symptoms that might be associated with depression or anxiety? No  Have you had a significant life event? No   Do you have any concerns with your use of alcohol or other drugs? No    Social History     Tobacco Use    Smoking status: Former     Current packs/day: 0.00     Types: Cigarettes     Quit date: 1992     Years since quittin.9      Passive exposure: Never    Smokeless tobacco: Never   Vaping Use    Vaping status: Never Used   Substance Use Topics    Alcohol use: Never    Drug use: No         10/27/2022     9:00 AM 11/9/2023     8:49 AM 11/18/2024     9:42 AM   PHQ   PHQ-9 Total Score 0 4 0   Q9: Thoughts of better off dead/self-harm past 2 weeks Not at all Not at all  Not at all       Patient-reported         5/8/2023     9:27 AM 11/9/2023     8:51 AM 11/18/2024     9:42 AM   ROBYN-7 SCORE   Total Score  0 (minimal anxiety)    Total Score 0 0 0         11/18/2024     9:42 AM   Last PHQ-9   1.  Little interest or pleasure in doing things 0   2.  Feeling down, depressed, or hopeless 0   3.  Trouble falling or staying asleep, or sleeping too much 0   4.  Feeling tired or having little energy 0   5.  Poor appetite or overeating 0   6.  Feeling bad about yourself 0   7.  Trouble concentrating 0   8.  Moving slowly or restless 0   Q9: Thoughts of better off dead/self-harm past 2 weeks 0   PHQ-9 Total Score 0         11/18/2024     9:42 AM   ROBYN-7    1. Feeling nervous, anxious, or on edge 0   2. Not being able to stop or control worrying 0   3. Worrying too much about different things 0   4. Trouble relaxing 0   5. Being so restless that it is hard to sit still 0   6. Becoming easily annoyed or irritable 0   7. Feeling afraid, as if something awful might happen 0   ROBYN-7 Total Score 0       Suicide Assessment Five-step Evaluation and Treatment (SAFE-T)    Pain History:  When did you first notice your pain? Chronic    Have you seen this provider for your pain in the past? Yes   Where in your body do you have pain? Little higher than low back  Are you seeing anyone else for your pain? No  Last GINA 6/2024 - brief relief.  Activity is limited - can't go distance.  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  Low back and hips. Low back higher than prior.  Right sciatic - steady.  Worse with helping wife who had joint replacement.  Lyrica  8/15/2024.  Prior Norco - 10/325 mg        10/27/2022     9:00 AM 11/9/2023     8:49 AM 11/18/2024     9:42 AM   PHQ-9 SCORE   PHQ-9 Total Score MyChart  4 (Minimal depression)    PHQ-9 Total Score 0 4 0           5/8/2023     9:27 AM 11/9/2023     8:51 AM 11/18/2024     9:42 AM   ROBYN-7 SCORE   Total Score  0 (minimal anxiety)    Total Score 0 0 0         PDMP Review         Value Time User    State PDMP site checked  Yes 11/18/2024 10:18 AM Evelyn Olson MD          Last CSA Agreement:   CSA -- Patient Level:     [Media Unavailable] Controlled Substance Agreement - Opioid - Scan on 2/12/2024  2:18 PM: OPIOID/OPIOID PLUS CONTROLLED SUBSTANCE AGREEMENT   [Media Unavailable] Controlled Substance Agreement - Opioid - Scan on 2/14/2023  1:22 PM: OPIOID/OPIOID PLUS CONTROLLED SUBSTANCE AGREEMENT   [Media Unavailable] Controlled Substance Agreement - Opioid - Scan on 10/29/2021  9:35 AM: OPIOD/OPIOD PLUS CONTROLLED SUBSTANCE AGREEMENT   [Media Unavailable] Controlled Substance Agreement - Opioid - Scan on 10/30/2020 10:59 AM: OPIOD/OPIOD PLUS CONTROLLED SUBSTANCE AGREEMENT   [Media Unavailable] Controlled Substance Agreement - Opioid - Scan on 7/26/2019 12:01 PM: OPIOD/OPIOD PLUS CONTROLLED SUBSTANCE AGREEMENT       Last UDS: 11/18/2024        Review of Systems  Constitutional, HEENT, cardiovascular, pulmonary, gi and gu systems are negative, except as otherwise noted.      Objective    /80 (BP Location: Right arm, Patient Position: Sitting, Cuff Size: Adult Large)   Pulse 64   Temp 97.6  F (36.4  C) (Tympanic)   Ht 1.829 m (6')   Wt 108.9 kg (240 lb)   SpO2 98%   BMI 32.55 kg/m    Body mass index is 32.55 kg/m .  Physical Exam   GENERAL: alert and no distress  EYES: Eyes grossly normal to inspection, PERRL and conjunctivae and sclerae normal  HENT: normal cephalic/atraumatic, right ear: normal: no effusions, no erythema, normal landmarks and PE tube in canal on its side, left ear: normal: no effusions,  no erythema, normal landmarks, nose and mouth without ulcers or lesions, oropharynx clear, and oral mucous membranes moist  NECK: no adenopathy, no asymmetry, masses, or scars  RESP: lungs clear to auscultation - no rales, rhonchi or wheezes  CV: regular rates and rhythm, normal S1 S2, no S3 or S4, grade 2/6 systolic murmur,  peripheral pulses strong, and no peripheral edema  ABDOMEN: soft, nontender, no hepatosplenomegaly, no masses and bowel sounds normal  MS: normal muscle tone, trace edema to bilateral ankles, lower legs, and SLR right positive, left negative; gait slow, cautious; strength symmetric  SKIN: no suspicious lesions or rashes  NEURO: Normal strength and tone, mentation intact and speech normal  PSYCH: mentation appears normal, affect normal/bright    PE tube in right canal on side.    Results for orders placed or performed in visit on 11/18/24 (from the past 24 hours)   Hemoglobin A1c   Result Value Ref Range    Estimated Average Glucose 123 (H) <117 mg/dL    Hemoglobin A1C 5.9 (H) <5.7 %   Lipid Profile (Chol, Trig, HDL, LDL calc)   Result Value Ref Range    Cholesterol 128 <200 mg/dL    Triglycerides 269 (H) <150 mg/dL    Direct Measure HDL 32 (L) >=40 mg/dL    LDL Cholesterol Calculated 42 <100 mg/dL    Non HDL Cholesterol 96 <130 mg/dL    Patient Fasting > 8hrs? Yes     Narrative    Cholesterol  Desirable: < 200 mg/dL  Borderline High: 200 - 239 mg/dL  High: >= 240 mg/dL    Triglycerides  Normal: < 150 mg/dL  Borderline High: 150 - 199 mg/dL  High: 200-499 mg/dL  Very High: >= 500 mg/dL    Direct Measure HDL  Female: >= 50 mg/dL   Male: >= 40 mg/dL    LDL Cholesterol  Desirable: < 100 mg/dL  Above Desirable: 100 - 129 mg/dL   Borderline High: 130 - 159 mg/dL   High:  160 - 189 mg/dL   Very High: >= 190 mg/dL    Non HDL Cholesterol  Desirable: < 130 mg/dL  Above Desirable: 130 - 159 mg/dL  Borderline High: 160 - 189 mg/dL  High: 190 - 219 mg/dL  Very High: >= 220 mg/dL   CBC with platelets and  differential    Narrative    The following orders were created for panel order CBC with platelets and differential.  Procedure                               Abnormality         Status                     ---------                               -----------         ------                     CBC with platelets and d...[[id number]]                      Final result                 Please view results for these tests on the individual orders.   Comprehensive metabolic panel (BMP + Alb, Alk Phos, ALT, AST, Total. Bili, TP)   Result Value Ref Range    Sodium 142 135 - 145 mmol/L    Potassium 4.5 3.4 - 5.3 mmol/L    Carbon Dioxide (CO2) 25 22 - 29 mmol/L    Anion Gap 11 7 - 15 mmol/L    Urea Nitrogen 17.3 8.0 - 23.0 mg/dL    Creatinine 1.21 (H) 0.67 - 1.17 mg/dL    GFR Estimate 60 (L) >60 mL/min/1.73m2    Calcium 9.4 8.8 - 10.4 mg/dL    Chloride 106 98 - 107 mmol/L    Glucose 102 (H) 70 - 99 mg/dL    Alkaline Phosphatase 78 40 - 150 U/L    AST 23 0 - 45 U/L    ALT 21 0 - 70 U/L    Protein Total 7.0 6.4 - 8.3 g/dL    Albumin 4.3 3.5 - 5.2 g/dL    Bilirubin Total 0.5 <=1.2 mg/dL    Patient Fasting > 8hrs? Yes    Drug Confirmation Panel Urine with Creat    Narrative    The following orders were created for panel order Drug Confirmation Panel Urine with Creat.  Procedure                               Abnormality         Status                     ---------                               -----------         ------                     Urine Drug Confirmation ...[[id number]]                      In process                 Urine Creatinine for Willy...[[id number]]                      Final result                 Please view results for these tests on the individual orders.   CBC with platelets and differential   Result Value Ref Range    WBC Count 6.1 4.0 - 11.0 10e3/uL    RBC Count 5.16 4.40 - 5.90 10e6/uL    Hemoglobin 15.0 13.3 - 17.7 g/dL    Hematocrit 45.9 40.0 - 53.0 %    MCV 89 78 - 100 fL    MCH 29.1 26.5 - 33.0 pg    MCHC 32.7  31.5 - 36.5 g/dL    RDW 13.8 10.0 - 15.0 %    Platelet Count 236 150 - 450 10e3/uL    % Neutrophils 50 %    % Lymphocytes 36 %    % Monocytes 9 %    % Eosinophils 3 %    % Basophils 2 %    % Immature Granulocytes 0 %    NRBCs per 100 WBC 0 <1 /100    Absolute Neutrophils 3.0 1.6 - 8.3 10e3/uL    Absolute Lymphocytes 2.2 0.8 - 5.3 10e3/uL    Absolute Monocytes 0.6 0.0 - 1.3 10e3/uL    Absolute Eosinophils 0.2 0.0 - 0.7 10e3/uL    Absolute Basophils 0.1 0.0 - 0.2 10e3/uL    Absolute Immature Granulocytes 0.0 <=0.4 10e3/uL    Absolute NRBCs 0.0 10e3/uL   Urine Creatinine for Drug Screen Panel   Result Value Ref Range    Creatinine Urine for Drug Screen 102 mg/dL           Signed Electronically by: Evelyn Iniguez MD

## 2024-11-18 ENCOUNTER — OFFICE VISIT (OUTPATIENT)
Dept: FAMILY MEDICINE | Facility: OTHER | Age: 84
End: 2024-11-18
Attending: FAMILY MEDICINE
Payer: COMMERCIAL

## 2024-11-18 ENCOUNTER — LAB (OUTPATIENT)
Dept: LAB | Facility: OTHER | Age: 84
End: 2024-11-18
Attending: FAMILY MEDICINE
Payer: COMMERCIAL

## 2024-11-18 VITALS
BODY MASS INDEX: 32.51 KG/M2 | SYSTOLIC BLOOD PRESSURE: 130 MMHG | HEART RATE: 64 BPM | TEMPERATURE: 97.6 F | OXYGEN SATURATION: 98 % | WEIGHT: 240 LBS | DIASTOLIC BLOOD PRESSURE: 80 MMHG | HEIGHT: 72 IN

## 2024-11-18 DIAGNOSIS — N18.31 STAGE 3A CHRONIC KIDNEY DISEASE (H): ICD-10-CM

## 2024-11-18 DIAGNOSIS — E78.5 HYPERLIPIDEMIA, UNSPECIFIED HYPERLIPIDEMIA TYPE: Primary | ICD-10-CM

## 2024-11-18 DIAGNOSIS — E78.5 HYPERLIPIDEMIA, UNSPECIFIED HYPERLIPIDEMIA TYPE: ICD-10-CM

## 2024-11-18 DIAGNOSIS — E66.811 OBESITY (BMI 30.0-34.9): ICD-10-CM

## 2024-11-18 DIAGNOSIS — G89.4 CHRONIC PAIN SYNDROME: ICD-10-CM

## 2024-11-18 DIAGNOSIS — I25.10 ATHEROSCLEROSIS OF NATIVE CORONARY ARTERY OF NATIVE HEART WITHOUT ANGINA PECTORIS: ICD-10-CM

## 2024-11-18 DIAGNOSIS — M51.360 DEGENERATION OF INTERVERTEBRAL DISC OF LUMBAR REGION WITH DISCOGENIC BACK PAIN: ICD-10-CM

## 2024-11-18 DIAGNOSIS — R73.03 PREDIABETES: ICD-10-CM

## 2024-11-18 DIAGNOSIS — I10 ESSENTIAL HYPERTENSION: ICD-10-CM

## 2024-11-18 DIAGNOSIS — R73.01 ELEVATED FASTING GLUCOSE: ICD-10-CM

## 2024-11-18 DIAGNOSIS — G47.30 SLEEP APNEA, UNSPECIFIED TYPE: ICD-10-CM

## 2024-11-18 LAB
ALBUMIN SERPL BCG-MCNC: 4.3 G/DL (ref 3.5–5.2)
ALP SERPL-CCNC: 78 U/L (ref 40–150)
ALT SERPL W P-5'-P-CCNC: 21 U/L (ref 0–70)
ANION GAP SERPL CALCULATED.3IONS-SCNC: 11 MMOL/L (ref 7–15)
AST SERPL W P-5'-P-CCNC: 23 U/L (ref 0–45)
BASOPHILS # BLD AUTO: 0.1 10E3/UL (ref 0–0.2)
BASOPHILS NFR BLD AUTO: 2 %
BILIRUB SERPL-MCNC: 0.5 MG/DL
BUN SERPL-MCNC: 17.3 MG/DL (ref 8–23)
CALCIUM SERPL-MCNC: 9.4 MG/DL (ref 8.8–10.4)
CHLORIDE SERPL-SCNC: 106 MMOL/L (ref 98–107)
CHOLEST SERPL-MCNC: 128 MG/DL
CREAT SERPL-MCNC: 1.21 MG/DL (ref 0.67–1.17)
CREAT UR-MCNC: 102 MG/DL
EGFRCR SERPLBLD CKD-EPI 2021: 60 ML/MIN/1.73M2
EOSINOPHIL # BLD AUTO: 0.2 10E3/UL (ref 0–0.7)
EOSINOPHIL NFR BLD AUTO: 3 %
ERYTHROCYTE [DISTWIDTH] IN BLOOD BY AUTOMATED COUNT: 13.8 % (ref 10–15)
EST. AVERAGE GLUCOSE BLD GHB EST-MCNC: 123 MG/DL
FASTING STATUS PATIENT QL REPORTED: YES
FASTING STATUS PATIENT QL REPORTED: YES
GLUCOSE SERPL-MCNC: 102 MG/DL (ref 70–99)
HBA1C MFR BLD: 5.9 %
HCO3 SERPL-SCNC: 25 MMOL/L (ref 22–29)
HCT VFR BLD AUTO: 45.9 % (ref 40–53)
HDLC SERPL-MCNC: 32 MG/DL
HGB BLD-MCNC: 15 G/DL (ref 13.3–17.7)
IMM GRANULOCYTES # BLD: 0 10E3/UL
IMM GRANULOCYTES NFR BLD: 0 %
LDLC SERPL CALC-MCNC: 42 MG/DL
LYMPHOCYTES # BLD AUTO: 2.2 10E3/UL (ref 0.8–5.3)
LYMPHOCYTES NFR BLD AUTO: 36 %
MCH RBC QN AUTO: 29.1 PG (ref 26.5–33)
MCHC RBC AUTO-ENTMCNC: 32.7 G/DL (ref 31.5–36.5)
MCV RBC AUTO: 89 FL (ref 78–100)
MONOCYTES # BLD AUTO: 0.6 10E3/UL (ref 0–1.3)
MONOCYTES NFR BLD AUTO: 9 %
NEUTROPHILS # BLD AUTO: 3 10E3/UL (ref 1.6–8.3)
NEUTROPHILS NFR BLD AUTO: 50 %
NONHDLC SERPL-MCNC: 96 MG/DL
NRBC # BLD AUTO: 0 10E3/UL
NRBC BLD AUTO-RTO: 0 /100
PLATELET # BLD AUTO: 236 10E3/UL (ref 150–450)
POTASSIUM SERPL-SCNC: 4.5 MMOL/L (ref 3.4–5.3)
PROT SERPL-MCNC: 7 G/DL (ref 6.4–8.3)
RBC # BLD AUTO: 5.16 10E6/UL (ref 4.4–5.9)
SODIUM SERPL-SCNC: 142 MMOL/L (ref 135–145)
TRIGL SERPL-MCNC: 269 MG/DL
WBC # BLD AUTO: 6.1 10E3/UL (ref 4–11)

## 2024-11-18 PROCEDURE — 83036 HEMOGLOBIN GLYCOSYLATED A1C: CPT | Mod: ZL

## 2024-11-18 PROCEDURE — 84155 ASSAY OF PROTEIN SERUM: CPT | Mod: ZL

## 2024-11-18 PROCEDURE — G0463 HOSPITAL OUTPT CLINIC VISIT: HCPCS

## 2024-11-18 PROCEDURE — 82247 BILIRUBIN TOTAL: CPT | Mod: ZL

## 2024-11-18 PROCEDURE — 85025 COMPLETE CBC W/AUTO DIFF WBC: CPT | Mod: ZL

## 2024-11-18 PROCEDURE — 80372 DRUG SCREENING TAPENTADOL: CPT | Mod: ZL

## 2024-11-18 PROCEDURE — 36415 COLL VENOUS BLD VENIPUNCTURE: CPT | Mod: ZL

## 2024-11-18 PROCEDURE — 80360 METHYLPHENIDATE: CPT | Mod: ZL

## 2024-11-18 PROCEDURE — 82465 ASSAY BLD/SERUM CHOLESTEROL: CPT | Mod: ZL

## 2024-11-18 RX ORDER — OXYCODONE HYDROCHLORIDE 5 MG/1
5 TABLET ORAL 2 TIMES DAILY PRN
Qty: 30 TABLET | Refills: 0 | Status: SHIPPED | OUTPATIENT
Start: 2024-11-18

## 2024-11-18 ASSESSMENT — ANXIETY QUESTIONNAIRES
GAD7 TOTAL SCORE: 0
GAD7 TOTAL SCORE: 0
7. FEELING AFRAID AS IF SOMETHING AWFUL MIGHT HAPPEN: NOT AT ALL
4. TROUBLE RELAXING: NOT AT ALL
5. BEING SO RESTLESS THAT IT IS HARD TO SIT STILL: NOT AT ALL
2. NOT BEING ABLE TO STOP OR CONTROL WORRYING: NOT AT ALL
3. WORRYING TOO MUCH ABOUT DIFFERENT THINGS: NOT AT ALL
6. BECOMING EASILY ANNOYED OR IRRITABLE: NOT AT ALL
1. FEELING NERVOUS, ANXIOUS, OR ON EDGE: NOT AT ALL

## 2024-11-18 ASSESSMENT — PATIENT HEALTH QUESTIONNAIRE - PHQ9: SUM OF ALL RESPONSES TO PHQ QUESTIONS 1-9: 0

## 2024-11-18 NOTE — PATIENT INSTRUCTIONS
Labs stable overall.  Continue current medications.  Add trial of Oxycodone 5 mg twice daily as needed.    Assess - does it help with function?  Mobility? Able to do more walking, etc.        Results for orders placed or performed in visit on 11/18/24 (from the past 24 hours)   Hemoglobin A1c   Result Value Ref Range    Estimated Average Glucose 123 (H) <117 mg/dL    Hemoglobin A1C 5.9 (H) <5.7 %   Lipid Profile (Chol, Trig, HDL, LDL calc)   Result Value Ref Range    Cholesterol 128 <200 mg/dL    Triglycerides 269 (H) <150 mg/dL    Direct Measure HDL 32 (L) >=40 mg/dL    LDL Cholesterol Calculated 42 <100 mg/dL    Non HDL Cholesterol 96 <130 mg/dL    Patient Fasting > 8hrs? Yes     Narrative    Cholesterol  Desirable: < 200 mg/dL  Borderline High: 200 - 239 mg/dL  High: >= 240 mg/dL    Triglycerides  Normal: < 150 mg/dL  Borderline High: 150 - 199 mg/dL  High: 200-499 mg/dL  Very High: >= 500 mg/dL    Direct Measure HDL  Female: >= 50 mg/dL   Male: >= 40 mg/dL    LDL Cholesterol  Desirable: < 100 mg/dL  Above Desirable: 100 - 129 mg/dL   Borderline High: 130 - 159 mg/dL   High:  160 - 189 mg/dL   Very High: >= 190 mg/dL    Non HDL Cholesterol  Desirable: < 130 mg/dL  Above Desirable: 130 - 159 mg/dL  Borderline High: 160 - 189 mg/dL  High: 190 - 219 mg/dL  Very High: >= 220 mg/dL   CBC with platelets and differential    Narrative    The following orders were created for panel order CBC with platelets and differential.  Procedure                               Abnormality         Status                     ---------                               -----------         ------                     CBC with platelets and d...[[id number]]                      Final result                 Please view results for these tests on the individual orders.   Comprehensive metabolic panel (BMP + Alb, Alk Phos, ALT, AST, Total. Bili, TP)   Result Value Ref Range    Sodium 142 135 - 145 mmol/L    Potassium 4.5 3.4 - 5.3 mmol/L    Carbon  Dioxide (CO2) 25 22 - 29 mmol/L    Anion Gap 11 7 - 15 mmol/L    Urea Nitrogen 17.3 8.0 - 23.0 mg/dL    Creatinine 1.21 (H) 0.67 - 1.17 mg/dL    GFR Estimate 60 (L) >60 mL/min/1.73m2    Calcium 9.4 8.8 - 10.4 mg/dL    Chloride 106 98 - 107 mmol/L    Glucose 102 (H) 70 - 99 mg/dL    Alkaline Phosphatase 78 40 - 150 U/L    AST 23 0 - 45 U/L    ALT 21 0 - 70 U/L    Protein Total 7.0 6.4 - 8.3 g/dL    Albumin 4.3 3.5 - 5.2 g/dL    Bilirubin Total 0.5 <=1.2 mg/dL    Patient Fasting > 8hrs? Yes    Drug Confirmation Panel Urine with Creat    Narrative    The following orders were created for panel order Drug Confirmation Panel Urine with Creat.  Procedure                               Abnormality         Status                     ---------                               -----------         ------                     Urine Drug Confirmation ...[[id number]]                      In process                 Urine Creatinine for Willy...[[id number]]                      In process                   Please view results for these tests on the individual orders.   CBC with platelets and differential   Result Value Ref Range    WBC Count 6.1 4.0 - 11.0 10e3/uL    RBC Count 5.16 4.40 - 5.90 10e6/uL    Hemoglobin 15.0 13.3 - 17.7 g/dL    Hematocrit 45.9 40.0 - 53.0 %    MCV 89 78 - 100 fL    MCH 29.1 26.5 - 33.0 pg    MCHC 32.7 31.5 - 36.5 g/dL    RDW 13.8 10.0 - 15.0 %    Platelet Count 236 150 - 450 10e3/uL    % Neutrophils 50 %    % Lymphocytes 36 %    % Monocytes 9 %    % Eosinophils 3 %    % Basophils 2 %    % Immature Granulocytes 0 %    NRBCs per 100 WBC 0 <1 /100    Absolute Neutrophils 3.0 1.6 - 8.3 10e3/uL    Absolute Lymphocytes 2.2 0.8 - 5.3 10e3/uL    Absolute Monocytes 0.6 0.0 - 1.3 10e3/uL    Absolute Eosinophils 0.2 0.0 - 0.7 10e3/uL    Absolute Basophils 0.1 0.0 - 0.2 10e3/uL    Absolute Immature Granulocytes 0.0 <=0.4 10e3/uL    Absolute NRBCs 0.0 10e3/uL

## 2024-11-21 LAB — PREGABALIN UR QL CFM: PRESENT

## 2024-11-22 ENCOUNTER — TELEPHONE (OUTPATIENT)
Dept: FAMILY MEDICINE | Facility: OTHER | Age: 84
End: 2024-11-22

## 2024-11-22 DIAGNOSIS — G89.4 CHRONIC PAIN SYNDROME: ICD-10-CM

## 2024-11-22 DIAGNOSIS — M51.360 DEGENERATION OF INTERVERTEBRAL DISC OF LUMBAR REGION WITH DISCOGENIC BACK PAIN: ICD-10-CM

## 2024-11-22 NOTE — TELEPHONE ENCOUNTER
Oxycodone   (see below)  Pt states [facility] will only fill 14     Last Written Prescription Date:  11/18/24  Last Fill Quantity: 30,   # refills: 0  Last Office Visit: 11/18/24  Future Office visit:    Next 5 appointments (look out 90 days)      Feb 20, 2025 11:15 AM  (Arrive by 11:00 AM)  Provider Visit with Evelyn Olson MD  [facility] [place] ([facility] [place] ) [address] [place] [address] [address]VE  [place] [address]  [phone]             Routing refill request to provider for review/approval because:  Drug not on the FMG, UMP or  Health refill protocol or controlled substance

## 2024-11-22 NOTE — TELEPHONE ENCOUNTER
Reason for call:  Medication      Have you contacted your pharmacy? Yes   If patient has contacted Pharmacy and it has been over 72hrs, continue to #2  Medication oxyCODONE (ROXICODONE) 5 MG tablet   [facility] could only give the patient 14 pills for the first time and the prescription said 30 and he has only 5 pills left   What Pharmacy do you use? [facility]      (Please note that the turn-around-time for prescriptions is 72 business hours; I am sending your request at this time. SEND TO appropriate Care Team Pool )

## 2024-11-25 RX ORDER — OXYCODONE HYDROCHLORIDE 5 MG/1
5 TABLET ORAL 2 TIMES DAILY PRN
Qty: 60 TABLET | Refills: 0 | Status: SHIPPED | OUTPATIENT
Start: 2024-11-25

## 2024-11-25 NOTE — TELEPHONE ENCOUNTER
Ok - please notify patient of reasoning.  Tell him to update me when script is running low, and if effective, can do next script for longer supply.

## 2024-11-25 NOTE — TELEPHONE ENCOUNTER
Pt was only able to receive #14 tabs - a 1 week supply - due to insurance.  First opioid RX - only able to get 7 day supply.

## 2025-01-06 DIAGNOSIS — G89.4 CHRONIC PAIN SYNDROME: ICD-10-CM

## 2025-01-06 DIAGNOSIS — M51.360 DEGENERATION OF INTERVERTEBRAL DISC OF LUMBAR REGION WITH DISCOGENIC BACK PAIN: ICD-10-CM

## 2025-01-06 NOTE — TELEPHONE ENCOUNTER
Oxycodone 5mg      Last Written Prescription Date:  11/25/24  Last Fill Quantity: 60,   # refills: 0  Last Office Visit: 11/18/24  Future Office visit:    Next 5 appointments (look out 90 days)      Feb 20, 2025 11:15 AM  (Arrive by 11:00 AM)  Provider Visit with Evelyn Olson MD  [facility] [place] ([facility] [place] ) [address]  [place] [address]  [phone]

## 2025-01-07 RX ORDER — OXYCODONE HYDROCHLORIDE 5 MG/1
5 TABLET ORAL 2 TIMES DAILY PRN
Qty: 60 TABLET | Refills: 0 | Status: SHIPPED | OUTPATIENT
Start: 2025-01-07

## 2025-02-06 DIAGNOSIS — E78.5 HYPERLIPIDEMIA, UNSPECIFIED HYPERLIPIDEMIA TYPE: ICD-10-CM

## 2025-02-06 RX ORDER — ATORVASTATIN CALCIUM 80 MG/1
80 TABLET, FILM COATED ORAL DAILY
Qty: 90 TABLET | Refills: 1 | Status: SHIPPED | OUTPATIENT
Start: 2025-02-06

## 2025-02-17 DIAGNOSIS — M51.360 DEGENERATION OF INTERVERTEBRAL DISC OF LUMBAR REGION WITH DISCOGENIC BACK PAIN: ICD-10-CM

## 2025-02-17 DIAGNOSIS — G89.4 CHRONIC PAIN SYNDROME: ICD-10-CM

## 2025-02-17 RX ORDER — OXYCODONE HYDROCHLORIDE 5 MG/1
5 TABLET ORAL 2 TIMES DAILY PRN
Qty: 60 TABLET | Refills: 0 | Status: SHIPPED | OUTPATIENT
Start: 2025-02-17

## 2025-02-17 NOTE — TELEPHONE ENCOUNTER
Oxycodone      Last Written Prescription Date:  01/07/25  Last Fill Quantity: 60,   # refills: 0  Last Office Visit: 11/18/24  Future Office visit:    Next 5 appointments (look out 90 days)      Feb 20, 2025 11:15 AM  (Arrive by 11:00 AM)  Provider Visit with Evelyn Olson MD  [facility] [place] ([facility] [place] ) [address]  [place] [address]  [phone]

## 2025-02-18 NOTE — PROGRESS NOTES
Assessment & Plan     Chronic pain syndrome  Chronic, continuous use of opioids  Stable.  Oxycodone well tolerated and helpful for pain management for ADLs, mobility, later in the day.  No side effects.  Contract updated.  UDS up to date.  Follow up every 3 months.    Hyperlipidemia, unspecified hyperlipidemia type  Annual labs up to date.  Continue statin.    Elevated fasting glucose  Annual A1c up to date.    Essential hypertension  At goal.  Refills done.  - amLODIPine (NORVASC) 10 MG tablet; Take 1 tablet (10 mg) by mouth daily.  - furosemide (LASIX) 20 MG tablet; Take 1 tablet (20 mg) by mouth daily.    Atherosclerosis of native coronary artery of native heart without angina pectoris  Risk factor management    Edema, lower extremity  Controlled with Lasix.  Continue.  - furosemide (LASIX) 20 MG tablet; Take 1 tablet (20 mg) by mouth daily.            The longitudinal plan of care for the diagnosis(es)/condition(s) as documented were addressed during this visit. Due to the added complexity in care, I will continue to support Zoran in the subsequent management and with ongoing continuity of care.    Return in about 3 months (around 5/20/2025) for CDM; and 6 month physical/medicare.    Subjective   Zoran is a 85 year old, presenting for the following health issues:  Lipids, Hypertension, Vascular Disease, and Back Pain    HPI     Print contract - printed and signed    Hyperlipidemia Follow-Up and prediabetes  Are you regularly taking any medication or supplement to lower your cholesterol?   Yes- lipitor 80mg  Are you having muscle aches or other side effects that you think could be caused by your cholesterol lowering medication?  Yes- having muscle aches, not sure if its related to the medication     Hypertension Follow-up  Do you check your blood pressure regularly outside of the clinic? Yes   Are you following a low salt diet? Yes  Are your blood pressures ever more than 140 on the top number (systolic) OR  more   than 90 on the bottom number (diastolic), for example 140/90? No  Once and a while check at home - stable    Vascular Disease Follow-up  How often do you take nitroglycerin? Never  Do you take an aspirin every day? Yes    Chronic/Recurring Back Pain Follow Up  Where is your back pain located? (Select all that apply) low back right  How would you describe your back pain?  shooting and stabbing  Where does your back pain spread? the right buttock, the right  thigh, and the right  knee  Since your last clinic visit for back pain, how has your pain changed? always present, but gets better and worse  Does your back pain interfere with your job? Not applicable  Since your last visit, have you tried any new treatment? No  PDMP reviewed  Oxycodone 60 mg filled 1/7/25, 2/17/25; 1 tab BID prn  Lyrica 150 mg as well TID  Mornings feels best; takes Oxy in afternoon and evening  Helps with activity  No side effects other than constipation; manageable        Review of Systems  Constitutional, HEENT, cardiovascular, pulmonary, gi and gu systems are negative, except as otherwise noted.      Objective    /70 (BP Location: Right arm, Patient Position: Sitting, Cuff Size: Adult Regular)   Pulse 69   Temp 97.3  F (36.3  C) (Tympanic)   Ht 1.829 m (6')   Wt 110.5 kg (243 lb 9.6 oz)   SpO2 98%   BMI 33.04 kg/m    Body mass index is 33.04 kg/m .  Physical Exam   GENERAL: alert and no distress  EYES: Eyes grossly normal to inspection, PERRL and conjunctivae and sclerae normal  HENT: ear canals and TM's normal, nose and mouth without ulcers or lesions  NECK: no adenopathy, no asymmetry, masses, or scars  RESP: lungs clear to auscultation - no rales, rhonchi or wheezes  CV: regular rate and rhythm, normal S1 S2, no S3 or S4, no murmur, click or rub, no peripheral edema  ABDOMEN: soft, nontender, no hepatosplenomegaly, no masses and bowel sounds normal  MS: no gross musculoskeletal defects noted, no edema  NEURO: Normal  strength and tone, mentation intact and speech normal  PSYCH: mentation appears normal, affect normal/bright            Signed Electronically by: Evelyn Iniguez MD

## 2025-02-20 ENCOUNTER — OFFICE VISIT (OUTPATIENT)
Dept: FAMILY MEDICINE | Facility: OTHER | Age: 85
End: 2025-02-20
Attending: FAMILY MEDICINE
Payer: COMMERCIAL

## 2025-02-20 VITALS
HEIGHT: 72 IN | BODY MASS INDEX: 33 KG/M2 | WEIGHT: 243.6 LBS | HEART RATE: 69 BPM | SYSTOLIC BLOOD PRESSURE: 130 MMHG | OXYGEN SATURATION: 98 % | DIASTOLIC BLOOD PRESSURE: 70 MMHG | TEMPERATURE: 97.3 F

## 2025-02-20 DIAGNOSIS — R60.0 EDEMA, LOWER EXTREMITY: ICD-10-CM

## 2025-02-20 DIAGNOSIS — I10 ESSENTIAL HYPERTENSION: ICD-10-CM

## 2025-02-20 DIAGNOSIS — I25.10 ATHEROSCLEROSIS OF NATIVE CORONARY ARTERY OF NATIVE HEART WITHOUT ANGINA PECTORIS: ICD-10-CM

## 2025-02-20 DIAGNOSIS — G89.4 CHRONIC PAIN SYNDROME: Primary | ICD-10-CM

## 2025-02-20 DIAGNOSIS — E78.5 HYPERLIPIDEMIA, UNSPECIFIED HYPERLIPIDEMIA TYPE: ICD-10-CM

## 2025-02-20 DIAGNOSIS — R73.01 ELEVATED FASTING GLUCOSE: ICD-10-CM

## 2025-02-20 DIAGNOSIS — F11.90 CHRONIC, CONTINUOUS USE OF OPIOIDS: Chronic | ICD-10-CM

## 2025-02-20 PROCEDURE — G0463 HOSPITAL OUTPT CLINIC VISIT: HCPCS

## 2025-02-20 RX ORDER — FUROSEMIDE 20 MG/1
20 TABLET ORAL DAILY
Qty: 90 TABLET | Refills: 1 | Status: SHIPPED | OUTPATIENT
Start: 2025-02-20

## 2025-02-20 RX ORDER — AMLODIPINE BESYLATE 10 MG/1
10 TABLET ORAL DAILY
Qty: 90 TABLET | Refills: 1 | Status: SHIPPED | OUTPATIENT
Start: 2025-02-20

## 2025-02-20 ASSESSMENT — PAIN SCALES - GENERAL: PAINLEVEL_OUTOF10: MODERATE PAIN (4)

## 2025-02-20 NOTE — LETTER

## 2025-04-02 DIAGNOSIS — G89.4 CHRONIC PAIN SYNDROME: ICD-10-CM

## 2025-04-02 DIAGNOSIS — M51.360 DEGENERATION OF INTERVERTEBRAL DISC OF LUMBAR REGION WITH DISCOGENIC BACK PAIN: ICD-10-CM

## 2025-04-02 RX ORDER — OXYCODONE HYDROCHLORIDE 5 MG/1
5 TABLET ORAL 2 TIMES DAILY PRN
Qty: 60 TABLET | Refills: 0 | Status: SHIPPED | OUTPATIENT
Start: 2025-04-02

## 2025-04-02 NOTE — TELEPHONE ENCOUNTER
oxyCODONE (ROXICODONE) 5 MG tablet       Last Written Prescription Date:  2/17/25  Last Fill Quantity: 60,   # refills: 0  Last Office Visit: 2/20/25  Future Office visit:    Next 5 appointments (look out 90 days)      May 22, 2025 9:45 AM  (Arrive by 9:30 AM)  Provider Visit with Evelyn Olson MD  [facility] ([facility] [place] ) [address]  [place] [address]  [phone]             Routing refill request to provider for review/approval because:  Drug not on the FMG, UMP or  Health refill protocol or controlled substance

## 2025-04-09 DIAGNOSIS — I10 ESSENTIAL HYPERTENSION: ICD-10-CM

## 2025-04-09 RX ORDER — LOSARTAN POTASSIUM 50 MG/1
50 TABLET ORAL DAILY
Qty: 90 TABLET | Refills: 0 | Status: SHIPPED | OUTPATIENT
Start: 2025-04-09

## 2025-04-09 NOTE — TELEPHONE ENCOUNTER
losartan (COZAAR) 50 MG tablet       Last Written Prescription Date:  7/16/24  Last Fill Quantity: 90,   # refills: 3  Last Office Visit: 2/20/25  Future Office visit:    Next 5 appointments (look out 90 days)      May 22, 2025 9:45 AM  (Arrive by 9:30 AM)  Provider Visit with Evelyn Olson MD  [facility] [place] ([facility] [place] ) [address]  [place] [address]  [phone]             Routing refill request to provider for review/approval because:  Angiotensin-II Receptors Failed      Has GFR on file in past 12 months and most recent value is normal

## 2025-05-06 DIAGNOSIS — G89.4 CHRONIC PAIN SYNDROME: ICD-10-CM

## 2025-05-06 DIAGNOSIS — M51.360 DEGENERATION OF INTERVERTEBRAL DISC OF LUMBAR REGION WITH DISCOGENIC BACK PAIN: ICD-10-CM

## 2025-05-06 RX ORDER — OXYCODONE HYDROCHLORIDE 5 MG/1
5 TABLET ORAL 2 TIMES DAILY PRN
Qty: 60 TABLET | Refills: 0 | Status: SHIPPED | OUTPATIENT
Start: 2025-05-06

## 2025-05-06 NOTE — TELEPHONE ENCOUNTER
Oxycodone      Last Written Prescription Date:  4.2.25  Last Fill Quantity: #60,   # refills: 0  Last Office Visit: 2.20.25  Future Office visit:    Next 5 appointments (look out 90 days)      May 22, 2025 9:45 AM  (Arrive by 9:30 AM)  Provider Visit with Evelyn Olson MD  [facility] ([facility] [place] ) [address]  [place] [address]  [phone]             Routing refill request to provider for review/approval because:  Drug not on the [facility], [facility] or [facility] refill protocol or controlled substance

## 2025-05-19 NOTE — PROGRESS NOTES
Assessment & Plan     Chronic pain syndrome  Fair control with current regimen.  Pdmp reviewed.  Last MRI 1 year ago.  Last injections not helpful.  Told prior would not advise surgery at this point/age.  Concern about some deconditioning, falls risk.  Handicap permit signed.  Script for wheeled walker with seat.  Follow up 3 months.  Consider PT course.  - Walker Order for DME - ONLY FOR DME    Degeneration of intervertebral disc of lumbar region with discogenic back pain  As above.  - Walker Order for DME - ONLY FOR DME    Lumbar radiculopathy  As above.  - Walker Order for DME - ONLY FOR DME    Essential hypertension  Stable.    Hyperlipidemia, unspecified hyperlipidemia type  Stable.  Annual lab due in 11/2025.    Elevated fasting glucose  Stable.    Stage 3a chronic kidney disease (H)  Updating lab today.  Avoid NSIADs.  - Renal panel (Alb, BUN, Ca, Cl, CO2, Creat, Gluc, Phos, K, Na); Future  - CBC with platelets and differential; Future    Impaired gait  As above - falls risk.  - Walker Order for DME - ONLY FOR DME    At high risk for falls  As above - falls risk.  - Walker Order for DME - ONLY FOR DME    Yeast infection of the skin  Groin.  Discussed general cares, cotton, air dry, barrier creams  Add Nystatin BID.  - nystatin (MYCOSTATIN) 648485 UNIT/GM external cream; Apply topically 2 times daily.    COVID booster given.    The longitudinal plan of care for the diagnosis(es)/condition(s) as documented were addressed during this visit. Due to the added complexity in care, I will continue to support Zoran in the subsequent management and with ongoing continuity of care.      BMI  Estimated body mass index is 32.82 kg/m  as calculated from the following:    Height as of this encounter: 1.829 m (6').    Weight as of this encounter: 109.8 kg (242 lb).   Weight management plan: Discussed healthy diet and exercise guidelines      Follow-up  Return in about 3 months (around 8/22/2025) for chronic  pain.    Subjective   Zoran is a 85 year old, presenting for the following health issues:  Lipids, Hypertension, Depression, and Anxiety    HPI      Covid booster - agreeable     Groin - sweats - gets sore - otc triple neosporin    Handicap parking permit - requesting.    Pain History:  When did you first notice your pain? Chronic    Have you seen this provider for your pain in the past? Yes   Where in your body do you have pain? Low back and sciatica pain   Are you seeing anyone else for your pain? No  Limited by pain - aching  No falls  No new injury  Variable which leg  Progressive weakness.  No cane use - but has it.  No walker.  Morning is best.            11/9/2023     8:49 AM 11/18/2024     9:42 AM 5/22/2025     9:38 AM   PHQ-9 SCORE   PHQ-9 Total Score MyChart 4 (Minimal depression)     PHQ-9 Total Score 4 0 0           11/9/2023     8:51 AM 11/18/2024     9:42 AM 5/22/2025     9:38 AM   ROBYN-7 SCORE   Total Score 0 (minimal anxiety)     Total Score 0 0 0           5/8/2023     9:29 AM 11/9/2023     8:46 AM 5/22/2025     9:36 AM   PEG Score   PEG Total Score 1.67 2.33 6           11/9/2023     8:49 AM 11/18/2024     9:42 AM 5/22/2025     9:38 AM   PHQ-9 SCORE   PHQ-9 Total Score MyChart 4 (Minimal depression)     PHQ-9 Total Score 4 0 0           11/9/2023     8:51 AM 11/18/2024     9:42 AM 5/22/2025     9:38 AM   ROBYN-7 SCORE   Total Score 0 (minimal anxiety)     Total Score 0 0 0           5/8/2023     9:29 AM 11/9/2023     8:46 AM 5/22/2025     9:36 AM   PEG Score   PEG Total Score 1.67 2.33 6         PDMP Review         Value Time User    State PDMP site checked  Yes 5/22/2025  9:41 AM Evelyn Olson MD          Last CSA Agreement:   CSA -- Patient Level:     [Media Unavailable] Controlled Substance Agreement - Opioid - Scan on 2/20/2025  2:37 PM: OPIOID/OPIOID PLUS CONTROLLED SUBSTANCE AGREEMENT   [Media Unavailable] Controlled Substance Agreement - Opioid - Scan on 2/12/2024  2:18 PM: OPIOID/OPIOID  PLUS CONTROLLED SUBSTANCE AGREEMENT   [Media Unavailable] Controlled Substance Agreement - Opioid - Scan on 2/14/2023  1:22 PM: OPIOID/OPIOID PLUS CONTROLLED SUBSTANCE AGREEMENT   [Media Unavailable] Controlled Substance Agreement - Opioid - Scan on 10/29/2021  9:35 AM: OPIOD/OPIOD PLUS CONTROLLED SUBSTANCE AGREEMENT   [Media Unavailable] Controlled Substance Agreement - Opioid - Scan on 10/30/2020 10:59 AM: OPIOD/OPIOD PLUS CONTROLLED SUBSTANCE AGREEMENT   [Media Unavailable] Controlled Substance Agreement - Opioid - Scan on 7/26/2019 12:01 PM: OPIOD/OPIOD PLUS CONTROLLED SUBSTANCE AGREEMENT       Last UDS: 11/21/2024      Hyperlipidemia Follow-Up and prediabetes  Are you regularly taking any medication or supplement to lower your cholesterol?   Yes- lipitor 80mg  Are you having muscle aches or other side effects that you think could be caused by your cholesterol lowering medication?  Yes- they always ache    Lab Results   Component Value Date    A1C 5.9 11/18/2024    A1C 5.8 05/13/2024    A1C 5.8 11/09/2023    A1C 5.8 10/27/2022    A1C 6.0 10/25/2021    A1C 5.9 10/29/2020    A1C 6.0 10/25/2019       Hypertension Follow-up  Do you check your blood pressure regularly outside of the clinic? Yes sometimes   Are you following a low salt diet? No  Are your blood pressures ever more than 140 on the top number (systolic) OR more   than 90 on the bottom number (diastolic), for example 140/90? No    BP Readings from Last 2 Encounters:   05/22/25 120/68   02/20/25 130/70     Depression and Anxiety   How are you doing with your depression since your last visit? No change  How are you doing with your anxiety since your last visit?  No change  Are you having other symptoms that might be associated with depression or anxiety? No  Have you had a significant life event? No   Do you have any concerns with your use of alcohol or other drugs? No    Social History     Tobacco Use    Smoking status: Former     Current packs/day: 0.00      Types: Cigarettes     Quit date: 1992     Years since quittin.4     Passive exposure: Never    Smokeless tobacco: Never   Vaping Use    Vaping status: Never Used   Substance Use Topics    Alcohol use: Never    Drug use: No         2023     8:49 AM 2024     9:42 AM 2025     9:38 AM   PHQ   PHQ-9 Total Score 4 0 0   Q9: Thoughts of better off dead/self-harm past 2 weeks Not at all Not at all Not at all         2023     8:51 AM 2024     9:42 AM 2025     9:38 AM   ROBYN-7 SCORE   Total Score 0 (minimal anxiety)     Total Score 0 0 0         2025     9:38 AM   Last PHQ-9   1.  Little interest or pleasure in doing things 0   2.  Feeling down, depressed, or hopeless 0   3.  Trouble falling or staying asleep, or sleeping too much 0   4.  Feeling tired or having little energy 0   5.  Poor appetite or overeating 0   6.  Feeling bad about yourself 0   7.  Trouble concentrating 0   8.  Moving slowly or restless 0   Q9: Thoughts of better off dead/self-harm past 2 weeks 0   PHQ-9 Total Score 0         2025     9:38 AM   ROBYN-7    1. Feeling nervous, anxious, or on edge 0   2. Not being able to stop or control worrying 0   3. Worrying too much about different things 0   4. Trouble relaxing 0   5. Being so restless that it is hard to sit still 0   6. Becoming easily annoyed or irritable 0   7. Feeling afraid, as if something awful might happen 0   ROBYN-7 Total Score 0       Suicide Assessment Five-step Evaluation and Treatment (SAFE-T)        Review of Systems  Constitutional, HEENT, cardiovascular, pulmonary, gi and gu systems are negative, except as otherwise noted.      Objective    /68 (BP Location: Right arm, Patient Position: Sitting, Cuff Size: Adult Regular)   Pulse 70   Temp 98.2  F (36.8  C) (Tympanic)   Resp 20   Ht 1.829 m (6')   Wt 109.8 kg (242 lb)   SpO2 98%   BMI 32.82 kg/m    Body mass index is 32.82 kg/m .  Physical Exam   GENERAL: alert and no  distress  NECK: no adenopathy, no asymmetry, masses, or scars  RESP: lungs clear to auscultation - no rales, rhonchi or wheezes  CV: regular rate and rhythm, normal S1 S2, no S3 or S4, no murmur, click or rub, no peripheral edema  MS: normal muscle tone and able to toe/heel raise; bend to 90 degrees at waist; march in place; leg strength symmetric 4/5 throughout  SKIN: groin creases with erythema; dry; no ulcerations  PSYCH: mentation appears normal, affect normal/bright    Labs pending = cbc bmp         Signed Electronically by: Evelyn Iniguez MD

## 2025-05-22 ENCOUNTER — OFFICE VISIT (OUTPATIENT)
Dept: FAMILY MEDICINE | Facility: OTHER | Age: 85
End: 2025-05-22
Attending: FAMILY MEDICINE
Payer: COMMERCIAL

## 2025-05-22 ENCOUNTER — RESULTS FOLLOW-UP (OUTPATIENT)
Dept: FAMILY MEDICINE | Facility: OTHER | Age: 85
End: 2025-05-22

## 2025-05-22 VITALS
HEIGHT: 72 IN | RESPIRATION RATE: 20 BRPM | TEMPERATURE: 98.2 F | DIASTOLIC BLOOD PRESSURE: 68 MMHG | HEART RATE: 70 BPM | SYSTOLIC BLOOD PRESSURE: 120 MMHG | BODY MASS INDEX: 32.78 KG/M2 | OXYGEN SATURATION: 98 % | WEIGHT: 242 LBS

## 2025-05-22 DIAGNOSIS — R73.01 ELEVATED FASTING GLUCOSE: ICD-10-CM

## 2025-05-22 DIAGNOSIS — E78.5 HYPERLIPIDEMIA, UNSPECIFIED HYPERLIPIDEMIA TYPE: ICD-10-CM

## 2025-05-22 DIAGNOSIS — M51.360 DEGENERATION OF INTERVERTEBRAL DISC OF LUMBAR REGION WITH DISCOGENIC BACK PAIN: ICD-10-CM

## 2025-05-22 DIAGNOSIS — Z91.81 AT HIGH RISK FOR FALLS: ICD-10-CM

## 2025-05-22 DIAGNOSIS — B37.2 YEAST INFECTION OF THE SKIN: ICD-10-CM

## 2025-05-22 DIAGNOSIS — I10 ESSENTIAL HYPERTENSION: ICD-10-CM

## 2025-05-22 DIAGNOSIS — G89.4 CHRONIC PAIN SYNDROME: Primary | ICD-10-CM

## 2025-05-22 DIAGNOSIS — R26.9 IMPAIRED GAIT: ICD-10-CM

## 2025-05-22 DIAGNOSIS — M54.16 LUMBAR RADICULOPATHY: ICD-10-CM

## 2025-05-22 DIAGNOSIS — N18.31 STAGE 3A CHRONIC KIDNEY DISEASE (H): ICD-10-CM

## 2025-05-22 LAB
ALBUMIN SERPL BCG-MCNC: 4.2 G/DL (ref 3.5–5.2)
ANION GAP SERPL CALCULATED.3IONS-SCNC: 11 MMOL/L (ref 7–15)
BASOPHILS # BLD AUTO: 0.1 10E3/UL (ref 0–0.2)
BASOPHILS NFR BLD AUTO: 1 %
BUN SERPL-MCNC: 22.7 MG/DL (ref 8–23)
CALCIUM SERPL-MCNC: 9.9 MG/DL (ref 8.8–10.4)
CHLORIDE SERPL-SCNC: 103 MMOL/L (ref 98–107)
CREAT SERPL-MCNC: 1.34 MG/DL (ref 0.67–1.17)
EGFRCR SERPLBLD CKD-EPI 2021: 53 ML/MIN/1.73M2
EOSINOPHIL # BLD AUTO: 0.2 10E3/UL (ref 0–0.7)
EOSINOPHIL NFR BLD AUTO: 3 %
ERYTHROCYTE [DISTWIDTH] IN BLOOD BY AUTOMATED COUNT: 13.3 % (ref 10–15)
GLUCOSE SERPL-MCNC: 103 MG/DL (ref 70–99)
HCO3 SERPL-SCNC: 26 MMOL/L (ref 22–29)
HCT VFR BLD AUTO: 45.1 % (ref 40–53)
HGB BLD-MCNC: 15 G/DL (ref 13.3–17.7)
IMM GRANULOCYTES # BLD: 0 10E3/UL
IMM GRANULOCYTES NFR BLD: 0 %
LYMPHOCYTES # BLD AUTO: 1.8 10E3/UL (ref 0.8–5.3)
LYMPHOCYTES NFR BLD AUTO: 30 %
MCH RBC QN AUTO: 29.4 PG (ref 26.5–33)
MCHC RBC AUTO-ENTMCNC: 33.3 G/DL (ref 31.5–36.5)
MCV RBC AUTO: 88 FL (ref 78–100)
MONOCYTES # BLD AUTO: 0.7 10E3/UL (ref 0–1.3)
MONOCYTES NFR BLD AUTO: 12 %
NEUTROPHILS # BLD AUTO: 3.4 10E3/UL (ref 1.6–8.3)
NEUTROPHILS NFR BLD AUTO: 55 %
NRBC # BLD AUTO: 0 10E3/UL
NRBC BLD AUTO-RTO: 0 /100
PHOSPHATE SERPL-MCNC: 2.5 MG/DL (ref 2.5–4.5)
PLATELET # BLD AUTO: 221 10E3/UL (ref 150–450)
POTASSIUM SERPL-SCNC: 4.4 MMOL/L (ref 3.4–5.3)
RBC # BLD AUTO: 5.11 10E6/UL (ref 4.4–5.9)
SODIUM SERPL-SCNC: 140 MMOL/L (ref 135–145)
WBC # BLD AUTO: 6.2 10E3/UL (ref 4–11)

## 2025-05-22 PROCEDURE — 36415 COLL VENOUS BLD VENIPUNCTURE: CPT | Mod: ZL | Performed by: FAMILY MEDICINE

## 2025-05-22 PROCEDURE — 90480 ADMN SARSCOV2 VAC 1/ONLY CMP: CPT

## 2025-05-22 PROCEDURE — 80069 RENAL FUNCTION PANEL: CPT | Mod: ZL | Performed by: FAMILY MEDICINE

## 2025-05-22 PROCEDURE — 85004 AUTOMATED DIFF WBC COUNT: CPT | Mod: ZL | Performed by: FAMILY MEDICINE

## 2025-05-22 PROCEDURE — G0463 HOSPITAL OUTPT CLINIC VISIT: HCPCS

## 2025-05-22 RX ORDER — PHENOL 1.4 %
10 AEROSOL, SPRAY (ML) MUCOUS MEMBRANE
COMMUNITY

## 2025-05-22 RX ORDER — NYSTATIN 100000 U/G
CREAM TOPICAL 2 TIMES DAILY
Qty: 30 G | Refills: 3 | Status: SHIPPED | OUTPATIENT
Start: 2025-05-22

## 2025-05-22 ASSESSMENT — PATIENT HEALTH QUESTIONNAIRE - PHQ9
5. POOR APPETITE OR OVEREATING: NOT AT ALL
SUM OF ALL RESPONSES TO PHQ QUESTIONS 1-9: 0

## 2025-05-22 ASSESSMENT — ANXIETY QUESTIONNAIRES
3. WORRYING TOO MUCH ABOUT DIFFERENT THINGS: NOT AT ALL
5. BEING SO RESTLESS THAT IT IS HARD TO SIT STILL: NOT AT ALL
6. BECOMING EASILY ANNOYED OR IRRITABLE: NOT AT ALL
7. FEELING AFRAID AS IF SOMETHING AWFUL MIGHT HAPPEN: NOT AT ALL
1. FEELING NERVOUS, ANXIOUS, OR ON EDGE: NOT AT ALL
GAD7 TOTAL SCORE: 0
2. NOT BEING ABLE TO STOP OR CONTROL WORRYING: NOT AT ALL
GAD7 TOTAL SCORE: 0

## 2025-05-22 ASSESSMENT — PAIN SCALES - GENERAL: PAINLEVEL_OUTOF10: MODERATE PAIN (4)

## 2025-05-22 NOTE — PATIENT INSTRUCTIONS
4 wheel walker with seat order faxed to [facility].    Nystatin topical antifungal cream for groin twice daily.    Handicap parking permit form completed.    Covid booster given.    Labs today - will notify of results    Follow up 3 months.

## 2025-06-09 DIAGNOSIS — G89.4 CHRONIC PAIN SYNDROME: ICD-10-CM

## 2025-06-09 DIAGNOSIS — M51.360 DEGENERATION OF INTERVERTEBRAL DISC OF LUMBAR REGION WITH DISCOGENIC BACK PAIN: ICD-10-CM

## 2025-06-09 RX ORDER — OXYCODONE HYDROCHLORIDE 5 MG/1
5 TABLET ORAL 2 TIMES DAILY PRN
Qty: 60 TABLET | Refills: 0 | Status: SHIPPED | OUTPATIENT
Start: 2025-06-09

## 2025-06-09 NOTE — TELEPHONE ENCOUNTER
Oxycodone      Last Written Prescription Date:  5.6.25  Last Fill Quantity: #60,   # refills: 0  Last Office Visit: 5.22.25  Future Office visit:    Next 5 appointments (look out 90 days)      Aug 21, 2025 10:15 AM  (Arrive by 10:00 AM)  Adult Preventative Visit with Ev[facility] Olson MD  [facility] [address] [facility] [address]  [phone]     Aug 22, 2025 9:15 AM  (Arrive by 9:00 AM)  Provider Visit with Evelyn Olson MD  [facility] ([facility] [place] ) [address] [facility] [address]  [phone]             Routing refill request to provider for review/approval because:  Drug not on the [facility], [facility] or [facility] refill protocol or controlled substance

## 2025-07-03 ENCOUNTER — TRANSFERRED RECORDS (OUTPATIENT)
Dept: HEALTH INFORMATION MANAGEMENT | Facility: CLINIC | Age: 85
End: 2025-07-03

## 2025-07-08 DIAGNOSIS — I10 ESSENTIAL HYPERTENSION: ICD-10-CM

## 2025-07-08 RX ORDER — LOSARTAN POTASSIUM 50 MG/1
50 TABLET ORAL DAILY
Qty: 90 TABLET | Refills: 0 | Status: SHIPPED | OUTPATIENT
Start: 2025-07-08

## 2025-07-08 NOTE — TELEPHONE ENCOUNTER
losartan (COZAAR) 50 MG tablet         Last Written Prescription Date:  4/9/25  Last Fill Quantity: 90,   # refills: 0  Last Office Visit: 5/22/25  Future Office visit:    Next 5 appointments (look out 90 days)      Aug 21, 2025 10:15 AM  (Arrive by 10:00 AM)  Adult Preventative Visit with Evelyn Olson MD  [facility] ([facility] [place] ) [address] [facility] [address]  [phone]     Aug 22, 2025 9:15 AM  (Arrive by 9:00 AM)  Provider Visit with Evelyn Olson MD  [facility] ([facility] [place] ) [address] [facility] [address]  [phone]             Routing refill request to provider for review/approval because:  Angiotensin-II Receptors Failed      Has GFR on file in past 12 months and most recent value is normal

## 2025-07-10 DIAGNOSIS — G89.4 CHRONIC PAIN SYNDROME: ICD-10-CM

## 2025-07-10 DIAGNOSIS — M51.360 DEGENERATION OF INTERVERTEBRAL DISC OF LUMBAR REGION WITH DISCOGENIC BACK PAIN: ICD-10-CM

## 2025-07-10 RX ORDER — OXYCODONE HYDROCHLORIDE 5 MG/1
5 TABLET ORAL 2 TIMES DAILY PRN
Qty: 60 TABLET | Refills: 0 | Status: SHIPPED | OUTPATIENT
Start: 2025-07-10

## 2025-07-10 NOTE — TELEPHONE ENCOUNTER
Oxycodone      Last Written Prescription Date:  6.9.25  Last Fill Quantity: #60,   # refills: 0  Last Office Visit: 5.22.25  Future Office visit:    Next 5 appointments (look out 90 days)      Aug 21, 2025 10:15 AM  (Arrive by 10:00 AM)  Adult Preventative Visit with Evelyn Olson MD  [facility] [address] [facility] [address]  [phone]     Aug 22, 2025 9:15 AM  (Arrive by 9:00 AM)  Provider Visit with Evelyn Olson MD  [facility] ([facility] ) [address] [facility] [address]  [phone]             Routing refill request to provider for review/approval because:  Drug not on the [facility], [facility] or [facility] refill protocol or controlled substance

## 2025-07-29 DIAGNOSIS — E78.5 HYPERLIPIDEMIA, UNSPECIFIED HYPERLIPIDEMIA TYPE: ICD-10-CM

## 2025-07-29 RX ORDER — ATORVASTATIN CALCIUM 80 MG/1
80 TABLET, FILM COATED ORAL DAILY
Qty: 90 TABLET | Refills: 2 | Status: SHIPPED | OUTPATIENT
Start: 2025-07-29

## 2025-08-12 DIAGNOSIS — M51.360 DEGENERATION OF INTERVERTEBRAL DISC OF LUMBAR REGION WITH DISCOGENIC BACK PAIN: ICD-10-CM

## 2025-08-12 DIAGNOSIS — G89.4 CHRONIC PAIN SYNDROME: ICD-10-CM

## 2025-08-12 RX ORDER — OXYCODONE HYDROCHLORIDE 5 MG/1
5 TABLET ORAL 2 TIMES DAILY PRN
Qty: 60 TABLET | Refills: 0 | Status: SHIPPED | OUTPATIENT
Start: 2025-08-12

## 2025-08-21 ENCOUNTER — OFFICE VISIT (OUTPATIENT)
Dept: FAMILY MEDICINE | Facility: OTHER | Age: 85
End: 2025-08-21
Attending: FAMILY MEDICINE
Payer: COMMERCIAL

## 2025-08-21 VITALS
RESPIRATION RATE: 16 BRPM | HEIGHT: 72 IN | WEIGHT: 244.8 LBS | HEART RATE: 78 BPM | SYSTOLIC BLOOD PRESSURE: 132 MMHG | BODY MASS INDEX: 33.16 KG/M2 | TEMPERATURE: 98.2 F | OXYGEN SATURATION: 96 % | DIASTOLIC BLOOD PRESSURE: 68 MMHG

## 2025-08-21 DIAGNOSIS — G89.4 CHRONIC PAIN SYNDROME: ICD-10-CM

## 2025-08-21 DIAGNOSIS — H35.3231 EXUDATIVE AGE-RELATED MACULAR DEGENERATION, BILATERAL, WITH ACTIVE CHOROIDAL NEOVASCULARIZATION (H): ICD-10-CM

## 2025-08-21 DIAGNOSIS — N18.31 STAGE 3A CHRONIC KIDNEY DISEASE (H): ICD-10-CM

## 2025-08-21 DIAGNOSIS — R73.01 ELEVATED FASTING GLUCOSE: ICD-10-CM

## 2025-08-21 DIAGNOSIS — Z00.00 ENCOUNTER FOR MEDICARE ANNUAL WELLNESS EXAM: Primary | ICD-10-CM

## 2025-08-21 DIAGNOSIS — Z71.1 CONCERN ABOUT SKIN CANCER WITHOUT DIAGNOSIS: ICD-10-CM

## 2025-08-21 DIAGNOSIS — R60.0 EDEMA, LOWER EXTREMITY: ICD-10-CM

## 2025-08-21 DIAGNOSIS — I10 ESSENTIAL HYPERTENSION: ICD-10-CM

## 2025-08-21 DIAGNOSIS — E78.5 HYPERLIPIDEMIA, UNSPECIFIED HYPERLIPIDEMIA TYPE: ICD-10-CM

## 2025-08-21 DIAGNOSIS — M51.360 DEGENERATION OF INTERVERTEBRAL DISC OF LUMBAR REGION WITH DISCOGENIC BACK PAIN: ICD-10-CM

## 2025-08-21 PROBLEM — E66.01 MORBID OBESITY (H): Status: RESOLVED | Noted: 2019-10-25 | Resolved: 2025-08-21

## 2025-08-21 PROCEDURE — G0463 HOSPITAL OUTPT CLINIC VISIT: HCPCS

## 2025-08-21 RX ORDER — LOSARTAN POTASSIUM 50 MG/1
50 TABLET ORAL DAILY
Qty: 90 TABLET | Refills: 0 | Status: SHIPPED | OUTPATIENT
Start: 2025-08-21

## 2025-08-21 RX ORDER — AMLODIPINE BESYLATE 10 MG/1
10 TABLET ORAL DAILY
Qty: 90 TABLET | Refills: 1 | Status: SHIPPED | OUTPATIENT
Start: 2025-08-21

## 2025-08-21 RX ORDER — FUROSEMIDE 20 MG/1
20 TABLET ORAL DAILY
Qty: 90 TABLET | Refills: 1 | Status: SHIPPED | OUTPATIENT
Start: 2025-08-21

## 2025-08-21 SDOH — HEALTH STABILITY: PHYSICAL HEALTH: ON AVERAGE, HOW MANY DAYS PER WEEK DO YOU ENGAGE IN MODERATE TO STRENUOUS EXERCISE (LIKE A BRISK WALK)?: 0 DAYS

## 2025-08-21 SDOH — HEALTH STABILITY: PHYSICAL HEALTH: ON AVERAGE, HOW MANY MINUTES DO YOU ENGAGE IN EXERCISE AT THIS LEVEL?: 0 MIN

## 2025-08-21 ASSESSMENT — PAIN SCALES - PAIN ENJOYMENT GENERAL ACTIVITY SCALE (PEG)
PEG_TOTALSCORE: 5.67
INTERFERED_GENERAL_ACTIVITY: 6
AVG_PAIN_PASTWEEK: 5
INTERFERED_ENJOYMENT_LIFE: 6
INTERFERED_GENERAL_ACTIVITY: 6
INTERFERED_ENJOYMENT_LIFE: 6
PEG_TOTALSCORE: 5.67
AVG_PAIN_PASTWEEK: 5

## 2025-08-21 ASSESSMENT — PAIN SCALES - GENERAL: PAINLEVEL_OUTOF10: MODERATE PAIN (5)

## 2025-08-21 ASSESSMENT — SOCIAL DETERMINANTS OF HEALTH (SDOH): HOW OFTEN DO YOU GET TOGETHER WITH FRIENDS OR RELATIVES?: TWICE A WEEK
